# Patient Record
Sex: MALE | Race: WHITE | Employment: OTHER | URBAN - METROPOLITAN AREA
[De-identification: names, ages, dates, MRNs, and addresses within clinical notes are randomized per-mention and may not be internally consistent; named-entity substitution may affect disease eponyms.]

---

## 2017-01-30 LAB
AVERAGE GLUCOSE: NORMAL
HBA1C MFR BLD: 5.6 %

## 2017-03-07 ENCOUNTER — TELEPHONE (OUTPATIENT)
Dept: PULMONOLOGY | Age: 67
End: 2017-03-07

## 2017-04-04 ENCOUNTER — HOSPITAL ENCOUNTER (OUTPATIENT)
Dept: OTHER | Age: 67
Discharge: HOME | End: 2017-04-04
Attending: FAMILY MEDICINE | Admitting: FAMILY MEDICINE

## 2017-04-04 LAB
A/G RATIO: 1.7 (ref 1.1–2.2)
ALBUMIN SERPL-MCNC: 4.5 G/DL (ref 3.4–5)
ALP BLD-CCNC: 75 U/L (ref 40–129)
ALT SERPL-CCNC: 18 U/L (ref 10–40)
ANION GAP SERPL CALCULATED.3IONS-SCNC: 10 MMOL/L (ref 3–16)
AST SERPL-CCNC: 18 U/L (ref 15–37)
BILIRUB SERPL-MCNC: 0.6 MG/DL (ref 0–1)
BUN BLDV-MCNC: 19 MG/DL (ref 7–20)
CALCIUM SERPL-MCNC: 9.7 MG/DL (ref 8.3–10.6)
CHLORIDE BLD-SCNC: 98 MMOL/L (ref 99–110)
CO2: 32 MMOL/L (ref 21–32)
CREAT SERPL-MCNC: 1 MG/DL (ref 0.8–1.3)
GFR AFRICAN AMERICAN: >60
GFR NON-AFRICAN AMERICAN: >60
GLOBULIN: 2.6 G/DL
GLUCOSE BLD-MCNC: 86 MG/DL (ref 70–99)
HCT VFR BLD CALC: 38.6 % (ref 40.5–52.5)
HEMOGLOBIN: 12.7 G/DL (ref 13.5–17.5)
MCH RBC QN AUTO: 29.3 PG (ref 26–34)
MCHC RBC AUTO-ENTMCNC: 32.9 G/DL (ref 31–36)
MCV RBC AUTO: 89.1 FL (ref 80–100)
PDW BLD-RTO: 14.4 % (ref 12.4–15.4)
PLATELET # BLD: 219 K/UL (ref 135–450)
PMV BLD AUTO: 9.4 FL (ref 5–10.5)
POTASSIUM SERPL-SCNC: 4.4 MMOL/L (ref 3.5–5.1)
RBC # BLD: 4.34 M/UL (ref 4.2–5.9)
SEDIMENTATION RATE, ERYTHROCYTE: 43 MM/HR (ref 0–20)
SODIUM BLD-SCNC: 140 MMOL/L (ref 136–145)
TOTAL PROTEIN: 7.1 G/DL (ref 6.4–8.2)
URIC ACID, SERUM: 8.5 MG/DL (ref 3.5–7.2)
WBC # BLD: 8.4 K/UL (ref 4–11)

## 2017-05-20 ENCOUNTER — EMPLOYEE WELLNESS (OUTPATIENT)
Dept: OTHER | Age: 67
End: 2017-05-20

## 2017-05-20 LAB
CHOLESTEROL, TOTAL: 139 MG/DL (ref 0–199)
GLUCOSE BLD-MCNC: 80 MG/DL (ref 70–99)
HDLC SERPL-MCNC: 52 MG/DL (ref 40–60)
LDL CHOLESTEROL CALCULATED: 66 MG/DL
TRIGL SERPL-MCNC: 106 MG/DL (ref 0–150)

## 2017-07-05 ENCOUNTER — TELEPHONE (OUTPATIENT)
Dept: PULMONOLOGY | Age: 67
End: 2017-07-05

## 2017-07-05 ENCOUNTER — OFFICE VISIT (OUTPATIENT)
Dept: PULMONOLOGY | Age: 67
End: 2017-07-05

## 2017-07-05 VITALS
SYSTOLIC BLOOD PRESSURE: 100 MMHG | RESPIRATION RATE: 18 BRPM | HEART RATE: 65 BPM | WEIGHT: 204 LBS | OXYGEN SATURATION: 98 % | BODY MASS INDEX: 28.56 KG/M2 | TEMPERATURE: 97.5 F | HEIGHT: 71 IN | DIASTOLIC BLOOD PRESSURE: 65 MMHG

## 2017-07-05 DIAGNOSIS — G47.33 OSA TREATED WITH BIPAP: Primary | ICD-10-CM

## 2017-07-05 PROCEDURE — 99213 OFFICE O/P EST LOW 20 MIN: CPT | Performed by: NURSE PRACTITIONER

## 2017-07-05 RX ORDER — TAMSULOSIN HYDROCHLORIDE 0.4 MG/1
0.4 CAPSULE ORAL
Status: ON HOLD | COMMUNITY
Start: 2017-01-30 | End: 2021-01-01 | Stop reason: HOSPADM

## 2017-07-05 ASSESSMENT — SLEEP AND FATIGUE QUESTIONNAIRES
HOW LIKELY ARE YOU TO NOD OFF OR FALL ASLEEP WHILE SITTING QUIETLY AFTER LUNCH WITHOUT ALCOHOL: 0
ESS TOTAL SCORE: 0
HOW LIKELY ARE YOU TO NOD OFF OR FALL ASLEEP WHILE SITTING INACTIVE IN A PUBLIC PLACE: 0
NECK CIRCUMFERENCE (INCHES): 15
HOW LIKELY ARE YOU TO NOD OFF OR FALL ASLEEP WHILE SITTING AND READING: 0
HOW LIKELY ARE YOU TO NOD OFF OR FALL ASLEEP WHEN YOU ARE A PASSENGER IN A CAR FOR AN HOUR WITHOUT A BREAK: 0
HOW LIKELY ARE YOU TO NOD OFF OR FALL ASLEEP WHILE SITTING AND TALKING TO SOMEONE: 0
HOW LIKELY ARE YOU TO NOD OFF OR FALL ASLEEP WHILE LYING DOWN TO REST IN THE AFTERNOON WHEN CIRCUMSTANCES PERMIT: 0
HOW LIKELY ARE YOU TO NOD OFF OR FALL ASLEEP IN A CAR, WHILE STOPPED FOR A FEW MINUTES IN TRAFFIC: 0
HOW LIKELY ARE YOU TO NOD OFF OR FALL ASLEEP WHILE WATCHING TV: 0

## 2017-08-01 ENCOUNTER — HOSPITAL ENCOUNTER (OUTPATIENT)
Dept: OTHER | Age: 67
Discharge: HOME | End: 2017-08-31
Attending: FAMILY MEDICINE | Admitting: FAMILY MEDICINE

## 2017-08-28 LAB
INR BLD: 2.48 (ref 0.85–1.15)
PROTHROMBIN TIME: 28 SEC (ref 9.6–13)

## 2017-10-20 ENCOUNTER — HOSPITAL ENCOUNTER (OUTPATIENT)
Dept: OTHER | Age: 67
Discharge: HOME | End: 2017-10-31
Attending: FAMILY MEDICINE | Admitting: FAMILY MEDICINE

## 2017-10-20 LAB
INR BLD: 2.04 (ref 0.85–1.15)
PROTHROMBIN TIME: 23.1 SEC (ref 9.6–13)

## 2017-11-01 ENCOUNTER — HOSPITAL ENCOUNTER (OUTPATIENT)
Dept: OTHER | Age: 67
Discharge: HOME | End: 2017-11-30
Attending: FAMILY MEDICINE | Admitting: FAMILY MEDICINE

## 2017-12-20 ENCOUNTER — HOSPITAL ENCOUNTER (OUTPATIENT)
Dept: OTHER | Age: 67
Discharge: HOME | End: 2017-12-31
Attending: FAMILY MEDICINE | Admitting: FAMILY MEDICINE

## 2017-12-20 LAB
INR BLD: 1.97 (ref 0.85–1.15)
PROTHROMBIN TIME: 22.3 SEC (ref 9.6–13)

## 2018-01-01 ENCOUNTER — HOSPITAL ENCOUNTER (OUTPATIENT)
Dept: OTHER | Age: 68
Discharge: HOME | End: 2018-01-31
Attending: FAMILY MEDICINE | Admitting: FAMILY MEDICINE

## 2018-02-01 ENCOUNTER — HOSPITAL ENCOUNTER (OUTPATIENT)
Dept: OTHER | Age: 68
Discharge: HOME | End: 2018-02-28
Attending: FAMILY MEDICINE | Admitting: FAMILY MEDICINE

## 2018-02-12 LAB
INR BLD: 1.91 (ref 0.85–1.15)
PROTHROMBIN TIME: 21.6 SEC (ref 9.6–13)

## 2018-03-01 ENCOUNTER — HOSPITAL ENCOUNTER (OUTPATIENT)
Dept: OTHER | Age: 68
Discharge: HOME | End: 2018-03-01
Attending: FAMILY MEDICINE | Admitting: FAMILY MEDICINE

## 2018-03-01 ENCOUNTER — HOSPITAL ENCOUNTER (OUTPATIENT)
Dept: OTHER | Age: 68
Discharge: HOME | End: 2018-03-31
Attending: FAMILY MEDICINE | Admitting: FAMILY MEDICINE

## 2018-03-01 LAB
A/G RATIO: 2 (ref 1.1–2.2)
ALBUMIN SERPL-MCNC: 5.1 G/DL (ref 3.4–5)
ALP BLD-CCNC: 70 U/L (ref 40–129)
ALT SERPL-CCNC: 31 U/L (ref 10–40)
ANION GAP SERPL CALCULATED.3IONS-SCNC: 15 MMOL/L (ref 3–16)
AST SERPL-CCNC: 26 U/L (ref 15–37)
BILIRUB SERPL-MCNC: 0.6 MG/DL (ref 0–1)
BUN BLDV-MCNC: 20 MG/DL (ref 7–20)
CALCIUM SERPL-MCNC: 10 MG/DL (ref 8.3–10.6)
CHLORIDE BLD-SCNC: 98 MMOL/L (ref 99–110)
CHOLESTEROL, TOTAL: 162 MG/DL (ref 0–199)
CO2: 29 MMOL/L (ref 21–32)
CREAT SERPL-MCNC: 1.1 MG/DL (ref 0.8–1.3)
GFR AFRICAN AMERICAN: >60
GFR NON-AFRICAN AMERICAN: >60
GLOBULIN: 2.6 G/DL
GLUCOSE BLD-MCNC: 95 MG/DL (ref 70–99)
HCT VFR BLD CALC: 40.7 % (ref 40.5–52.5)
HDLC SERPL-MCNC: 77 MG/DL (ref 40–60)
HEMOGLOBIN: 13.7 G/DL (ref 13.5–17.5)
INR BLD: 1.75 (ref 0.85–1.15)
LDL CHOLESTEROL CALCULATED: 65 MG/DL
MCH RBC QN AUTO: 30.6 PG (ref 26–34)
MCHC RBC AUTO-ENTMCNC: 33.7 G/DL (ref 31–36)
MCV RBC AUTO: 90.9 FL (ref 80–100)
PDW BLD-RTO: 14.2 % (ref 12.4–15.4)
PLATELET # BLD: 234 K/UL (ref 135–450)
PMV BLD AUTO: 10.5 FL (ref 5–10.5)
POTASSIUM SERPL-SCNC: 4.7 MMOL/L (ref 3.5–5.1)
PROTHROMBIN TIME: 19.8 SEC (ref 9.6–13)
RBC # BLD: 4.48 M/UL (ref 4.2–5.9)
SODIUM BLD-SCNC: 142 MMOL/L (ref 136–145)
TOTAL PROTEIN: 7.7 G/DL (ref 6.4–8.2)
TRIGL SERPL-MCNC: 102 MG/DL (ref 0–150)
VLDLC SERPL CALC-MCNC: 20 MG/DL
WBC # BLD: 6.3 K/UL (ref 4–11)

## 2018-03-01 NOTE — PRE-PROCEDURE INSTRUCTIONS
1.  Do not eat or drink anything after 12 midnight prior to surgery. This includes no water, chewing gum or mints. 2.  Take the following pills with a small sip of water on the morning of surgery . 3. Aspirin, Ibuprofen, Advil, Naproxen, Vitamin E and other Anti-inflammatory products should be stopped for 5 days before surgery or as directed by your physician. 4.  Check with your doctor regarding stopping Plavix, Coumadin, Lovenox, Fragmin or other blood thinners. 5.  Do not smoke and do not drink alcoholic beverages 24 hours prior to surgery. This includes NA Beer. 6.  You may brush your teeth and gargle the morning of surgery. DO NOT SWALLOW WATER.  7.  You MUST make arrangements for a responsible adult to take you home after your surgery. You will not be allowed to leave alone or drive yourself home. It is strongly suggested someone stay with you the first 24 hours. Your surgery will be cancelled if you do not have a ride home. 8.  A parent/legal guardian must accompany a child scheduled for surgery and plan to stay at the hospital until the child is discharged. Please do not bring other children with you. 9.  Please wear simple, loose fitting clothing to the hospital.  [place] not bring valuables ( money, credit cards, checkbooks, etc.)  Do not wear any makeup (including no eye makeup) or nail polish on your fingers or toes. 10.  Do not wear any jewelry or piercing on the day of surgery. All body piercing jewelry must be removed. 11.  If you have dentures, they will be removed before going to the OR; we will provide you a container. If you wear contact lenses or glasses, they will be removed; please bring a case for them. 12.  Please see your family doctor/pediatrician for a history & physical and/or concerning medications. Bring any test results/reports from your physician's office the day of surgery. 15.  Remember to bring Blood Bank Bracelet to the hospital on the day of surgery.   14.  If you have a Living Will and Durable Power of  for Healthcare, please bring in a copy. 13.  Notify your Surgeon if you develop any illness between now and surgery time; cough, cold, fever, sore throat, nausea, vomiting, etc.  Please notify your surgeon if you experience dizziness, shortness of breath or blurred vision between now and the time of your surgery. 16.  DO NOT shave your operative site 96 hours (4 days) prior to surgery. For face and neck surgery, men may use an electric razor 48 hours (2 days) prior to surgery. 17. Shower the night before surgery and the morning of surgery with  an antibacterial soap   or  Chlorhexidine gluconate (for total joint replacement). To provide excellent care, visitors will be limited to two in a room at any given time. Please no children under the age of 15 in the surgical department.

## 2018-03-03 NOTE — H&P
The H&P was reviewed, the patient was examined, and no change has occurred in the patient's condition since the H&P was completed.     Edita Farias MD, PhD, FACS

## 2018-03-06 ENCOUNTER — HOSPITAL ENCOUNTER (OUTPATIENT)
Dept: SURGERY | Age: 68
Discharge: HOME | End: 2018-03-06
Attending: OPHTHALMOLOGY | Admitting: OPHTHALMOLOGY

## 2018-03-06 VITALS
SYSTOLIC BLOOD PRESSURE: 93 MMHG | DIASTOLIC BLOOD PRESSURE: 56 MMHG | TEMPERATURE: 98.8 F | WEIGHT: 177 LBS | HEIGHT: 69 IN | HEART RATE: 75 BPM | RESPIRATION RATE: 16 BRPM | BODY MASS INDEX: 26.22 KG/M2 | OXYGEN SATURATION: 100 %

## 2018-03-06 LAB
GLUCOSE BLD-MCNC: 69 MG/DL (ref 70–99)
GLUCOSE BLD-MCNC: 84 MG/DL (ref 70–99)
PERFORMED ON: ABNORMAL
PERFORMED ON: NORMAL

## 2018-03-06 RX ORDER — SODIUM CHLORIDE, SODIUM LACTATE, POTASSIUM CHLORIDE, CALCIUM CHLORIDE 600; 310; 30; 20 MG/100ML; MG/100ML; MG/100ML; MG/100ML
INJECTION, SOLUTION INTRAVENOUS
Status: DISPENSED
Start: 2018-03-06 | End: 2018-03-06

## 2018-03-06 RX ORDER — PREDNISOLONE ACETATE 10 MG/ML
1 SUSPENSION/ DROPS OPHTHALMIC SEE ADMIN INSTRUCTIONS
Status: DISCONTINUED | OUTPATIENT
Start: 2018-03-06 | End: 2018-03-07 | Stop reason: HOSPADM

## 2018-03-06 RX ORDER — SODIUM CHLORIDE, SODIUM LACTATE, POTASSIUM CHLORIDE, CALCIUM CHLORIDE 600; 310; 30; 20 MG/100ML; MG/100ML; MG/100ML; MG/100ML
INJECTION, SOLUTION INTRAVENOUS CONTINUOUS
Status: DISCONTINUED | OUTPATIENT
Start: 2018-03-06 | End: 2018-03-07 | Stop reason: HOSPADM

## 2018-03-06 RX ORDER — MOXIFLOXACIN 5 MG/ML
1 SOLUTION/ DROPS OPHTHALMIC SEE ADMIN INSTRUCTIONS
Status: DISCONTINUED | OUTPATIENT
Start: 2018-03-06 | End: 2018-03-07 | Stop reason: HOSPADM

## 2018-03-06 RX ADMIN — SODIUM CHLORIDE, SODIUM LACTATE, POTASSIUM CHLORIDE, CALCIUM CHLORIDE: 600; 310; 30; 20 INJECTION, SOLUTION INTRAVENOUS at 09:00

## 2018-03-06 ASSESSMENT — PAIN SCALES - GENERAL
PAINLEVEL_OUTOF10: 0
PAINLEVEL_OUTOF10: 0

## 2018-03-06 ASSESSMENT — ENCOUNTER SYMPTOMS: SHORTNESS OF BREATH: 1

## 2018-03-06 ASSESSMENT — PAIN - FUNCTIONAL ASSESSMENT: PAIN_FUNCTIONAL_ASSESSMENT: 0-10

## 2018-03-06 NOTE — OP NOTE
Enderana maria Ramesh    OPERATIVE NOTE    Preoperative Diagnosis: Cataract left eye    Postoperative Diagnosis: Cataract left eye    Procedure: Complex phacoemulsification with intraocular lens inplantation, left eye    Surgeon: Mera Graff M.D., Ph.D. Anesthesia: MAC with topical    Complications: none    Specimens: none    Indications for procedure: The patient is a 78y.o. year old with decreased vision, glare and halos around lights, and trouble with activities of daily living. Examination revealed a visually significant cataract in the left eye. The patient also showed evidence of poor dilation. Risks, benefits, and alternatives to surgery were discussed with the patient and the patient elected to proceed with phacoemulsification with lens implantation. Details of the procedure: Following informed consent, the patient was taken to the operating room and placed in the supine position. The eye was prepped and draped in the usual sterile fashion using aseptic technique for cataract surgery. Following topical tetracaine drops a side port incision was made in the inferotemporal cornea. The eye was filled with Trypan blue followed by balanced salt solution. The eye was filled with 1% non-preserved lidocaine. The eye was filled with viscoelastic and a 2.2 mm keratome blade was used to make a 3-plane clear corneal incision in the superotemporal cornea. A 7.0 mm Malyugin ring was inserted in the anterior chamber to aid with pupil dilation. The cystitome was used to make a tear in the anterior capsule and a Utrata forceps was used to make a complete curvilinear capsulorrhexis. The lens was hydrodissected and freely rotated. Phacoemulsification was performed. Irrigation/aspiration was used to remove all cortical material from the capsular bag. The eye was filled with viscoelastic and a foldable posterior chamber intraocular lens was injected into the capsular bag. The Malyugin ring was removed.

## 2018-03-06 NOTE — ANESTHESIA PRE-OP
Department of Anesthesiology  Preprocedure Note       Name:  Mary Segal   Age:  78 y.o.  :  1950                                          MRN:  [id number]         Date:  3/6/2018      Surgeon:    Procedure:    Medications prior to admission:   Prior to Admission medications    Medication Sig Start Date End Date Taking? Authorizing Provider   tamsulosin (FLOMAX) 0.4 MG capsule Take 0.4 mg by mouth 17  Yes Historical Provider, MD   furosemide (LASIX) 40 MG tablet Take 1 tablet by mouth 2 times daily 12/13/15  Yes Louis Spear MD   atorvastatin (LIPITOR) 20 MG tablet Take 20 mg by mouth nightly   Yes Historical Provider, MD   verapamil (CALAN-SR) 180 MG CR tablet Take 180 mg by mouth 2 times daily. Yes Historical Provider, MD   benazepril (LOTENSIN) 40 MG tablet Take 40 mg by mouth daily. Yes Historical Provider, MD   warfarin (COUMADIN) 10 MG tablet Take 10 mg by mouth daily. Yes Historical Provider, MD   docusate sodium (COLACE) 100 MG capsule Take 100 mg by mouth every other day    Yes Historical Provider, MD       Current medications:    Current Outpatient Prescriptions   Medication Sig Dispense Refill    tamsulosin (FLOMAX) 0.4 MG capsule Take 0.4 mg by mouth      furosemide (LASIX) 40 MG tablet Take 1 tablet by mouth 2 times daily 60 tablet 3    atorvastatin (LIPITOR) 20 MG tablet Take 20 mg by mouth nightly      verapamil (CALAN-SR) 180 MG CR tablet Take 180 mg by mouth 2 times daily.  benazepril (LOTENSIN) 40 MG tablet Take 40 mg by mouth daily.  warfarin (COUMADIN) 10 MG tablet Take 10 mg by mouth daily.       docusate sodium (COLACE) 100 MG capsule Take 100 mg by mouth every other day        Current Facility-Administered Medications   Medication Dose Route Frequency Provider Last Rate Last Dose    moxifloxacin (VIGAMOX) 0.5 % ophthalmic solution 1 drop  1 drop Left Eye See Admin Instructions Stefano Mera MD        prednisoLONE acetate (PRED FORTE) 1 % ophthalmic suspension 1 drop  1 drop Left Eye See Admin Instructions Mera Bowles MD        lactated ringers infusion   Intravenous Continuous Kaylen Paulson  mL/hr at 03/06/18 0900      lactated ringers infusion             epinephrine and lidocaine 2% PF in BSS injection (1 mL)   Intraocular Once Mera Bowles MD           Allergies:  No Known Allergies    Problem List:    Patient Active Problem List   Diagnosis Code    Atrial fibrillation ([facility] I48.91    Diabetes mellitus ([facility] E11.9    Hypertension I10    Hyperlipidemia E78.5    Urinary retention R33.9    Sleep apnea G47.30    Morbid obesity with BMI of 40.0-44.9, adult ([facility] E66.01, Z68.41    Prolonged pt (prothrombin time) R79.1    Snoring R06.83    Dyspnea R06.00    Chronic atrial fibrillation (HCC) I48.2    Essential hypertension I10    BELKYS treated with BiPAP G47.33    Nocturnal hypoxemia G47.34       Past Medical History:        Diagnosis Date    Atrial fibrillation ([facility]     CHF (congestive heart failure) ([facility]     Diabetes mellitus ([facility]     Hyperlipidemia     Hypertension     BELKYS treated with BiPAP        Past Surgical History:        Procedure Laterality Date    EYE SURGERY  12/1/15    right eye cataract    HERNIA REPAIR         Social History:    Social History   Substance Use Topics    Smoking status: Former Smoker     Quit date: 3/1/2005    Smokeless tobacco: Never Used    Alcohol use No                                Counseling given: Not Answered      Vital Signs (Current):   Vitals:    03/01/18 1053 03/06/18 0827   BP:  (!) 101/56   Pulse:  72   Resp:  20   Temp:  99 °F (37.2 °C)   TempSrc:  Temporal   SpO2:  100%   Weight: 177 lb (80.3 kg)    Height: 5' 9\" (1.753 m)                                               BP Readings from Last 3 Encounters:   03/06/18 (!) 101/56   07/05/17 100/65   03/09/16 99/66       NPO Status: Time of last liquid consumption: 0000                        Time of last solid

## 2018-03-20 VITALS — WEIGHT: 207 LBS

## 2018-04-01 ENCOUNTER — HOSPITAL ENCOUNTER (OUTPATIENT)
Dept: OTHER | Age: 68
Discharge: HOME | End: 2018-04-30
Attending: FAMILY MEDICINE | Admitting: FAMILY MEDICINE

## 2018-04-16 LAB
INR BLD: 1.86 (ref 0.85–1.15)
PROTHROMBIN TIME: 21 SEC (ref 9.6–13)

## 2018-05-01 ENCOUNTER — HOSPITAL ENCOUNTER (OUTPATIENT)
Dept: OTHER | Age: 68
Discharge: HOME | End: 2018-05-31
Attending: FAMILY MEDICINE | Admitting: FAMILY MEDICINE

## 2018-05-16 LAB
INR BLD: 1.87 (ref 0.85–1.15)
PROTHROMBIN TIME: 21.1 SEC (ref 9.6–13)

## 2018-05-19 ENCOUNTER — EMPLOYEE WELLNESS (OUTPATIENT)
Dept: OTHER | Age: 68
End: 2018-05-19

## 2018-05-29 VITALS — WEIGHT: 174 LBS

## 2018-06-01 ENCOUNTER — HOSPITAL ENCOUNTER (OUTPATIENT)
Dept: OTHER | Age: 68
Discharge: HOME | End: 2018-06-30
Attending: FAMILY MEDICINE | Admitting: FAMILY MEDICINE

## 2018-06-12 LAB
INR BLD: 4.03 (ref 0.86–1.14)
PROTHROMBIN TIME: 45.9 SEC (ref 9.8–13)

## 2018-07-01 ENCOUNTER — HOSPITAL ENCOUNTER (OUTPATIENT)
Dept: OTHER | Age: 68
Discharge: HOME | End: 2018-07-01
Attending: FAMILY MEDICINE | Admitting: FAMILY MEDICINE

## 2018-07-02 LAB
INR BLD: 3.18 (ref 0.86–1.14)
PROTHROMBIN TIME: 36.3 SEC (ref 9.8–13)

## 2018-07-11 ENCOUNTER — TELEPHONE (OUTPATIENT)
Dept: PULMONOLOGY | Age: 68
End: 2018-07-11

## 2018-07-11 ENCOUNTER — OFFICE VISIT (OUTPATIENT)
Dept: PULMONOLOGY | Age: 68
End: 2018-07-11

## 2018-07-11 VITALS
TEMPERATURE: 97.1 F | SYSTOLIC BLOOD PRESSURE: 101 MMHG | RESPIRATION RATE: 16 BRPM | OXYGEN SATURATION: 98 % | BODY MASS INDEX: 25.33 KG/M2 | HEIGHT: 69 IN | DIASTOLIC BLOOD PRESSURE: 54 MMHG | WEIGHT: 171 LBS | HEART RATE: 50 BPM

## 2018-07-11 DIAGNOSIS — Z71.89 ENCOUNTER FOR BIPAP USE COUNSELING: ICD-10-CM

## 2018-07-11 DIAGNOSIS — G47.33 OSA TREATED WITH BIPAP: Primary | ICD-10-CM

## 2018-07-11 PROCEDURE — 99213 OFFICE O/P EST LOW 20 MIN: CPT | Performed by: NURSE PRACTITIONER

## 2018-07-11 ASSESSMENT — SLEEP AND FATIGUE QUESTIONNAIRES
HOW LIKELY ARE YOU TO NOD OFF OR FALL ASLEEP WHILE LYING DOWN TO REST IN THE AFTERNOON WHEN CIRCUMSTANCES PERMIT: 0
HOW LIKELY ARE YOU TO NOD OFF OR FALL ASLEEP WHILE SITTING AND READING: 0
HOW LIKELY ARE YOU TO NOD OFF OR FALL ASLEEP IN A CAR, WHILE STOPPED FOR A FEW MINUTES IN TRAFFIC: 0
NECK CIRCUMFERENCE (INCHES): 15
HOW LIKELY ARE YOU TO NOD OFF OR FALL ASLEEP WHILE WATCHING TV: 0
HOW LIKELY ARE YOU TO NOD OFF OR FALL ASLEEP WHILE SITTING AND TALKING TO SOMEONE: 0
HOW LIKELY ARE YOU TO NOD OFF OR FALL ASLEEP WHEN YOU ARE A PASSENGER IN A CAR FOR AN HOUR WITHOUT A BREAK: 0
ESS TOTAL SCORE: 0
HOW LIKELY ARE YOU TO NOD OFF OR FALL ASLEEP WHILE SITTING QUIETLY AFTER LUNCH WITHOUT ALCOHOL: 0
HOW LIKELY ARE YOU TO NOD OFF OR FALL ASLEEP WHILE SITTING INACTIVE IN A PUBLIC PLACE: 0

## 2018-07-11 NOTE — PATIENT INSTRUCTIONS
school, daily life, etc., behind when you go to bed. Some people find it useful to assign a \"worry period\" during the evening or afternoon for these issues. CPAP Equipment Cleaning and Disinfecting Schedule  Equipment Cleaning Frequency Instructions  Disinfecting Frequency   Non-Disposable Filters  Weekly Mild soapy water, Rinse, Air Dry Not Required   Disposable Filters Change as needed  2-4 weeks Do Not Wash Not Required   Hose/tubing Daily Mild soapy water, Rinse, Air Dry Once a week   Mask / Nasal Pillows Daily Mild soapy water, Rinse, Air Dry Once a week   Headgear Weekly Hand wash, Mild soapy water, Rinse, Dry  Not Required   Humidifier Daily Empty water daily  Mild soapy water, Rinse well, Air Dry  Once a week   CPAP Unit As Needed Dust with damp cloth,  No detergents or sprays Not Required         Disinfect (per schedule) with 1 part white vinegar and 3 parts water- soak mask and water chamber for 30 minutes every 1-2 weeks, more often if sick. Allow water/vinegar mixture to run through tubing. Allow all equipment to air dry. Drying Hints:   Always hang tubing away from direct sunlight, as this will cause the tubing to become yellow, brittle and crack over a period of time. DO NOT attach the wet tubing to your CPAP unit to blow-dry it. The moisture from the tubing can drain back into your machine. Moisture in your unit can cause sudden pressure increases or short circuits  DO's and DON'Ts:  - Don't use alcohol-based products to clean your mask, because it can cause the materials to become hard and brittle. - Don't put headgear in the washer or dryer  - Don't use any caustic or household cleaning solutions such as bleach on your CPAP   equipment.  - Do follow the recommended cleaning schedule. - Do change your disposable filter frequently. Adapted From: [email or web address]/cleaning. shtm.   These are general suggestions for all models please follow specific s recommendations and specific instructions

## 2018-07-11 NOTE — PROGRESS NOTES
.Up to date with Influenza vaccine. Orders verbalized by Vickie Mendoza CNP;  Split night titration July 31 2018  F/U to discuss  Request compliance report from [facility].
(CALAN-SR) 180 MG CR tablet, Take 180 mg by mouth 2 times daily. , Disp: , Rfl:     benazepril (LOTENSIN) 40 MG tablet, Take 40 mg by mouth daily. , Disp: , Rfl:     warfarin (COUMADIN) 10 MG tablet, Take 10 mg by mouth daily. , Disp: , Rfl:     docusate sodium (COLACE) 100 MG capsule, Take 100 mg by mouth every other day , Disp: , Rfl:     Objective:   PHYSICAL EXAM:        VITALS:  BP (!) 101/54 (Site: Left Arm, Position: Sitting)   Pulse 50   Temp 97.1 °F (36.2 °C) (Oral)   Resp 16   Ht 5' 9\" (1.753 m)   Wt 171 lb (77.6 kg)   BMI 25.25 kg/m²     Gen: No acute distress. BMI of 28.45  Eyes: PERRL. No sclera icterus. No conjunctival injection. ENT: No discharge. Pharynx clear. Mallampati class IV. Neck: Trachea midline. No obvious mass. Neck circumference 15\"  Resp: No accessory muscle use. No crackles. No wheezes. No rhonchi. CV: Regular rate. IRRegular rhythm. No murmur or rub. Skin: Warm and dry. M/S: No cyanosis. No obvious joint deformity. Neuro: Awake. Alert. Moves all four extremities. Psych: Oriented x 3. No anxiety. Data:  Split night PSG completed on 12/15/16 reviewed by me and showed AHI 95.5 and desaturation to 56%, with 227 minutes below 88%. He has improved BELKYS on bipap therapy and required 3L bleed in. BIPAP compliance data:  Compliance download report from 6/5/17 to 7/4/17  showed patient is using machine 8:26 hrs/night with 100 % compliance and AHI 2.1 within this time frame. 30/30days with greater than 4 hours of machine use. 90% pressure 17.4/13.4 cm H20    ASSESSMENT:  · Severe BELKYS. AHI 95, BIPAP 17/13 cm H2O with 3 L O2 bleed in. States compliant  · Chronic diastolic CHF - sees Dr. Rojelio Prieto  · Atrial fibrillation on Coumadin    PLAN:  · Continue BIPAP 17/13 cm H2O. Will order new diagnostic sleep study to re-evaluate for BELKYS as patient has lost 200+ lbs, no snoring if not using BIPAP.   (order split night in case patient does still have BELKYS can re-titrate)  · Request

## 2018-07-11 NOTE — TELEPHONE ENCOUNTER
Request compliance report from [address] [place] [address]. (spoke with [place] and [place] state system is still down, requested a CPAP compliance report; when system is available; to be faxed to the office).   OV on 07/11/18

## 2018-07-30 ENCOUNTER — TELEPHONE (OUTPATIENT)
Dept: PULMONOLOGY | Age: 68
End: 2018-07-30

## 2018-07-30 DIAGNOSIS — G47.33 OSA (OBSTRUCTIVE SLEEP APNEA): Primary | ICD-10-CM

## 2018-07-30 NOTE — TELEPHONE ENCOUNTER
Would prefer patient be in controlled environment that way if O2 is dropping too low it can be added/BIPAP restarted. If study was denied I can try to do peer to peer to see if I can get it approved?

## 2018-07-30 NOTE — TELEPHONE ENCOUNTER
Pt called office stating that in lab split night is not covered by insurance and/or is to expensive and would like to have an HST. Pt has O2 at night if pt can not use o2 during HST it can be done if not then HST is not an option per sleep center    7/11/18    ASSESSMENT:  · Severe BELKYS. AHI 95, BIPAP 17/13 cm H2O with 3 L O2 bleed in. States compliant  · Chronic diastolic CHF - sees Dr. David Soares  · Atrial fibrillation on Coumadin     PLAN:  · Continue BIPAP 17/13 cm H2O. Will order new diagnostic sleep study to re-evaluate for BELKYS as patient has lost 200+ lbs, no snoring if not using BIPAP. (order split night in case patient does still have BELKYS can re-titrate)  · Request compliance report from DME. · Please bring BIPAP and all equipment to each office visit  · Advised to use BiPAP 6-8 hrs at night and during naps. · Replacement of mask, tubing, head straps every 3-6 months or sooner if damaged. · Patient instructed to contact [employer] company for any mask, tubing or machine trouble shooting if problems arise. · today  · Sleep hygiene  · No driving motorized vehicles or operating heavy machinery while fatigue, drowsy or sleepy. · Weight loss is also recommended as a long-term intervention. Dieting and exercise- congratulated on significant weight loss. · Complications of BELKYS if not treated were discussed with patient to include systemic hypertension, pulmonary hypertension, cardiovascular morbidities, car accidents and all cause mortality. · Follow up after testing.

## 2018-08-01 ENCOUNTER — HOSPITAL ENCOUNTER (OUTPATIENT)
Age: 68
Setting detail: SPECIMEN
Discharge: HOME | End: 2018-08-01
Payer: COMMERCIAL

## 2018-08-01 LAB
INR BLD: 2.58 (ref 0.86–1.14)
PROTHROMBIN TIME: 29.4 SEC (ref 9.8–13)

## 2018-08-01 PROCEDURE — 85610 PROTHROMBIN TIME: CPT

## 2018-08-01 PROCEDURE — 36415 COLL VENOUS BLD VENIPUNCTURE: CPT

## 2018-08-01 NOTE — TELEPHONE ENCOUNTER
Patient calling back requesting to speak with [facility]. Patient states she can call him back today before 1 he will be available, or anytime after 2:30.   He's requesting to be called back on [phone]

## 2018-08-06 NOTE — TELEPHONE ENCOUNTER
Discussed with Dr. Lizz Zamora. Okay for HST (instead of inlab) to evaluate if patient continues with BELKYS despite 200 lb weight loss and no snoring off BIPAP. I called patient and made aware. Please call to schedule HST and reschedule f/u to discuss results after testing.

## 2018-08-22 ENCOUNTER — HOSPITAL ENCOUNTER (OUTPATIENT)
Dept: SLEEP CENTER | Age: 68
Discharge: HOME | End: 2018-08-24
Payer: COMMERCIAL

## 2018-08-22 DIAGNOSIS — G47.33 OSA (OBSTRUCTIVE SLEEP APNEA): ICD-10-CM

## 2018-08-22 PROCEDURE — 95806 SLEEP STUDY UNATT&RESP EFFT: CPT

## 2018-09-19 ENCOUNTER — HOSPITAL ENCOUNTER (OUTPATIENT)
Age: 68
Discharge: HOME | End: 2018-09-19
Payer: COMMERCIAL

## 2018-09-19 LAB
INR BLD: 3.67 (ref 0.86–1.14)
PROTHROMBIN TIME: 41.8 SEC (ref 9.8–13)

## 2018-09-19 PROCEDURE — 85610 PROTHROMBIN TIME: CPT

## 2018-09-19 PROCEDURE — 36415 COLL VENOUS BLD VENIPUNCTURE: CPT

## 2018-10-05 ENCOUNTER — HOSPITAL ENCOUNTER (OUTPATIENT)
Age: 68
Setting detail: SPECIMEN
Discharge: HOME | End: 2018-10-05
Payer: COMMERCIAL

## 2018-10-05 LAB
INR BLD: 2.37 (ref 0.86–1.14)
PROTHROMBIN TIME: 27 SEC (ref 9.8–13)

## 2018-10-05 PROCEDURE — 36415 COLL VENOUS BLD VENIPUNCTURE: CPT

## 2018-10-05 PROCEDURE — 85610 PROTHROMBIN TIME: CPT

## 2018-10-25 ENCOUNTER — HOSPITAL ENCOUNTER (OUTPATIENT)
Age: 68
Discharge: HOME | End: 2018-10-25
Payer: COMMERCIAL

## 2018-10-25 PROCEDURE — 80053 COMPREHEN METABOLIC PANEL: CPT

## 2018-10-25 PROCEDURE — 85025 COMPLETE CBC W/AUTO DIFF WBC: CPT

## 2018-10-25 PROCEDURE — 84443 ASSAY THYROID STIM HORMONE: CPT

## 2018-10-25 PROCEDURE — 82085 ASSAY OF ALDOLASE: CPT

## 2018-10-25 PROCEDURE — 85652 RBC SED RATE AUTOMATED: CPT

## 2018-10-25 PROCEDURE — 82550 ASSAY OF CK (CPK): CPT

## 2018-10-25 PROCEDURE — 36415 COLL VENOUS BLD VENIPUNCTURE: CPT

## 2018-10-25 PROCEDURE — 86140 C-REACTIVE PROTEIN: CPT

## 2018-10-26 LAB
A/G RATIO: 2 (ref 1.1–2.2)
ALBUMIN SERPL-MCNC: 4.9 G/DL (ref 3.4–5)
ALP BLD-CCNC: 81 U/L (ref 40–129)
ALT SERPL-CCNC: 48 U/L (ref 10–40)
ANION GAP SERPL CALCULATED.3IONS-SCNC: 11 MMOL/L (ref 3–16)
AST SERPL-CCNC: 42 U/L (ref 15–37)
BASOPHILS ABSOLUTE: 0.1 K/UL (ref 0–0.2)
BASOPHILS RELATIVE PERCENT: 1 %
BILIRUB SERPL-MCNC: 0.5 MG/DL (ref 0–1)
BUN BLDV-MCNC: 19 MG/DL (ref 7–20)
C-REACTIVE PROTEIN: 3.9 MG/L (ref 0–5.1)
CALCIUM SERPL-MCNC: 9.9 MG/DL (ref 8.3–10.6)
CHLORIDE BLD-SCNC: 98 MMOL/L (ref 99–110)
CO2: 29 MMOL/L (ref 21–32)
CREAT SERPL-MCNC: 0.9 MG/DL (ref 0.8–1.3)
EOSINOPHILS ABSOLUTE: 0.3 K/UL (ref 0–0.6)
EOSINOPHILS RELATIVE PERCENT: 5 %
GFR AFRICAN AMERICAN: >60
GFR NON-AFRICAN AMERICAN: >60
GLOBULIN: 2.5 G/DL
GLUCOSE BLD-MCNC: 91 MG/DL (ref 70–99)
HCT VFR BLD CALC: 38.8 % (ref 40.5–52.5)
HEMOGLOBIN: 13 G/DL (ref 13.5–17.5)
LYMPHOCYTES ABSOLUTE: 1.7 K/UL (ref 1–5.1)
LYMPHOCYTES RELATIVE PERCENT: 30.1 %
MCH RBC QN AUTO: 30.2 PG (ref 26–34)
MCHC RBC AUTO-ENTMCNC: 33.4 G/DL (ref 31–36)
MCV RBC AUTO: 90.3 FL (ref 80–100)
MONOCYTES ABSOLUTE: 0.5 K/UL (ref 0–1.3)
MONOCYTES RELATIVE PERCENT: 8.3 %
NEUTROPHILS ABSOLUTE: 3.1 K/UL (ref 1.7–7.7)
NEUTROPHILS RELATIVE PERCENT: 55.6 %
PDW BLD-RTO: 13.7 % (ref 12.4–15.4)
PLATELET # BLD: 189 K/UL (ref 135–450)
PMV BLD AUTO: 10 FL (ref 5–10.5)
POTASSIUM SERPL-SCNC: 4.3 MMOL/L (ref 3.5–5.1)
RBC # BLD: 4.3 M/UL (ref 4.2–5.9)
SEDIMENTATION RATE, ERYTHROCYTE: 15 MM/HR (ref 0–20)
SODIUM BLD-SCNC: 138 MMOL/L (ref 136–145)
TOTAL CK: 90 U/L (ref 39–308)
TOTAL PROTEIN: 7.4 G/DL (ref 6.4–8.2)
TSH SERPL DL<=0.05 MIU/L-ACNC: 1.64 UIU/ML (ref 0.27–4.2)
WBC # BLD: 5.6 K/UL (ref 4–11)

## 2018-10-27 LAB — ALDOLASE: 5.8 U/L (ref 1.5–8.1)

## 2018-11-08 ENCOUNTER — HOSPITAL ENCOUNTER (OUTPATIENT)
Dept: CT IMAGING | Age: 68
End: 2018-11-08
Payer: COMMERCIAL

## 2018-11-08 ENCOUNTER — HOSPITAL ENCOUNTER (OUTPATIENT)
Dept: CT IMAGING | Age: 68
Discharge: HOME | End: 2018-11-08
Payer: COMMERCIAL

## 2018-11-08 DIAGNOSIS — R53.1 WEAKNESS: ICD-10-CM

## 2018-11-08 DIAGNOSIS — R63.4 WEIGHT LOSS: ICD-10-CM

## 2018-11-08 PROCEDURE — 6360000004 HC RX CONTRAST MEDICATION: Performed by: FAMILY MEDICINE

## 2018-11-08 PROCEDURE — 71260 CT THORAX DX C+: CPT

## 2018-11-08 RX ADMIN — IOPAMIDOL 75 ML: 755 INJECTION, SOLUTION INTRAVENOUS at 15:15

## 2018-11-19 ENCOUNTER — HOSPITAL ENCOUNTER (OUTPATIENT)
Dept: CT IMAGING | Age: 68
Discharge: HOME | End: 2018-11-19
Payer: COMMERCIAL

## 2018-11-19 DIAGNOSIS — R63.4 LOSS OF WEIGHT: ICD-10-CM

## 2018-11-19 DIAGNOSIS — R53.1 WEAKNESS: ICD-10-CM

## 2018-11-19 DIAGNOSIS — R63.4 WEIGHT LOSS: ICD-10-CM

## 2018-11-19 PROCEDURE — 6360000004 HC RX CONTRAST MEDICATION: Performed by: FAMILY MEDICINE

## 2018-11-19 PROCEDURE — 74177 CT ABD & PELVIS W/CONTRAST: CPT

## 2018-11-19 RX ADMIN — IOPAMIDOL 75 ML: 755 INJECTION, SOLUTION INTRAVENOUS at 13:41

## 2018-12-17 ENCOUNTER — HOSPITAL ENCOUNTER (OUTPATIENT)
Age: 68
Discharge: HOME | End: 2018-12-17
Payer: COMMERCIAL

## 2018-12-17 LAB
A/G RATIO: 1.8 (ref 1.1–2.2)
ALBUMIN SERPL-MCNC: 4.5 G/DL (ref 3.4–5)
ALP BLD-CCNC: 80 U/L (ref 40–129)
ALT SERPL-CCNC: 53 U/L (ref 10–40)
ANION GAP SERPL CALCULATED.3IONS-SCNC: 12 MMOL/L (ref 3–16)
AST SERPL-CCNC: 41 U/L (ref 15–37)
BILIRUB SERPL-MCNC: 0.5 MG/DL (ref 0–1)
BUN BLDV-MCNC: 12 MG/DL (ref 7–20)
CALCIUM SERPL-MCNC: 9.6 MG/DL (ref 8.3–10.6)
CHLORIDE BLD-SCNC: 101 MMOL/L (ref 99–110)
CHOLESTEROL, FASTING: 149 MG/DL (ref 0–199)
CO2: 31 MMOL/L (ref 21–32)
CREAT SERPL-MCNC: 0.9 MG/DL (ref 0.8–1.3)
GFR AFRICAN AMERICAN: >60
GFR NON-AFRICAN AMERICAN: >60
GLOBULIN: 2.5 G/DL
GLUCOSE FASTING: 87 MG/DL (ref 70–99)
HCT VFR BLD CALC: 40.6 % (ref 40.5–52.5)
HDLC SERPL-MCNC: 68 MG/DL (ref 40–60)
HEMOGLOBIN: 13.3 G/DL (ref 13.5–17.5)
INR BLD: 2.65 (ref 0.86–1.14)
LDL CHOLESTEROL CALCULATED: 63 MG/DL
MCH RBC QN AUTO: 29.9 PG (ref 26–34)
MCHC RBC AUTO-ENTMCNC: 32.9 G/DL (ref 31–36)
MCV RBC AUTO: 91.1 FL (ref 80–100)
PDW BLD-RTO: 14 % (ref 12.4–15.4)
PLATELET # BLD: 184 K/UL (ref 135–450)
PMV BLD AUTO: 10.4 FL (ref 5–10.5)
POTASSIUM SERPL-SCNC: 4.1 MMOL/L (ref 3.5–5.1)
PROTHROMBIN TIME: 29.4 SEC (ref 9.8–13)
RBC # BLD: 4.46 M/UL (ref 4.2–5.9)
SODIUM BLD-SCNC: 144 MMOL/L (ref 136–145)
TOTAL PROTEIN: 7 G/DL (ref 6.4–8.2)
TRIGLYCERIDE, FASTING: 88 MG/DL (ref 0–150)
VLDLC SERPL CALC-MCNC: 18 MG/DL
WBC # BLD: 4.7 K/UL (ref 4–11)

## 2018-12-17 PROCEDURE — 80061 LIPID PANEL: CPT

## 2018-12-17 PROCEDURE — 85610 PROTHROMBIN TIME: CPT

## 2018-12-17 PROCEDURE — 36415 COLL VENOUS BLD VENIPUNCTURE: CPT

## 2018-12-17 PROCEDURE — 80053 COMPREHEN METABOLIC PANEL: CPT

## 2018-12-17 PROCEDURE — 85027 COMPLETE CBC AUTOMATED: CPT

## 2019-02-11 ENCOUNTER — HOSPITAL ENCOUNTER (OUTPATIENT)
Age: 69
Discharge: HOME | End: 2019-02-11
Payer: COMMERCIAL

## 2019-02-11 LAB
INR BLD: 2.72 (ref 0.86–1.14)
PROTHROMBIN TIME: 31 SEC (ref 9.8–13)

## 2019-02-11 PROCEDURE — 85610 PROTHROMBIN TIME: CPT

## 2019-02-11 PROCEDURE — 36415 COLL VENOUS BLD VENIPUNCTURE: CPT

## 2019-03-30 ENCOUNTER — HOSPITAL ENCOUNTER (OUTPATIENT)
Age: 69
Discharge: HOME | End: 2019-03-30
Payer: COMMERCIAL

## 2019-03-30 LAB
INR BLD: 3.25 (ref 0.86–1.14)
PROTHROMBIN TIME: 37 SEC (ref 9.8–13)

## 2019-03-30 PROCEDURE — 85610 PROTHROMBIN TIME: CPT

## 2019-03-30 PROCEDURE — 36415 COLL VENOUS BLD VENIPUNCTURE: CPT

## 2019-05-06 ENCOUNTER — HOSPITAL ENCOUNTER (OUTPATIENT)
Age: 69
Setting detail: SPECIMEN
Discharge: HOME | End: 2019-05-06
Payer: COMMERCIAL

## 2019-05-06 LAB
INR BLD: 1.92 (ref 0.86–1.14)
PROTHROMBIN TIME: 21.9 SEC (ref 9.8–13)

## 2019-05-06 PROCEDURE — 36415 COLL VENOUS BLD VENIPUNCTURE: CPT

## 2019-05-06 PROCEDURE — 85610 PROTHROMBIN TIME: CPT

## 2019-05-31 ENCOUNTER — HOSPITAL ENCOUNTER (OUTPATIENT)
Age: 69
Discharge: HOME | End: 2019-05-31
Payer: COMMERCIAL

## 2019-05-31 LAB
A/G RATIO: 1.6 (ref 1.1–2.2)
ALBUMIN SERPL-MCNC: 4.6 G/DL (ref 3.4–5)
ALP BLD-CCNC: 83 U/L (ref 40–129)
ALT SERPL-CCNC: 45 U/L (ref 10–40)
ANION GAP SERPL CALCULATED.3IONS-SCNC: 17 MMOL/L (ref 3–16)
AST SERPL-CCNC: 35 U/L (ref 15–37)
BILIRUB SERPL-MCNC: 0.5 MG/DL (ref 0–1)
BUN BLDV-MCNC: 13 MG/DL (ref 7–20)
CALCIUM SERPL-MCNC: 9.9 MG/DL (ref 8.3–10.6)
CHLORIDE BLD-SCNC: 101 MMOL/L (ref 99–110)
CHOLESTEROL, TOTAL: 186 MG/DL (ref 0–199)
CO2: 28 MMOL/L (ref 21–32)
CREAT SERPL-MCNC: 0.9 MG/DL (ref 0.8–1.3)
GFR AFRICAN AMERICAN: >60
GFR NON-AFRICAN AMERICAN: >60
GLOBULIN: 2.8 G/DL
GLUCOSE BLD-MCNC: 84 MG/DL (ref 70–99)
HCT VFR BLD CALC: 40.6 % (ref 40.5–52.5)
HDLC SERPL-MCNC: 68 MG/DL (ref 40–60)
HEMOGLOBIN: 13.7 G/DL (ref 13.5–17.5)
HEPATITIS B SURFACE ANTIGEN INTERPRETATION: NORMAL
HEPATITIS C ANTIBODY INTERPRETATION: NORMAL
INR BLD: 1.65 (ref 0.86–1.14)
IRON SATURATION: 23 % (ref 20–50)
IRON: 64 UG/DL (ref 59–158)
LDL CHOLESTEROL CALCULATED: 96 MG/DL
MCH RBC QN AUTO: 31 PG (ref 26–34)
MCHC RBC AUTO-ENTMCNC: 33.7 G/DL (ref 31–36)
MCV RBC AUTO: 91.8 FL (ref 80–100)
PDW BLD-RTO: 14.3 % (ref 12.4–15.4)
PLATELET # BLD: 185 K/UL (ref 135–450)
PMV BLD AUTO: 10.3 FL (ref 5–10.5)
POTASSIUM SERPL-SCNC: 4 MMOL/L (ref 3.5–5.1)
PROTHROMBIN TIME: 18.8 SEC (ref 9.8–13)
RBC # BLD: 4.43 M/UL (ref 4.2–5.9)
SODIUM BLD-SCNC: 146 MMOL/L (ref 136–145)
TOTAL IRON BINDING CAPACITY: 280 UG/DL (ref 260–445)
TOTAL PROTEIN: 7.4 G/DL (ref 6.4–8.2)
TRIGL SERPL-MCNC: 112 MG/DL (ref 0–150)
VLDLC SERPL CALC-MCNC: 22 MG/DL
WBC # BLD: 4 K/UL (ref 4–11)

## 2019-05-31 PROCEDURE — 85610 PROTHROMBIN TIME: CPT

## 2019-05-31 PROCEDURE — 86038 ANTINUCLEAR ANTIBODIES: CPT

## 2019-05-31 PROCEDURE — 82390 ASSAY OF CERULOPLASMIN: CPT

## 2019-05-31 PROCEDURE — 83516 IMMUNOASSAY NONANTIBODY: CPT

## 2019-05-31 PROCEDURE — 86803 HEPATITIS C AB TEST: CPT

## 2019-05-31 PROCEDURE — 87340 HEPATITIS B SURFACE AG IA: CPT

## 2019-05-31 PROCEDURE — 85027 COMPLETE CBC AUTOMATED: CPT

## 2019-05-31 PROCEDURE — 80061 LIPID PANEL: CPT

## 2019-05-31 PROCEDURE — 82103 ALPHA-1-ANTITRYPSIN TOTAL: CPT

## 2019-05-31 PROCEDURE — 80053 COMPREHEN METABOLIC PANEL: CPT

## 2019-05-31 PROCEDURE — 83540 ASSAY OF IRON: CPT

## 2019-05-31 PROCEDURE — 36415 COLL VENOUS BLD VENIPUNCTURE: CPT

## 2019-05-31 PROCEDURE — 83550 IRON BINDING TEST: CPT

## 2019-06-01 LAB — ANTI-NUCLEAR ANTIBODY (ANA): NEGATIVE

## 2019-06-02 LAB
F-ACTIN AB IGG: 9 UNITS (ref 0–19)
MITOCHONDRIAL M2 AB, IGG: 3.6 UNITS (ref 0–20)

## 2019-06-04 LAB
ALPHA-1 ANTITRYPSIN: 124 MG/DL (ref 90–200)
CERULOPLASMIN: 22 MG/DL (ref 15–30)

## 2019-07-19 ENCOUNTER — HOSPITAL ENCOUNTER (OUTPATIENT)
Age: 69
Discharge: HOME | End: 2019-07-19
Payer: COMMERCIAL

## 2019-07-19 LAB
INR BLD: 2.21 (ref 0.86–1.14)
PROTHROMBIN TIME: 25.2 SEC (ref 9.8–13)

## 2019-07-19 PROCEDURE — 36415 COLL VENOUS BLD VENIPUNCTURE: CPT

## 2019-07-19 PROCEDURE — 85610 PROTHROMBIN TIME: CPT

## 2019-09-27 ENCOUNTER — HOSPITAL ENCOUNTER (OUTPATIENT)
Age: 69
Setting detail: SPECIMEN
Discharge: HOME | End: 2019-09-27
Payer: COMMERCIAL

## 2019-10-02 ENCOUNTER — HOSPITAL ENCOUNTER (OUTPATIENT)
Age: 69
Discharge: HOME | End: 2019-10-02
Payer: COMMERCIAL

## 2019-10-02 LAB
INR BLD: 2.25 (ref 0.86–1.14)
PROTHROMBIN TIME: 25.7 SEC (ref 9.8–13)

## 2019-10-02 PROCEDURE — 85610 PROTHROMBIN TIME: CPT

## 2019-10-02 PROCEDURE — 36415 COLL VENOUS BLD VENIPUNCTURE: CPT

## 2019-12-19 ENCOUNTER — HOSPITAL ENCOUNTER (OUTPATIENT)
Age: 69
Setting detail: SPECIMEN
Discharge: HOME | End: 2019-12-19
Payer: COMMERCIAL

## 2019-12-19 LAB
INR BLD: 1.77 (ref 0.86–1.14)
PROTHROMBIN TIME: 20.7 SEC (ref 10–13.2)

## 2019-12-19 PROCEDURE — 36415 COLL VENOUS BLD VENIPUNCTURE: CPT

## 2019-12-19 PROCEDURE — 85610 PROTHROMBIN TIME: CPT

## 2019-12-30 ENCOUNTER — HOSPITAL ENCOUNTER (OUTPATIENT)
Age: 69
Discharge: HOME | End: 2019-12-30
Payer: COMMERCIAL

## 2019-12-30 LAB
INR BLD: 2.67 (ref 0.86–1.14)
PROTHROMBIN TIME: 31.3 SEC (ref 10–13.2)

## 2019-12-30 PROCEDURE — 36415 COLL VENOUS BLD VENIPUNCTURE: CPT

## 2019-12-30 PROCEDURE — 85610 PROTHROMBIN TIME: CPT

## 2020-01-01 ENCOUNTER — HOSPITAL ENCOUNTER (OUTPATIENT)
Age: 70
Discharge: HOME | End: 2020-10-22
Payer: COMMERCIAL

## 2020-01-01 LAB
INR BLD: 2.82 (ref 0.86–1.14)
PROTHROMBIN TIME: 33 SEC (ref 10–13.2)

## 2020-01-01 PROCEDURE — 85610 PROTHROMBIN TIME: CPT

## 2020-01-01 PROCEDURE — 36415 COLL VENOUS BLD VENIPUNCTURE: CPT

## 2020-02-21 ENCOUNTER — HOSPITAL ENCOUNTER (OUTPATIENT)
Age: 70
Setting detail: SPECIMEN
Discharge: HOME | End: 2020-02-21
Payer: COMMERCIAL

## 2020-02-21 LAB
INR BLD: 1.71 (ref 0.86–1.14)
PROTHROMBIN TIME: 20 SEC (ref 10–13.2)

## 2020-02-21 PROCEDURE — 36415 COLL VENOUS BLD VENIPUNCTURE: CPT

## 2020-02-21 PROCEDURE — 85610 PROTHROMBIN TIME: CPT

## 2020-03-09 ENCOUNTER — HOSPITAL ENCOUNTER (OUTPATIENT)
Age: 70
Discharge: HOME | End: 2020-03-09
Payer: COMMERCIAL

## 2020-03-09 LAB
INR BLD: 2.86 (ref 0.86–1.14)
PROTHROMBIN TIME: 33.5 SEC (ref 10–13.2)

## 2020-03-09 PROCEDURE — 36415 COLL VENOUS BLD VENIPUNCTURE: CPT

## 2020-03-09 PROCEDURE — 85610 PROTHROMBIN TIME: CPT

## 2020-04-15 ENCOUNTER — HOSPITAL ENCOUNTER (OUTPATIENT)
Age: 70
Discharge: HOME | End: 2020-04-15
Payer: COMMERCIAL

## 2020-04-15 LAB
INR BLD: 2.98 (ref 0.86–1.14)
PROTHROMBIN TIME: 35 SEC (ref 10–13.2)

## 2020-04-15 PROCEDURE — 85610 PROTHROMBIN TIME: CPT

## 2020-04-15 PROCEDURE — 36415 COLL VENOUS BLD VENIPUNCTURE: CPT

## 2020-07-10 ENCOUNTER — HOSPITAL ENCOUNTER (OUTPATIENT)
Age: 70
Discharge: HOME | End: 2020-07-10
Payer: COMMERCIAL

## 2020-07-10 LAB
INR BLD: 2.36 (ref 0.86–1.14)
PROTHROMBIN TIME: 27.6 SEC (ref 10–13.2)

## 2020-07-10 PROCEDURE — 36415 COLL VENOUS BLD VENIPUNCTURE: CPT

## 2020-07-10 PROCEDURE — 85610 PROTHROMBIN TIME: CPT

## 2020-09-10 ENCOUNTER — HOSPITAL ENCOUNTER (OUTPATIENT)
Age: 70
Discharge: HOME | End: 2020-09-10
Payer: COMMERCIAL

## 2020-09-10 LAB
A/G RATIO: 2 (ref 1.1–2.2)
ALBUMIN SERPL-MCNC: 4.6 G/DL (ref 3.4–5)
ALP BLD-CCNC: 57 U/L (ref 40–129)
ALT SERPL-CCNC: 21 U/L (ref 10–40)
ANION GAP SERPL CALCULATED.3IONS-SCNC: 12 MMOL/L (ref 3–16)
AST SERPL-CCNC: 23 U/L (ref 15–37)
BILIRUB SERPL-MCNC: 0.7 MG/DL (ref 0–1)
BUN BLDV-MCNC: 16 MG/DL (ref 7–20)
CALCIUM SERPL-MCNC: 10.1 MG/DL (ref 8.3–10.6)
CHLORIDE BLD-SCNC: 103 MMOL/L (ref 99–110)
CHOLESTEROL, TOTAL: 203 MG/DL (ref 0–199)
CO2: 29 MMOL/L (ref 21–32)
CREAT SERPL-MCNC: 0.8 MG/DL (ref 0.8–1.3)
GFR AFRICAN AMERICAN: >60
GFR NON-AFRICAN AMERICAN: >60
GLOBULIN: 2.3 G/DL
GLUCOSE BLD-MCNC: 85 MG/DL (ref 70–99)
HCT VFR BLD CALC: 42.8 % (ref 40.5–52.5)
HDLC SERPL-MCNC: 78 MG/DL (ref 40–60)
HEMOGLOBIN: 14.3 G/DL (ref 13.5–17.5)
INR BLD: 3.46 (ref 0.86–1.14)
LDL CHOLESTEROL CALCULATED: 104 MG/DL
MCH RBC QN AUTO: 31.1 PG (ref 26–34)
MCHC RBC AUTO-ENTMCNC: 33.4 G/DL (ref 31–36)
MCV RBC AUTO: 93 FL (ref 80–100)
PDW BLD-RTO: 14.2 % (ref 12.4–15.4)
PLATELET # BLD: 155 K/UL (ref 135–450)
PMV BLD AUTO: 10.7 FL (ref 5–10.5)
POTASSIUM SERPL-SCNC: 4.7 MMOL/L (ref 3.5–5.1)
PROTHROMBIN TIME: 40.7 SEC (ref 10–13.2)
RBC # BLD: 4.6 M/UL (ref 4.2–5.9)
SODIUM BLD-SCNC: 144 MMOL/L (ref 136–145)
TOTAL PROTEIN: 6.9 G/DL (ref 6.4–8.2)
TRIGL SERPL-MCNC: 107 MG/DL (ref 0–150)
VLDLC SERPL CALC-MCNC: 21 MG/DL
WBC # BLD: 4.2 K/UL (ref 4–11)

## 2020-09-10 PROCEDURE — 36415 COLL VENOUS BLD VENIPUNCTURE: CPT

## 2020-09-10 PROCEDURE — 80061 LIPID PANEL: CPT

## 2020-09-10 PROCEDURE — 80053 COMPREHEN METABOLIC PANEL: CPT

## 2020-09-10 PROCEDURE — 85610 PROTHROMBIN TIME: CPT

## 2020-09-10 PROCEDURE — 85027 COMPLETE CBC AUTOMATED: CPT

## 2020-11-03 PROBLEM — I10 HYPERTENSION: Status: RESOLVED | Noted: 2020-01-01 | Resolved: 2020-01-01

## 2021-01-01 ENCOUNTER — APPOINTMENT (OUTPATIENT)
Dept: CT IMAGING | Age: 71
DRG: 190 | End: 2021-01-01
Payer: COMMERCIAL

## 2021-01-01 ENCOUNTER — HOSPITAL ENCOUNTER (OUTPATIENT)
Age: 71
Discharge: HOME | End: 2021-06-29
Payer: COMMERCIAL

## 2021-01-01 ENCOUNTER — APPOINTMENT (OUTPATIENT)
Dept: GENERAL RADIOLOGY | Age: 71
DRG: 190 | End: 2021-01-01
Payer: COMMERCIAL

## 2021-01-01 ENCOUNTER — HOSPITAL ENCOUNTER (OUTPATIENT)
Age: 71
Discharge: HOME | End: 2021-05-18
Payer: COMMERCIAL

## 2021-01-01 ENCOUNTER — HOSPITAL ENCOUNTER (OUTPATIENT)
Age: 71
Setting detail: SPECIMEN
Discharge: HOME | End: 2021-08-25
Payer: COMMERCIAL

## 2021-01-01 ENCOUNTER — APPOINTMENT (OUTPATIENT)
Dept: INTERVENTIONAL RADIOLOGY/VASCULAR | Age: 71
DRG: 190 | End: 2021-01-01
Payer: COMMERCIAL

## 2021-01-01 ENCOUNTER — APPOINTMENT (OUTPATIENT)
Dept: VASCULAR LAB | Age: 71
DRG: 190 | End: 2021-01-01
Payer: COMMERCIAL

## 2021-01-01 ENCOUNTER — HOSPITAL ENCOUNTER (INPATIENT)
Age: 71
LOS: 13 days | Discharge: HOSPICE | DRG: 190 | End: 2021-09-24
Attending: STUDENT IN AN ORGANIZED HEALTH CARE EDUCATION/TRAINING PROGRAM | Admitting: INTERNAL MEDICINE
Payer: COMMERCIAL

## 2021-01-01 ENCOUNTER — NURSE TRIAGE (OUTPATIENT)
Dept: OTHER | Facility: CLINIC | Age: 71
End: 2021-01-01

## 2021-01-01 ENCOUNTER — HOSPITAL ENCOUNTER (OUTPATIENT)
Dept: MRI IMAGING | Age: 71
Discharge: HOME | End: 2021-09-03
Payer: COMMERCIAL

## 2021-01-01 ENCOUNTER — HOSPITAL ENCOUNTER (INPATIENT)
Age: 71
LOS: 1 days | DRG: 951 | End: 2021-09-24
Attending: INTERNAL MEDICINE | Admitting: INTERNAL MEDICINE
Payer: COMMERCIAL

## 2021-01-01 ENCOUNTER — HOSPITAL ENCOUNTER (OUTPATIENT)
Age: 71
Discharge: HOME | End: 2021-03-25
Payer: COMMERCIAL

## 2021-01-01 ENCOUNTER — ANESTHESIA EVENT (OUTPATIENT)
Dept: ENDOSCOPY | Age: 71
DRG: 190 | End: 2021-01-01
Payer: COMMERCIAL

## 2021-01-01 ENCOUNTER — HOSPITAL ENCOUNTER (OUTPATIENT)
Age: 71
Discharge: HOME | End: 2021-06-17
Payer: COMMERCIAL

## 2021-01-01 ENCOUNTER — HOSPITAL ENCOUNTER (OUTPATIENT)
Age: 71
Discharge: HOME | End: 2021-01-13
Payer: COMMERCIAL

## 2021-01-01 ENCOUNTER — HOSPITAL ENCOUNTER (OUTPATIENT)
Dept: CT IMAGING | Age: 71
Discharge: HOME | End: 2021-09-03
Payer: COMMERCIAL

## 2021-01-01 ENCOUNTER — ANESTHESIA (OUTPATIENT)
Dept: ENDOSCOPY | Age: 71
DRG: 190 | End: 2021-01-01
Payer: COMMERCIAL

## 2021-01-01 ENCOUNTER — HOSPITAL ENCOUNTER (OUTPATIENT)
Age: 71
Discharge: HOME | End: 2021-05-07
Payer: COMMERCIAL

## 2021-01-01 ENCOUNTER — HOSPITAL ENCOUNTER (OUTPATIENT)
Age: 71
Discharge: HOME | End: 2021-08-23
Payer: COMMERCIAL

## 2021-01-01 VITALS
TEMPERATURE: 96.1 F | WEIGHT: 152.9 LBS | HEART RATE: 72 BPM | DIASTOLIC BLOOD PRESSURE: 59 MMHG | HEIGHT: 69 IN | BODY MASS INDEX: 22.64 KG/M2 | RESPIRATION RATE: 20 BRPM | SYSTOLIC BLOOD PRESSURE: 112 MMHG | OXYGEN SATURATION: 91 %

## 2021-01-01 VITALS
DIASTOLIC BLOOD PRESSURE: 60 MMHG | RESPIRATION RATE: 13 BRPM | OXYGEN SATURATION: 93 % | SYSTOLIC BLOOD PRESSURE: 95 MMHG

## 2021-01-01 DIAGNOSIS — R79.89 ELEVATED BRAIN NATRIURETIC PEPTIDE (BNP) LEVEL: ICD-10-CM

## 2021-01-01 DIAGNOSIS — R29.898 WEAKNESS OF BOTH LOWER EXTREMITIES: ICD-10-CM

## 2021-01-01 DIAGNOSIS — R79.1 SUPRATHERAPEUTIC INTERNATIONAL NORMALIZED RATIO (INR): ICD-10-CM

## 2021-01-01 DIAGNOSIS — R63.4 WEIGHT LOSS: ICD-10-CM

## 2021-01-01 DIAGNOSIS — R19.7 DIARRHEA, UNSPECIFIED TYPE: ICD-10-CM

## 2021-01-01 DIAGNOSIS — R53.1 GENERAL WEAKNESS: Primary | ICD-10-CM

## 2021-01-01 DIAGNOSIS — J18.9 PNEUMONIA DUE TO INFECTIOUS ORGANISM, UNSPECIFIED LATERALITY, UNSPECIFIED PART OF LUNG: ICD-10-CM

## 2021-01-01 LAB
A/G RATIO: 0.8 (ref 1.1–2.2)
A/G RATIO: 0.9 (ref 1.1–2.2)
A/G RATIO: 0.9 (ref 1.1–2.2)
A/G RATIO: 1 (ref 1.1–2.2)
A/G RATIO: 1.1 (ref 1.1–2.2)
A/G RATIO: 1.4 (ref 1.1–2.2)
A/G RATIO: 1.7 (ref 1.1–2.2)
A/G RATIO: 1.8 (ref 1.1–2.2)
AFP: 1 UG/L
ALBUMIN SERPL-MCNC: 2.2 G/DL (ref 3.4–5)
ALBUMIN SERPL-MCNC: 2.4 G/DL (ref 3.4–5)
ALBUMIN SERPL-MCNC: 2.5 G/DL (ref 3.4–5)
ALBUMIN SERPL-MCNC: 2.6 G/DL (ref 3.4–5)
ALBUMIN SERPL-MCNC: 2.7 G/DL (ref 3.1–4.9)
ALBUMIN SERPL-MCNC: 2.7 G/DL (ref 3.4–5)
ALBUMIN SERPL-MCNC: 3.3 G/DL (ref 3.4–5)
ALBUMIN SERPL-MCNC: 3.6 G/DL (ref 3.4–5)
ALBUMIN SERPL-MCNC: 3.8 G/DL (ref 3.4–5)
ALDOLASE: 5.3 U/L (ref 1.5–8.1)
ALP BLD-CCNC: 117 U/L (ref 40–129)
ALP BLD-CCNC: 172 U/L (ref 40–129)
ALP BLD-CCNC: 198 U/L (ref 40–129)
ALP BLD-CCNC: 218 U/L (ref 40–129)
ALP BLD-CCNC: 313 U/L (ref 40–129)
ALP BLD-CCNC: 316 U/L (ref 40–129)
ALP BLD-CCNC: 369 U/L (ref 40–129)
ALP BLD-CCNC: 394 U/L (ref 40–129)
ALP BLD-CCNC: 400 U/L (ref 40–129)
ALP BLD-CCNC: 433 U/L (ref 40–129)
ALP BLD-CCNC: 433 U/L (ref 40–129)
ALP BLD-CCNC: 477 U/L (ref 40–129)
ALP BLD-CCNC: 82 U/L (ref 40–129)
ALPHA-1 ANTITRYPSIN PHENOTYPE: NORMAL
ALPHA-1 ANTITRYPSIN: 172 MG/DL (ref 90–200)
ALPHA-1-GLOBULIN: 0.3 G/DL (ref 0.2–0.4)
ALPHA-2-GLOBULIN: 0.6 G/DL (ref 0.4–1.1)
ALT SERPL-CCNC: 112 U/L (ref 10–40)
ALT SERPL-CCNC: 135 U/L (ref 10–40)
ALT SERPL-CCNC: 141 U/L (ref 10–40)
ALT SERPL-CCNC: 155 U/L (ref 10–40)
ALT SERPL-CCNC: 158 U/L (ref 10–40)
ALT SERPL-CCNC: 160 U/L (ref 10–40)
ALT SERPL-CCNC: 166 U/L (ref 10–40)
ALT SERPL-CCNC: 195 U/L (ref 10–40)
ALT SERPL-CCNC: 202 U/L (ref 10–40)
ALT SERPL-CCNC: 246 U/L (ref 10–40)
ALT SERPL-CCNC: 75 U/L (ref 10–40)
AMMONIA: 12 UMOL/L (ref 16–60)
ANION GAP SERPL CALCULATED.3IONS-SCNC: 10 MMOL/L (ref 3–16)
ANION GAP SERPL CALCULATED.3IONS-SCNC: 10 MMOL/L (ref 3–16)
ANION GAP SERPL CALCULATED.3IONS-SCNC: 11 MMOL/L (ref 3–16)
ANION GAP SERPL CALCULATED.3IONS-SCNC: 11 MMOL/L (ref 3–16)
ANION GAP SERPL CALCULATED.3IONS-SCNC: 12 MMOL/L (ref 3–16)
ANION GAP SERPL CALCULATED.3IONS-SCNC: 13 MMOL/L (ref 3–16)
ANION GAP SERPL CALCULATED.3IONS-SCNC: 5 MMOL/L (ref 3–16)
ANION GAP SERPL CALCULATED.3IONS-SCNC: 5 MMOL/L (ref 3–16)
ANION GAP SERPL CALCULATED.3IONS-SCNC: 7 MMOL/L (ref 3–16)
ANION GAP SERPL CALCULATED.3IONS-SCNC: 7 MMOL/L (ref 3–16)
ANION GAP SERPL CALCULATED.3IONS-SCNC: 8 MMOL/L (ref 3–16)
ANION GAP SERPL CALCULATED.3IONS-SCNC: 8 MMOL/L (ref 3–16)
ANION GAP SERPL CALCULATED.3IONS-SCNC: 9 MMOL/L (ref 3–16)
ANION GAP SERPL CALCULATED.3IONS-SCNC: 9 MMOL/L (ref 3–16)
ANTI-NUCLEAR ANTIBODY (ANA): NEGATIVE
APTT: 87.8 SEC (ref 26.2–38.6)
AST SERPL-CCNC: 103 U/L (ref 15–37)
AST SERPL-CCNC: 104 U/L (ref 15–37)
AST SERPL-CCNC: 105 U/L (ref 15–37)
AST SERPL-CCNC: 132 U/L (ref 15–37)
AST SERPL-CCNC: 139 U/L (ref 15–37)
AST SERPL-CCNC: 149 U/L (ref 15–37)
AST SERPL-CCNC: 149 U/L (ref 15–37)
AST SERPL-CCNC: 172 U/L (ref 15–37)
AST SERPL-CCNC: 188 U/L (ref 15–37)
AST SERPL-CCNC: 301 U/L (ref 15–37)
AST SERPL-CCNC: 61 U/L (ref 15–37)
AST SERPL-CCNC: 85 U/L (ref 15–37)
AST SERPL-CCNC: 98 U/L (ref 15–37)
BANDED NEUTROPHILS RELATIVE PERCENT: 12 % (ref 0–7)
BASOPHILS ABSOLUTE: 0 K/UL (ref 0–0.2)
BASOPHILS RELATIVE PERCENT: 0 %
BASOPHILS RELATIVE PERCENT: 0 %
BASOPHILS RELATIVE PERCENT: 0.1 %
BETA GLOBULIN: 0.6 G/DL (ref 0.9–1.6)
BILIRUB SERPL-MCNC: 0.4 MG/DL (ref 0–1)
BILIRUB SERPL-MCNC: 0.5 MG/DL (ref 0–1)
BILIRUB SERPL-MCNC: 0.6 MG/DL (ref 0–1)
BILIRUB SERPL-MCNC: 0.7 MG/DL (ref 0–1)
BILIRUB SERPL-MCNC: 0.7 MG/DL (ref 0–1)
BILIRUB SERPL-MCNC: 0.8 MG/DL (ref 0–1)
BILIRUB SERPL-MCNC: 0.9 MG/DL (ref 0–1)
BILIRUB SERPL-MCNC: 1 MG/DL (ref 0–1)
BILIRUBIN URINE: NEGATIVE
BILIRUBIN URINE: NEGATIVE
BLOOD CULTURE, ROUTINE: NORMAL
BLOOD, URINE: ABNORMAL
BLOOD, URINE: NEGATIVE
BUN BLDV-MCNC: 21 MG/DL (ref 7–20)
BUN BLDV-MCNC: 23 MG/DL (ref 7–20)
BUN BLDV-MCNC: 28 MG/DL (ref 7–20)
BUN BLDV-MCNC: 33 MG/DL (ref 7–20)
BUN BLDV-MCNC: 35 MG/DL (ref 7–20)
BUN BLDV-MCNC: 37 MG/DL (ref 7–20)
BUN BLDV-MCNC: 38 MG/DL (ref 7–20)
BUN BLDV-MCNC: 39 MG/DL (ref 7–20)
BUN BLDV-MCNC: 40 MG/DL (ref 7–20)
BUN BLDV-MCNC: 41 MG/DL (ref 7–20)
BUN BLDV-MCNC: 49 MG/DL (ref 7–20)
BUN BLDV-MCNC: 51 MG/DL (ref 7–20)
BUN BLDV-MCNC: 53 MG/DL (ref 7–20)
BUN BLDV-MCNC: 72 MG/DL (ref 7–20)
C-REACTIVE PROTEIN: 178.4 MG/L (ref 0–5.1)
C282Y HEMOCHROMATOSIS MUT: NEGATIVE
CALCIUM SERPL-MCNC: 8.1 MG/DL (ref 8.3–10.6)
CALCIUM SERPL-MCNC: 8.3 MG/DL (ref 8.3–10.6)
CALCIUM SERPL-MCNC: 8.4 MG/DL (ref 8.3–10.6)
CALCIUM SERPL-MCNC: 8.4 MG/DL (ref 8.3–10.6)
CALCIUM SERPL-MCNC: 8.5 MG/DL (ref 8.3–10.6)
CALCIUM SERPL-MCNC: 8.6 MG/DL (ref 8.3–10.6)
CALCIUM SERPL-MCNC: 8.7 MG/DL (ref 8.3–10.6)
CALCIUM SERPL-MCNC: 8.9 MG/DL (ref 8.3–10.6)
CALCIUM SERPL-MCNC: 9.1 MG/DL (ref 8.3–10.6)
CHLORIDE BLD-SCNC: 100 MMOL/L (ref 99–110)
CHLORIDE BLD-SCNC: 102 MMOL/L (ref 99–110)
CHLORIDE BLD-SCNC: 103 MMOL/L (ref 99–110)
CHLORIDE BLD-SCNC: 105 MMOL/L (ref 99–110)
CHLORIDE BLD-SCNC: 94 MMOL/L (ref 99–110)
CHLORIDE BLD-SCNC: 95 MMOL/L (ref 99–110)
CHLORIDE BLD-SCNC: 98 MMOL/L (ref 99–110)
CHLORIDE BLD-SCNC: 98 MMOL/L (ref 99–110)
CHLORIDE BLD-SCNC: 99 MMOL/L (ref 99–110)
CHOLESTEROL, TOTAL: 93 MG/DL (ref 0–199)
CHP ED QC CHECK: NORMAL
CLARITY: CLEAR
CLARITY: CLEAR
CO2: 23 MMOL/L (ref 21–32)
CO2: 25 MMOL/L (ref 21–32)
CO2: 26 MMOL/L (ref 21–32)
CO2: 26 MMOL/L (ref 21–32)
CO2: 28 MMOL/L (ref 21–32)
CO2: 29 MMOL/L (ref 21–32)
CO2: 31 MMOL/L (ref 21–32)
CO2: 31 MMOL/L (ref 21–32)
COAG INTERPRETATION: NORMAL
COLOR: YELLOW
COLOR: YELLOW
CORTISOL TOTAL: 36.4 UG/DL
CREAT SERPL-MCNC: 0.6 MG/DL (ref 0.8–1.3)
CREAT SERPL-MCNC: 0.7 MG/DL (ref 0.8–1.3)
CREAT SERPL-MCNC: 0.7 MG/DL (ref 0.8–1.3)
CREAT SERPL-MCNC: 0.8 MG/DL (ref 0.8–1.3)
CREAT SERPL-MCNC: 0.9 MG/DL (ref 0.8–1.3)
CREAT SERPL-MCNC: <0.5 MG/DL (ref 0.8–1.3)
CULTURE, BLOOD 2: NORMAL
CYTOMEGALOVIRUS IGM ANTIBODY: <8 AU/ML
DRVVT CONFIRMATION TEST: ABNORMAL RATIO
DRVVT SCREEN: 38 SEC (ref 33–44)
DRVVT,DIL: ABNORMAL SEC (ref 33–44)
EKG ATRIAL RATE: 133 BPM
EKG DIAGNOSIS: NORMAL
EKG Q-T INTERVAL: 478 MS
EKG QRS DURATION: 166 MS
EKG QTC CALCULATION (BAZETT): 461 MS
EKG R AXIS: 90 DEGREES
EKG T AXIS: -54 DEGREES
EKG VENTRICULAR RATE: 56 BPM
EOSINOPHILS ABSOLUTE: 0 K/UL (ref 0–0.6)
EOSINOPHILS RELATIVE PERCENT: 0 %
EOSINOPHILS RELATIVE PERCENT: 0.1 %
EPITHELIAL CELLS, UA: ABNORMAL /HPF (ref 0–5)
EPSTEIN BARR VIRUS NUCLEAR AB IGG: 131 U/ML (ref 0–21.9)
EPSTEIN-BARR EARLY ANTIGEN ANTIBODY: <5 U/ML (ref 0–10.9)
EPSTEIN-BARR VCA IGG: 289 U/ML (ref 0–21.9)
EPSTEIN-BARR VCA IGM: <10 U/ML (ref 0–43.9)
ESTIMATED AVERAGE GLUCOSE: 102.5 MG/DL
F-ACTIN AB IGG: 4 UNITS (ref 0–19)
FACTOR VII ACTIVITY: 60 % (ref 80–181)
FECAL NEUTRAL FAT: NORMAL
FECAL SPLIT FATS: NORMAL
FERRITIN: 2731 NG/ML (ref 30–400)
FIBRINOGEN: 681 MG/DL (ref 200–397)
FOLATE: 12.13 NG/ML (ref 4.78–24.2)
GAMMA GLOBULIN: 0.6 G/DL (ref 0.6–1.8)
GFR AFRICAN AMERICAN: >60
GFR NON-AFRICAN AMERICAN: >60
GLOBULIN: 1.8 G/DL
GLOBULIN: 2.1 G/DL
GLOBULIN: 2.1 G/DL
GLOBULIN: 2.3 G/DL
GLOBULIN: 2.4 G/DL
GLOBULIN: 2.5 G/DL
GLOBULIN: 2.6 G/DL
GLOBULIN: 2.7 G/DL
GLOBULIN: 2.7 G/DL
GLOBULIN: 2.9 G/DL
GLOBULIN: 2.9 G/DL
GLUCOSE BLD-MCNC: 100 MG/DL (ref 70–99)
GLUCOSE BLD-MCNC: 101 MG/DL (ref 70–99)
GLUCOSE BLD-MCNC: 103 MG/DL (ref 70–99)
GLUCOSE BLD-MCNC: 106 MG/DL (ref 70–99)
GLUCOSE BLD-MCNC: 106 MG/DL (ref 70–99)
GLUCOSE BLD-MCNC: 114 MG/DL (ref 70–99)
GLUCOSE BLD-MCNC: 115 MG/DL (ref 70–99)
GLUCOSE BLD-MCNC: 117 MG/DL (ref 70–99)
GLUCOSE BLD-MCNC: 118 MG/DL (ref 70–99)
GLUCOSE BLD-MCNC: 118 MG/DL (ref 70–99)
GLUCOSE BLD-MCNC: 119 MG/DL (ref 70–99)
GLUCOSE BLD-MCNC: 119 MG/DL (ref 70–99)
GLUCOSE BLD-MCNC: 120 MG/DL (ref 70–99)
GLUCOSE BLD-MCNC: 121 MG/DL (ref 70–99)
GLUCOSE BLD-MCNC: 124 MG/DL (ref 70–99)
GLUCOSE BLD-MCNC: 125 MG/DL (ref 70–99)
GLUCOSE BLD-MCNC: 129 MG/DL (ref 70–99)
GLUCOSE BLD-MCNC: 129 MG/DL (ref 70–99)
GLUCOSE BLD-MCNC: 133 MG/DL (ref 70–99)
GLUCOSE BLD-MCNC: 153 MG/DL (ref 70–99)
GLUCOSE BLD-MCNC: 178 MG/DL (ref 70–99)
GLUCOSE BLD-MCNC: 35 MG/DL (ref 70–99)
GLUCOSE BLD-MCNC: 36 MG/DL (ref 70–99)
GLUCOSE BLD-MCNC: 41 MG/DL (ref 70–99)
GLUCOSE BLD-MCNC: 42 MG/DL (ref 70–99)
GLUCOSE BLD-MCNC: 42 MG/DL (ref 70–99)
GLUCOSE BLD-MCNC: 43 MG/DL (ref 70–99)
GLUCOSE BLD-MCNC: 45 MG/DL (ref 70–99)
GLUCOSE BLD-MCNC: 45 MG/DL (ref 70–99)
GLUCOSE BLD-MCNC: 47 MG/DL (ref 70–99)
GLUCOSE BLD-MCNC: 47 MG/DL (ref 70–99)
GLUCOSE BLD-MCNC: 49 MG/DL (ref 70–99)
GLUCOSE BLD-MCNC: 50 MG/DL (ref 70–99)
GLUCOSE BLD-MCNC: 51 MG/DL (ref 70–99)
GLUCOSE BLD-MCNC: 52 MG/DL (ref 70–99)
GLUCOSE BLD-MCNC: 53 MG/DL (ref 70–99)
GLUCOSE BLD-MCNC: 54 MG/DL (ref 70–99)
GLUCOSE BLD-MCNC: 55 MG/DL (ref 70–99)
GLUCOSE BLD-MCNC: 56 MG/DL (ref 70–99)
GLUCOSE BLD-MCNC: 57 MG/DL (ref 70–99)
GLUCOSE BLD-MCNC: 59 MG/DL (ref 70–99)
GLUCOSE BLD-MCNC: 59 MG/DL (ref 70–99)
GLUCOSE BLD-MCNC: 60 MG/DL (ref 70–99)
GLUCOSE BLD-MCNC: 60 MG/DL (ref 70–99)
GLUCOSE BLD-MCNC: 61 MG/DL
GLUCOSE BLD-MCNC: 61 MG/DL (ref 70–99)
GLUCOSE BLD-MCNC: 61 MG/DL (ref 70–99)
GLUCOSE BLD-MCNC: 62 MG/DL (ref 70–99)
GLUCOSE BLD-MCNC: 64 MG/DL (ref 70–99)
GLUCOSE BLD-MCNC: 64 MG/DL (ref 70–99)
GLUCOSE BLD-MCNC: 65 MG/DL (ref 70–99)
GLUCOSE BLD-MCNC: 66 MG/DL (ref 70–99)
GLUCOSE BLD-MCNC: 66 MG/DL (ref 70–99)
GLUCOSE BLD-MCNC: 67 MG/DL (ref 70–99)
GLUCOSE BLD-MCNC: 67 MG/DL (ref 70–99)
GLUCOSE BLD-MCNC: 68 MG/DL (ref 70–99)
GLUCOSE BLD-MCNC: 69 MG/DL (ref 70–99)
GLUCOSE BLD-MCNC: 70 MG/DL (ref 70–99)
GLUCOSE BLD-MCNC: 70 MG/DL (ref 70–99)
GLUCOSE BLD-MCNC: 71 MG/DL (ref 70–99)
GLUCOSE BLD-MCNC: 72 MG/DL (ref 70–99)
GLUCOSE BLD-MCNC: 73 MG/DL (ref 70–99)
GLUCOSE BLD-MCNC: 73 MG/DL (ref 70–99)
GLUCOSE BLD-MCNC: 74 MG/DL (ref 70–99)
GLUCOSE BLD-MCNC: 75 MG/DL (ref 70–99)
GLUCOSE BLD-MCNC: 76 MG/DL (ref 70–99)
GLUCOSE BLD-MCNC: 77 MG/DL (ref 70–99)
GLUCOSE BLD-MCNC: 77 MG/DL (ref 70–99)
GLUCOSE BLD-MCNC: 79 MG/DL (ref 70–99)
GLUCOSE BLD-MCNC: 80 MG/DL (ref 70–99)
GLUCOSE BLD-MCNC: 81 MG/DL (ref 70–99)
GLUCOSE BLD-MCNC: 82 MG/DL (ref 70–99)
GLUCOSE BLD-MCNC: 83 MG/DL (ref 70–99)
GLUCOSE BLD-MCNC: 84 MG/DL (ref 70–99)
GLUCOSE BLD-MCNC: 84 MG/DL (ref 70–99)
GLUCOSE BLD-MCNC: 86 MG/DL (ref 70–99)
GLUCOSE BLD-MCNC: 86 MG/DL (ref 70–99)
GLUCOSE BLD-MCNC: 88 MG/DL (ref 70–99)
GLUCOSE BLD-MCNC: 88 MG/DL (ref 70–99)
GLUCOSE BLD-MCNC: 89 MG/DL (ref 70–99)
GLUCOSE BLD-MCNC: 89 MG/DL (ref 70–99)
GLUCOSE BLD-MCNC: 91 MG/DL (ref 70–99)
GLUCOSE BLD-MCNC: 91 MG/DL (ref 70–99)
GLUCOSE BLD-MCNC: 92 MG/DL (ref 70–99)
GLUCOSE BLD-MCNC: 93 MG/DL (ref 70–99)
GLUCOSE BLD-MCNC: 94 MG/DL (ref 70–99)
GLUCOSE BLD-MCNC: 95 MG/DL (ref 70–99)
GLUCOSE BLD-MCNC: 96 MG/DL (ref 70–99)
GLUCOSE BLD-MCNC: 97 MG/DL (ref 70–99)
GLUCOSE BLD-MCNC: 98 MG/DL (ref 70–99)
GLUCOSE BLD-MCNC: 99 MG/DL (ref 70–99)
GLUCOSE URINE: NEGATIVE MG/DL
GLUCOSE URINE: NEGATIVE MG/DL
H63D HEMOCHROMATOSIS MUT: NEGATIVE
HAPTOGLOBIN: 107 MG/DL (ref 30–200)
HAV IGM SER IA-ACNC: NORMAL
HBA1C MFR BLD: 5.2 %
HCT VFR BLD CALC: 31.2 % (ref 40.5–52.5)
HCT VFR BLD CALC: 31.4 % (ref 40.5–52.5)
HCT VFR BLD CALC: 35.3 % (ref 40.5–52.5)
HCT VFR BLD CALC: 36.9 % (ref 40.5–52.5)
HCT VFR BLD CALC: 36.9 % (ref 40.5–52.5)
HCT VFR BLD CALC: 37.1 % (ref 40.5–52.5)
HCT VFR BLD CALC: 37.9 % (ref 40.5–52.5)
HCT VFR BLD CALC: 38.9 % (ref 40.5–52.5)
HCT VFR BLD CALC: 39.1 % (ref 40.5–52.5)
HCT VFR BLD CALC: 39.4 % (ref 40.5–52.5)
HCT VFR BLD CALC: 40.2 % (ref 40.5–52.5)
HDLC SERPL-MCNC: 48 MG/DL (ref 40–60)
HEMOCHROMATOSIS GENE ANALYSIS: NORMAL
HEMOGLOBIN: 10.5 G/DL (ref 13.5–17.5)
HEMOGLOBIN: 10.7 G/DL (ref 13.5–17.5)
HEMOGLOBIN: 11.9 G/DL (ref 13.5–17.5)
HEMOGLOBIN: 12.1 G/DL (ref 13.5–17.5)
HEMOGLOBIN: 12.4 G/DL (ref 13.5–17.5)
HEMOGLOBIN: 12.7 G/DL (ref 13.5–17.5)
HEMOGLOBIN: 12.9 G/DL (ref 13.5–17.5)
HEMOGLOBIN: 13 G/DL (ref 13.5–17.5)
HEPATITIS B CORE IGM ANTIBODY: NORMAL
HEPATITIS B SURFACE ANTIGEN INTERPRETATION: NORMAL
HEPATITIS C ANTIBODY INTERPRETATION: NORMAL
HEPATITIS C ANTIBODY INTERPRETATION: NORMAL
HERPES SIMPLEX VIRUS BY PCR: DETECTED
HEXAGONAL PHOSPHOLIPID NEUTRALIZAT TEST: POSITIVE
HFE PCR SPECIMEN: NORMAL
HIV AG/AB: NORMAL
HIV ANTIGEN: NORMAL
HIV-1 ANTIBODY: NORMAL
HIV-2 AB: NORMAL
HSV SOURCE: ABNORMAL
IGA: 102 MG/DL (ref 70–400)
IGG: 625 MG/DL (ref 700–1600)
IGM: 54 MG/DL (ref 40–230)
IMMATURE RETIC FRACT: 0.42 (ref 0.21–0.37)
INR BLD: 1.38 (ref 0.88–1.12)
INR BLD: 1.4 (ref 0.88–1.12)
INR BLD: 1.49 (ref 0.88–1.12)
INR BLD: 1.5 (ref 0.88–1.12)
INR BLD: 1.52 (ref 0.88–1.12)
INR BLD: 1.85 (ref 0.88–1.12)
INR BLD: 1.94 (ref 0.88–1.12)
INR BLD: 1.95 (ref 0.88–1.12)
INR BLD: 12.34 (ref 0.88–1.12)
INR BLD: 13.22 (ref 0.88–1.12)
INR BLD: 2.01 (ref 0.86–1.14)
INR BLD: 2.39 (ref 0.88–1.12)
INR BLD: 2.71 (ref 0.86–1.14)
INR BLD: 2.91 (ref 0.86–1.14)
INR BLD: 3.31 (ref 0.86–1.14)
INR BLD: 4.15 (ref 0.86–1.14)
INR BLD: >14.27 (ref 0.88–1.12)
IRON SATURATION: 39 % (ref 20–50)
IRON: 40 UG/DL (ref 59–158)
KETONES, URINE: NEGATIVE MG/DL
KETONES, URINE: NEGATIVE MG/DL
LACTATE DEHYDROGENASE: 320 U/L (ref 100–190)
LACTIC ACID, SEPSIS: 2.3 MMOL/L (ref 0.4–1.9)
LDL CHOLESTEROL CALCULATED: 29 MG/DL
LEUKOCYTE ESTERASE, URINE: NEGATIVE
LEUKOCYTE ESTERASE, URINE: NEGATIVE
LUPUS ANTICOAG INTERP: ABNORMAL
LV EF: 48 %
LVEF MODALITY: NORMAL
LYMPHOCYTES ABSOLUTE: 0.2 K/UL (ref 1–5.1)
LYMPHOCYTES ABSOLUTE: 0.3 K/UL (ref 1–5.1)
LYMPHOCYTES ABSOLUTE: 0.4 K/UL (ref 1–5.1)
LYMPHOCYTES ABSOLUTE: 0.4 K/UL (ref 1–5.1)
LYMPHOCYTES ABSOLUTE: 0.7 K/UL (ref 1–5.1)
LYMPHOCYTES RELATIVE PERCENT: 4 %
LYMPHOCYTES RELATIVE PERCENT: 4.3 %
LYMPHOCYTES RELATIVE PERCENT: 7.2 %
LYMPHOCYTES RELATIVE PERCENT: 8.8 %
LYMPHOCYTES RELATIVE PERCENT: 9.9 %
MAGNESIUM: 1.7 MG/DL (ref 1.8–2.4)
MAGNESIUM: 1.8 MG/DL (ref 1.8–2.4)
MAGNESIUM: 1.9 MG/DL (ref 1.8–2.4)
MAGNESIUM: 2 MG/DL (ref 1.8–2.4)
MAGNESIUM: 2.2 MG/DL (ref 1.8–2.4)
MCH RBC QN AUTO: 31.1 PG (ref 26–34)
MCH RBC QN AUTO: 31.4 PG (ref 26–34)
MCH RBC QN AUTO: 31.4 PG (ref 26–34)
MCH RBC QN AUTO: 31.6 PG (ref 26–34)
MCH RBC QN AUTO: 31.6 PG (ref 26–34)
MCH RBC QN AUTO: 31.9 PG (ref 26–34)
MCH RBC QN AUTO: 31.9 PG (ref 26–34)
MCH RBC QN AUTO: 32 PG (ref 26–34)
MCH RBC QN AUTO: 32 PG (ref 26–34)
MCH RBC QN AUTO: 32.6 PG (ref 26–34)
MCHC RBC AUTO-ENTMCNC: 32.1 G/DL (ref 31–36)
MCHC RBC AUTO-ENTMCNC: 32.1 G/DL (ref 31–36)
MCHC RBC AUTO-ENTMCNC: 32.6 G/DL (ref 31–36)
MCHC RBC AUTO-ENTMCNC: 32.8 G/DL (ref 31–36)
MCHC RBC AUTO-ENTMCNC: 32.9 G/DL (ref 31–36)
MCHC RBC AUTO-ENTMCNC: 33.2 G/DL (ref 31–36)
MCHC RBC AUTO-ENTMCNC: 33.3 G/DL (ref 31–36)
MCHC RBC AUTO-ENTMCNC: 33.6 G/DL (ref 31–36)
MCHC RBC AUTO-ENTMCNC: 34.2 G/DL (ref 31–36)
MCHC RBC AUTO-ENTMCNC: 34.4 G/DL (ref 31–36)
MCV RBC AUTO: 93.2 FL (ref 80–100)
MCV RBC AUTO: 95.1 FL (ref 80–100)
MCV RBC AUTO: 95.2 FL (ref 80–100)
MCV RBC AUTO: 95.5 FL (ref 80–100)
MCV RBC AUTO: 95.6 FL (ref 80–100)
MCV RBC AUTO: 96 FL (ref 80–100)
MCV RBC AUTO: 96.3 FL (ref 80–100)
MCV RBC AUTO: 96.3 FL (ref 80–100)
MCV RBC AUTO: 96.8 FL (ref 80–100)
MCV RBC AUTO: 99.3 FL (ref 80–100)
MICROSCOPIC EXAMINATION: NORMAL
MICROSCOPIC EXAMINATION: YES
MONOCYTES ABSOLUTE: 0 K/UL (ref 0–1.3)
MONOCYTES ABSOLUTE: 0.1 K/UL (ref 0–1.3)
MONOCYTES ABSOLUTE: 0.1 K/UL (ref 0–1.3)
MONOCYTES ABSOLUTE: 0.2 K/UL (ref 0–1.3)
MONOCYTES ABSOLUTE: 0.2 K/UL (ref 0–1.3)
MONOCYTES RELATIVE PERCENT: 0.4 %
MONOCYTES RELATIVE PERCENT: 1 %
MONOCYTES RELATIVE PERCENT: 2.1 %
MONOCYTES RELATIVE PERCENT: 3.4 %
MONOCYTES RELATIVE PERCENT: 4.9 %
MRSA SCREEN RT-PCR: NORMAL
NEUTROPHILS ABSOLUTE: 1.9 K/UL (ref 1.7–7.7)
NEUTROPHILS ABSOLUTE: 3.6 K/UL (ref 1.7–7.7)
NEUTROPHILS ABSOLUTE: 7.4 K/UL (ref 1.7–7.7)
NEUTROPHILS ABSOLUTE: 8.4 K/UL (ref 1.7–7.7)
NEUTROPHILS ABSOLUTE: 9.7 K/UL (ref 1.7–7.7)
NEUTROPHILS RELATIVE PERCENT: 83 %
NEUTROPHILS RELATIVE PERCENT: 85.1 %
NEUTROPHILS RELATIVE PERCENT: 87.6 %
NEUTROPHILS RELATIVE PERCENT: 90.5 %
NEUTROPHILS RELATIVE PERCENT: 95.1 %
NITRITE, URINE: NEGATIVE
NITRITE, URINE: NEGATIVE
OCCULT BLOOD DIAGNOSTIC: NORMAL
PDW BLD-RTO: 13.7 % (ref 12.4–15.4)
PDW BLD-RTO: 14.1 % (ref 12.4–15.4)
PDW BLD-RTO: 14.5 % (ref 12.4–15.4)
PDW BLD-RTO: 14.5 % (ref 12.4–15.4)
PDW BLD-RTO: 14.6 % (ref 12.4–15.4)
PDW BLD-RTO: 15.1 % (ref 12.4–15.4)
PDW BLD-RTO: 15.2 % (ref 12.4–15.4)
PDW BLD-RTO: 15.7 % (ref 12.4–15.4)
PERFORMED ON: ABNORMAL
PERFORMED ON: NORMAL
PH UA: 5.5 (ref 5–8)
PH UA: 6 (ref 5–8)
PHOSPHORUS: 3.5 MG/DL (ref 2.5–4.9)
PLATELET # BLD: 103 K/UL (ref 135–450)
PLATELET # BLD: 115 K/UL (ref 135–450)
PLATELET # BLD: 116 K/UL (ref 135–450)
PLATELET # BLD: 118 K/UL (ref 135–450)
PLATELET # BLD: 161 K/UL (ref 135–450)
PLATELET # BLD: 164 K/UL (ref 135–450)
PLATELET # BLD: 167 K/UL (ref 135–450)
PLATELET # BLD: 215 K/UL (ref 135–450)
PLATELET # BLD: 81 K/UL (ref 135–450)
PLATELET # BLD: 85 K/UL (ref 135–450)
PLATELET SLIDE REVIEW: ABNORMAL
PLT NEUTA: NEGATIVE
PMV BLD AUTO: 10.1 FL (ref 5–10.5)
PMV BLD AUTO: 8.1 FL (ref 5–10.5)
PMV BLD AUTO: 8.3 FL (ref 5–10.5)
PMV BLD AUTO: 8.8 FL (ref 5–10.5)
PMV BLD AUTO: 9.3 FL (ref 5–10.5)
PMV BLD AUTO: 9.7 FL (ref 5–10.5)
PMV BLD AUTO: 9.9 FL (ref 5–10.5)
POTASSIUM REFLEX MAGNESIUM: 3.1 MMOL/L (ref 3.5–5.1)
POTASSIUM REFLEX MAGNESIUM: 3.2 MMOL/L (ref 3.5–5.1)
POTASSIUM REFLEX MAGNESIUM: 3.3 MMOL/L (ref 3.5–5.1)
POTASSIUM REFLEX MAGNESIUM: 3.5 MMOL/L (ref 3.5–5.1)
POTASSIUM REFLEX MAGNESIUM: 3.7 MMOL/L (ref 3.5–5.1)
POTASSIUM REFLEX MAGNESIUM: 3.8 MMOL/L (ref 3.5–5.1)
POTASSIUM REFLEX MAGNESIUM: 3.8 MMOL/L (ref 3.5–5.1)
POTASSIUM REFLEX MAGNESIUM: 4 MMOL/L (ref 3.5–5.1)
POTASSIUM REFLEX MAGNESIUM: 4 MMOL/L (ref 3.5–5.1)
POTASSIUM REFLEX MAGNESIUM: 4.2 MMOL/L (ref 3.5–5.1)
POTASSIUM REFLEX MAGNESIUM: 4.4 MMOL/L (ref 3.5–5.1)
POTASSIUM SERPL-SCNC: 4 MMOL/L (ref 3.5–5.1)
POTASSIUM SERPL-SCNC: 4.3 MMOL/L (ref 3.5–5.1)
POTASSIUM SERPL-SCNC: 4.3 MMOL/L (ref 3.5–5.1)
PRO-BNP: 4597 PG/ML (ref 0–124)
PROSTATE SPECIFIC ANTIGEN: 0.06 NG/ML (ref 0–4)
PROTEIN UA: NEGATIVE MG/DL
PROTEIN UA: NEGATIVE MG/DL
PROTHROMBIN TIME: 15.8 SEC (ref 9.9–12.7)
PROTHROMBIN TIME: 154.8 SEC (ref 9.9–12.7)
PROTHROMBIN TIME: 16 SEC (ref 9.9–12.7)
PROTHROMBIN TIME: 166.4 SEC (ref 9.9–12.7)
PROTHROMBIN TIME: 17.1 SEC (ref 9.9–12.7)
PROTHROMBIN TIME: 17.2 SEC (ref 9.9–12.7)
PROTHROMBIN TIME: 17.5 SEC (ref 9.9–12.7)
PROTHROMBIN TIME: 21.4 SEC (ref 9.9–12.7)
PROTHROMBIN TIME: 22.5 SEC (ref 9.9–12.7)
PROTHROMBIN TIME: 22.7 SEC (ref 9.9–12.7)
PROTHROMBIN TIME: 23.5 SEC (ref 10–13.2)
PROTHROMBIN TIME: 28 SEC (ref 9.9–12.7)
PROTHROMBIN TIME: 31.8 SEC (ref 10–13.2)
PROTHROMBIN TIME: 34.1 SEC (ref 10–13.2)
PROTHROMBIN TIME: 38.9 SEC (ref 10–13.2)
PROTHROMBIN TIME: 48.8 SEC (ref 10–13.2)
PROTHROMBIN TIME: >170 SEC (ref 9.9–12.7)
PT 1:1 MIX IMMEDIATE: 14.1 SEC (ref 9.6–13)
PT D: 17.2 SEC (ref 12–15.5)
PT MIXING STUDY INDICATED?: YES
PTT D: 67 SEC (ref 32–48)
PTT-D CORR REFLEX: 55 SEC (ref 32–48)
PTT-HEPARIN NEUTRALIZED: ABNORMAL SEC (ref 32–48)
RBC # BLD: 3.28 M/UL (ref 4.2–5.9)
RBC # BLD: 3.29 M/UL (ref 4.2–5.9)
RBC # BLD: 3.72 M/UL (ref 4.2–5.9)
RBC # BLD: 3.85 M/UL (ref 4.2–5.9)
RBC # BLD: 3.96 M/UL (ref 4.2–5.9)
RBC # BLD: 3.96 M/UL (ref 4.2–5.9)
RBC # BLD: 4.07 M/UL (ref 4.2–5.9)
RBC # BLD: 4.09 M/UL (ref 4.2–5.9)
RBC # BLD: 4.09 M/UL (ref 4.2–5.9)
RBC # BLD: 4.16 M/UL (ref 4.2–5.9)
RBC UA: ABNORMAL /HPF (ref 0–4)
REPTILASE TIME: ABNORMAL SEC
RETICULOCYTE ABSOLUTE COUNT: 0.01 M/UL
RETICULOCYTE COUNT PCT: 0.4 % (ref 0.5–2.18)
RHEUMATOID FACTOR: 15 IU/ML
S65C HEMOCHROMATOSIS MUT: NEGATIVE
SARS-COV-2, NAAT: NOT DETECTED
SARS-COV-2: NOT DETECTED
SEDIMENTATION RATE, ERYTHROCYTE: 11 MM/HR (ref 0–20)
SEDIMENTATION RATE, ERYTHROCYTE: 48 MM/HR (ref 0–20)
SLIDE REVIEW: ABNORMAL
SODIUM BLD-SCNC: 127 MMOL/L (ref 136–145)
SODIUM BLD-SCNC: 129 MMOL/L (ref 136–145)
SODIUM BLD-SCNC: 130 MMOL/L (ref 136–145)
SODIUM BLD-SCNC: 130 MMOL/L (ref 136–145)
SODIUM BLD-SCNC: 132 MMOL/L (ref 136–145)
SODIUM BLD-SCNC: 133 MMOL/L (ref 136–145)
SODIUM BLD-SCNC: 135 MMOL/L (ref 136–145)
SODIUM BLD-SCNC: 136 MMOL/L (ref 136–145)
SODIUM BLD-SCNC: 138 MMOL/L (ref 136–145)
SODIUM BLD-SCNC: 138 MMOL/L (ref 136–145)
SODIUM BLD-SCNC: 140 MMOL/L (ref 136–145)
SODIUM BLD-SCNC: 141 MMOL/L (ref 136–145)
SODIUM BLD-SCNC: 141 MMOL/L (ref 136–145)
SODIUM BLD-SCNC: 143 MMOL/L (ref 136–145)
SPE/IFE INTERPRETATION: NORMAL
SPECIFIC GRAVITY UA: 1.01 (ref 1–1.03)
SPECIFIC GRAVITY UA: 1.02 (ref 1–1.03)
THROMBIN TIME: 13.7 SECS (ref 12.1–17.5)
THROMBIN TIME: 18.3 SEC (ref 14.7–19.5)
TOTAL CK: 100 U/L (ref 39–308)
TOTAL IRON BINDING CAPACITY: 102 UG/DL (ref 260–445)
TOTAL PROTEIN: 4.4 G/DL (ref 6.4–8.2)
TOTAL PROTEIN: 4.7 G/DL (ref 6.4–8.2)
TOTAL PROTEIN: 4.7 G/DL (ref 6.4–8.2)
TOTAL PROTEIN: 4.8 G/DL (ref 6.4–8.2)
TOTAL PROTEIN: 4.8 G/DL (ref 6.4–8.2)
TOTAL PROTEIN: 5 G/DL (ref 6.4–8.2)
TOTAL PROTEIN: 5.1 G/DL (ref 6.4–8.2)
TOTAL PROTEIN: 5.4 G/DL (ref 6.4–8.2)
TOTAL PROTEIN: 5.5 G/DL (ref 6.4–8.2)
TOTAL PROTEIN: 5.7 G/DL (ref 6.4–8.2)
TOTAL PROTEIN: 5.9 G/DL (ref 6.4–8.2)
TOTAL PROTEIN: 6.2 G/DL (ref 6.4–8.2)
TRIGL SERPL-MCNC: 81 MG/DL (ref 0–150)
TROPONIN: <0.01 NG/ML
TSH REFLEX FT4: 1.46 UIU/ML (ref 0.27–4.2)
TSH SERPL DL<=0.05 MIU/L-ACNC: 1.69 UIU/ML (ref 0.27–4.2)
URINE REFLEX TO CULTURE: NORMAL
URINE TYPE: ABNORMAL
URINE TYPE: NORMAL
UROBILINOGEN, URINE: 0.2 E.U./DL
UROBILINOGEN, URINE: 0.2 E.U./DL
VITAMIN B-12: >2000 PG/ML (ref 211–911)
VLDLC SERPL CALC-MCNC: 16 MG/DL
WBC # BLD: 10.2 K/UL (ref 4–11)
WBC # BLD: 2.1 K/UL (ref 4–11)
WBC # BLD: 4 K/UL (ref 4–11)
WBC # BLD: 4.2 K/UL (ref 4–11)
WBC # BLD: 4.5 K/UL (ref 4–11)
WBC # BLD: 6.3 K/UL (ref 4–11)
WBC # BLD: 7.6 K/UL (ref 4–11)
WBC # BLD: 7.8 K/UL (ref 4–11)
WBC # BLD: 8.8 K/UL (ref 4–11)
WBC # BLD: 9.2 K/UL (ref 4–11)
WBC UA: ABNORMAL /HPF (ref 0–5)

## 2021-01-01 PROCEDURE — 97530 THERAPEUTIC ACTIVITIES: CPT

## 2021-01-01 PROCEDURE — 85610 PROTHROMBIN TIME: CPT

## 2021-01-01 PROCEDURE — 1200000000 HC SEMI PRIVATE

## 2021-01-01 PROCEDURE — 81256 HFE GENE: CPT

## 2021-01-01 PROCEDURE — 6360000002 HC RX W HCPCS: Performed by: PHYSICIAN ASSISTANT

## 2021-01-01 PROCEDURE — 3609012400 HC EGD TRANSORAL BIOPSY SINGLE/MULTIPLE: Performed by: INTERNAL MEDICINE

## 2021-01-01 PROCEDURE — 87641 MR-STAPH DNA AMP PROBE: CPT

## 2021-01-01 PROCEDURE — 36415 COLL VENOUS BLD VENIPUNCTURE: CPT

## 2021-01-01 PROCEDURE — 80048 BASIC METABOLIC PNL TOTAL CA: CPT

## 2021-01-01 PROCEDURE — 82705 FATS/LIPIDS FECES QUAL: CPT

## 2021-01-01 PROCEDURE — 88185 FLOWCYTOMETRY/TC ADD-ON: CPT

## 2021-01-01 PROCEDURE — 80053 COMPREHEN METABOLIC PANEL: CPT

## 2021-01-01 PROCEDURE — 6370000000 HC RX 637 (ALT 250 FOR IP): Performed by: NURSE PRACTITIONER

## 2021-01-01 PROCEDURE — 85730 THROMBOPLASTIN TIME PARTIAL: CPT

## 2021-01-01 PROCEDURE — 6370000000 HC RX 637 (ALT 250 FOR IP): Performed by: INTERNAL MEDICINE

## 2021-01-01 PROCEDURE — 87529 HSV DNA AMP PROBE: CPT

## 2021-01-01 PROCEDURE — 2580000003 HC RX 258: Performed by: INTERNAL MEDICINE

## 2021-01-01 PROCEDURE — 05H933Z INSERTION OF INFUSION DEVICE INTO RIGHT BRACHIAL VEIN, PERCUTANEOUS APPROACH: ICD-10-PCS | Performed by: STUDENT IN AN ORGANIZED HEALTH CARE EDUCATION/TRAINING PROGRAM

## 2021-01-01 PROCEDURE — 85670 THROMBIN TIME PLASMA: CPT

## 2021-01-01 PROCEDURE — 6370000000 HC RX 637 (ALT 250 FOR IP): Performed by: HOSPITALIST

## 2021-01-01 PROCEDURE — 96365 THER/PROPH/DIAG IV INF INIT: CPT

## 2021-01-01 PROCEDURE — 86663 EPSTEIN-BARR ANTIBODY: CPT

## 2021-01-01 PROCEDURE — 83010 ASSAY OF HAPTOGLOBIN QUANT: CPT

## 2021-01-01 PROCEDURE — 71260 CT THORAX DX C+: CPT

## 2021-01-01 PROCEDURE — 6370000000 HC RX 637 (ALT 250 FOR IP): Performed by: PHYSICIAN ASSISTANT

## 2021-01-01 PROCEDURE — 2709999900 HC NON-CHARGEABLE SUPPLY: Performed by: INTERNAL MEDICINE

## 2021-01-01 PROCEDURE — 6360000002 HC RX W HCPCS: Performed by: STUDENT IN AN ORGANIZED HEALTH CARE EDUCATION/TRAINING PROGRAM

## 2021-01-01 PROCEDURE — 86431 RHEUMATOID FACTOR QUANT: CPT

## 2021-01-01 PROCEDURE — 2500000003 HC RX 250 WO HCPCS: Performed by: INTERNAL MEDICINE

## 2021-01-01 PROCEDURE — 99233 SBSQ HOSP IP/OBS HIGH 50: CPT | Performed by: INTERNAL MEDICINE

## 2021-01-01 PROCEDURE — 86701 HIV-1ANTIBODY: CPT

## 2021-01-01 PROCEDURE — 85027 COMPLETE CBC AUTOMATED: CPT

## 2021-01-01 PROCEDURE — 84484 ASSAY OF TROPONIN QUANT: CPT

## 2021-01-01 PROCEDURE — 6370000000 HC RX 637 (ALT 250 FOR IP): Performed by: UROLOGY

## 2021-01-01 PROCEDURE — 85025 COMPLETE CBC W/AUTO DIFF WBC: CPT

## 2021-01-01 PROCEDURE — 85652 RBC SED RATE AUTOMATED: CPT

## 2021-01-01 PROCEDURE — 77012 CT SCAN FOR NEEDLE BIOPSY: CPT

## 2021-01-01 PROCEDURE — 97110 THERAPEUTIC EXERCISES: CPT

## 2021-01-01 PROCEDURE — 85732 THROMBOPLASTIN TIME PARTIAL: CPT

## 2021-01-01 PROCEDURE — 86140 C-REACTIVE PROTEIN: CPT

## 2021-01-01 PROCEDURE — 3700000000 HC ANESTHESIA ATTENDED CARE: Performed by: INTERNAL MEDICINE

## 2021-01-01 PROCEDURE — 2580000003 HC RX 258: Performed by: ANESTHESIOLOGY

## 2021-01-01 PROCEDURE — 84165 PROTEIN E-PHORESIS SERUM: CPT

## 2021-01-01 PROCEDURE — 74177 CT ABD & PELVIS W/CONTRAST: CPT

## 2021-01-01 PROCEDURE — 83516 IMMUNOASSAY NONANTIBODY: CPT

## 2021-01-01 PROCEDURE — 99253 IP/OBS CNSLTJ NEW/EST LOW 45: CPT | Performed by: INTERNAL MEDICINE

## 2021-01-01 PROCEDURE — 6360000002 HC RX W HCPCS: Performed by: INTERNAL MEDICINE

## 2021-01-01 PROCEDURE — 93306 TTE W/DOPPLER COMPLETE: CPT

## 2021-01-01 PROCEDURE — 82784 ASSAY IGA/IGD/IGG/IGM EACH: CPT

## 2021-01-01 PROCEDURE — 88307 TISSUE EXAM BY PATHOLOGIST: CPT

## 2021-01-01 PROCEDURE — 0DB78ZX EXCISION OF STOMACH, PYLORUS, VIA NATURAL OR ARTIFICIAL OPENING ENDOSCOPIC, DIAGNOSTIC: ICD-10-PCS | Performed by: INTERNAL MEDICINE

## 2021-01-01 PROCEDURE — 7100000010 HC PHASE II RECOVERY - FIRST 15 MIN: Performed by: INTERNAL MEDICINE

## 2021-01-01 PROCEDURE — 84100 ASSAY OF PHOSPHORUS: CPT

## 2021-01-01 PROCEDURE — 85230 CLOT FACTOR VII PROCONVERTIN: CPT

## 2021-01-01 PROCEDURE — 2500000003 HC RX 250 WO HCPCS: Performed by: NURSE ANESTHETIST, CERTIFIED REGISTERED

## 2021-01-01 PROCEDURE — 83735 ASSAY OF MAGNESIUM: CPT

## 2021-01-01 PROCEDURE — 86665 EPSTEIN-BARR CAPSID VCA: CPT

## 2021-01-01 PROCEDURE — 82140 ASSAY OF AMMONIA: CPT

## 2021-01-01 PROCEDURE — 51798 US URINE CAPACITY MEASURE: CPT

## 2021-01-01 PROCEDURE — 99232 SBSQ HOSP IP/OBS MODERATE 35: CPT | Performed by: INTERNAL MEDICINE

## 2021-01-01 PROCEDURE — 82728 ASSAY OF FERRITIN: CPT

## 2021-01-01 PROCEDURE — 7100000011 HC PHASE II RECOVERY - ADDTL 15 MIN: Performed by: INTERNAL MEDICINE

## 2021-01-01 PROCEDURE — 2700000000 HC OXYGEN THERAPY PER DAY

## 2021-01-01 PROCEDURE — 6370000000 HC RX 637 (ALT 250 FOR IP): Performed by: STUDENT IN AN ORGANIZED HEALTH CARE EDUCATION/TRAINING PROGRAM

## 2021-01-01 PROCEDURE — 81001 URINALYSIS AUTO W/SCOPE: CPT

## 2021-01-01 PROCEDURE — 99254 IP/OBS CNSLTJ NEW/EST MOD 60: CPT | Performed by: INTERNAL MEDICINE

## 2021-01-01 PROCEDURE — 93970 EXTREMITY STUDY: CPT

## 2021-01-01 PROCEDURE — 0FB03ZX EXCISION OF LIVER, PERCUTANEOUS APPROACH, DIAGNOSTIC: ICD-10-PCS | Performed by: RADIOLOGY

## 2021-01-01 PROCEDURE — 84443 ASSAY THYROID STIM HORMONE: CPT

## 2021-01-01 PROCEDURE — U0003 INFECTIOUS AGENT DETECTION BY NUCLEIC ACID (DNA OR RNA); SEVERE ACUTE RESPIRATORY SYNDROME CORONAVIRUS 2 (SARS-COV-2) (CORONAVIRUS DISEASE [COVID-19]), AMPLIFIED PROBE TECHNIQUE, MAKING USE OF HIGH THROUGHPUT TECHNOLOGIES AS DESCRIBED BY CMS-2020-01-R: HCPCS

## 2021-01-01 PROCEDURE — 80074 ACUTE HEPATITIS PANEL: CPT

## 2021-01-01 PROCEDURE — 82533 TOTAL CORTISOL: CPT

## 2021-01-01 PROCEDURE — 88305 TISSUE EXAM BY PATHOLOGIST: CPT

## 2021-01-01 PROCEDURE — 87635 SARS-COV-2 COVID-19 AMP PRB: CPT

## 2021-01-01 PROCEDURE — C9113 INJ PANTOPRAZOLE SODIUM, VIA: HCPCS | Performed by: PHYSICIAN ASSISTANT

## 2021-01-01 PROCEDURE — 93005 ELECTROCARDIOGRAM TRACING: CPT | Performed by: STUDENT IN AN ORGANIZED HEALTH CARE EDUCATION/TRAINING PROGRAM

## 2021-01-01 PROCEDURE — 88184 FLOWCYTOMETRY/ TC 1 MARKER: CPT

## 2021-01-01 PROCEDURE — 83615 LACTATE (LD) (LDH) ENZYME: CPT

## 2021-01-01 PROCEDURE — 94761 N-INVAS EAR/PLS OXIMETRY MLT: CPT

## 2021-01-01 PROCEDURE — 6360000004 HC RX CONTRAST MEDICATION: Performed by: FAMILY MEDICINE

## 2021-01-01 PROCEDURE — 70450 CT HEAD/BRAIN W/O DYE: CPT

## 2021-01-01 PROCEDURE — 88342 IMHCHEM/IMCYTCHM 1ST ANTB: CPT

## 2021-01-01 PROCEDURE — 80061 LIPID PANEL: CPT

## 2021-01-01 PROCEDURE — 97166 OT EVAL MOD COMPLEX 45 MIN: CPT

## 2021-01-01 PROCEDURE — 86702 HIV-2 ANTIBODY: CPT

## 2021-01-01 PROCEDURE — 87040 BLOOD CULTURE FOR BACTERIA: CPT

## 2021-01-01 PROCEDURE — 86664 EPSTEIN-BARR NUCLEAR ANTIGEN: CPT

## 2021-01-01 PROCEDURE — 96368 THER/DIAG CONCURRENT INF: CPT

## 2021-01-01 PROCEDURE — 82270 OCCULT BLOOD FECES: CPT

## 2021-01-01 PROCEDURE — 99223 1ST HOSP IP/OBS HIGH 75: CPT | Performed by: INTERNAL MEDICINE

## 2021-01-01 PROCEDURE — 1250000000 HC SEMI PRIVATE HOSPICE R&B

## 2021-01-01 PROCEDURE — 82746 ASSAY OF FOLIC ACID SERUM: CPT

## 2021-01-01 PROCEDURE — 84153 ASSAY OF PSA TOTAL: CPT

## 2021-01-01 PROCEDURE — 72148 MRI LUMBAR SPINE W/O DYE: CPT

## 2021-01-01 PROCEDURE — 0DB58ZX EXCISION OF ESOPHAGUS, VIA NATURAL OR ARTIFICIAL OPENING ENDOSCOPIC, DIAGNOSTIC: ICD-10-PCS | Performed by: INTERNAL MEDICINE

## 2021-01-01 PROCEDURE — 72141 MRI NECK SPINE W/O DYE: CPT

## 2021-01-01 PROCEDURE — 83605 ASSAY OF LACTIC ACID: CPT

## 2021-01-01 PROCEDURE — 0DBL8ZX EXCISION OF TRANSVERSE COLON, VIA NATURAL OR ARTIFICIAL OPENING ENDOSCOPIC, DIAGNOSTIC: ICD-10-PCS | Performed by: INTERNAL MEDICINE

## 2021-01-01 PROCEDURE — 83540 ASSAY OF IRON: CPT

## 2021-01-01 PROCEDURE — 87390 HIV-1 AG IA: CPT

## 2021-01-01 PROCEDURE — 82085 ASSAY OF ALDOLASE: CPT

## 2021-01-01 PROCEDURE — 83550 IRON BINDING TEST: CPT

## 2021-01-01 PROCEDURE — 88313 SPECIAL STAINS GROUP 2: CPT

## 2021-01-01 PROCEDURE — 81003 URINALYSIS AUTO W/O SCOPE: CPT

## 2021-01-01 PROCEDURE — 83880 ASSAY OF NATRIURETIC PEPTIDE: CPT

## 2021-01-01 PROCEDURE — 99284 EMERGENCY DEPT VISIT MOD MDM: CPT

## 2021-01-01 PROCEDURE — 83036 HEMOGLOBIN GLYCOSYLATED A1C: CPT

## 2021-01-01 PROCEDURE — 2709999900 CT NEEDLE BIOPSY LIVER PERCUTANEOUS

## 2021-01-01 PROCEDURE — 6360000004 HC RX CONTRAST MEDICATION: Performed by: STUDENT IN AN ORGANIZED HEALTH CARE EDUCATION/TRAINING PROGRAM

## 2021-01-01 PROCEDURE — U0005 INFEC AGEN DETEC AMPLI PROBE: HCPCS

## 2021-01-01 PROCEDURE — 82607 VITAMIN B-12: CPT

## 2021-01-01 PROCEDURE — 82550 ASSAY OF CK (CPK): CPT

## 2021-01-01 PROCEDURE — 88341 IMHCHEM/IMCYTCHM EA ADD ANTB: CPT

## 2021-01-01 PROCEDURE — 97535 SELF CARE MNGMENT TRAINING: CPT

## 2021-01-01 PROCEDURE — 85611 PROTHROMBIN TEST: CPT

## 2021-01-01 PROCEDURE — 97162 PT EVAL MOD COMPLEX 30 MIN: CPT

## 2021-01-01 PROCEDURE — 96375 TX/PRO/DX INJ NEW DRUG ADDON: CPT

## 2021-01-01 PROCEDURE — 85598 HEXAGNAL PHOSPH PLTLT NEUTRL: CPT

## 2021-01-01 PROCEDURE — 2580000003 HC RX 258: Performed by: NURSE ANESTHETIST, CERTIFIED REGISTERED

## 2021-01-01 PROCEDURE — 82103 ALPHA-1-ANTITRYPSIN TOTAL: CPT

## 2021-01-01 PROCEDURE — 84155 ASSAY OF PROTEIN SERUM: CPT

## 2021-01-01 PROCEDURE — 82105 ALPHA-FETOPROTEIN SERUM: CPT

## 2021-01-01 PROCEDURE — 86038 ANTINUCLEAR ANTIBODIES: CPT

## 2021-01-01 PROCEDURE — 93010 ELECTROCARDIOGRAM REPORT: CPT | Performed by: INTERNAL MEDICINE

## 2021-01-01 PROCEDURE — 71045 X-RAY EXAM CHEST 1 VIEW: CPT

## 2021-01-01 PROCEDURE — 3609010600 HC COLONOSCOPY POLYPECTOMY SNARE/COLD BIOPSY: Performed by: INTERNAL MEDICINE

## 2021-01-01 PROCEDURE — 82104 ALPHA-1-ANTITRYPSIN PHENO: CPT

## 2021-01-01 PROCEDURE — 2580000003 HC RX 258: Performed by: STUDENT IN AN ORGANIZED HEALTH CARE EDUCATION/TRAINING PROGRAM

## 2021-01-01 PROCEDURE — 3700000001 HC ADD 15 MINUTES (ANESTHESIA): Performed by: INTERNAL MEDICINE

## 2021-01-01 PROCEDURE — 85613 RUSSELL VIPER VENOM DILUTED: CPT

## 2021-01-01 PROCEDURE — 76937 US GUIDE VASCULAR ACCESS: CPT

## 2021-01-01 PROCEDURE — 86645 CMV ANTIBODY IGM: CPT

## 2021-01-01 PROCEDURE — 6360000002 HC RX W HCPCS: Performed by: NURSE ANESTHETIST, CERTIFIED REGISTERED

## 2021-01-01 PROCEDURE — 36410 VNPNXR 3YR/> PHY/QHP DX/THER: CPT

## 2021-01-01 PROCEDURE — C1751 CATH, INF, PER/CENT/MIDLINE: HCPCS

## 2021-01-01 PROCEDURE — 85045 AUTOMATED RETICULOCYTE COUNT: CPT

## 2021-01-01 PROCEDURE — 85597 PHOSPHOLIPID PLTLT NEUTRALIZ: CPT

## 2021-01-01 PROCEDURE — 6360000002 HC RX W HCPCS: Performed by: RADIOLOGY

## 2021-01-01 PROCEDURE — 85384 FIBRINOGEN ACTIVITY: CPT

## 2021-01-01 RX ORDER — IPRATROPIUM BROMIDE AND ALBUTEROL SULFATE 2.5; .5 MG/3ML; MG/3ML
1 SOLUTION RESPIRATORY (INHALATION)
Status: DISCONTINUED | OUTPATIENT
Start: 2021-01-01 | End: 2021-01-01

## 2021-01-01 RX ORDER — DEXTROSE MONOHYDRATE 50 MG/ML
INJECTION, SOLUTION INTRAVENOUS CONTINUOUS
Status: DISCONTINUED | OUTPATIENT
Start: 2021-01-01 | End: 2021-01-01 | Stop reason: HOSPADM

## 2021-01-01 RX ORDER — POLYETHYLENE GLYCOL 3350 17 G/17G
120 POWDER, FOR SOLUTION ORAL ONCE
Status: COMPLETED | OUTPATIENT
Start: 2021-01-01 | End: 2021-01-01

## 2021-01-01 RX ORDER — DEXTROSE MONOHYDRATE 25 G/50ML
12.5 INJECTION, SOLUTION INTRAVENOUS PRN
Status: DISCONTINUED | OUTPATIENT
Start: 2021-01-01 | End: 2021-01-01 | Stop reason: SDUPTHER

## 2021-01-01 RX ORDER — POTASSIUM CHLORIDE 20 MEQ/1
40 TABLET, EXTENDED RELEASE ORAL ONCE
Status: COMPLETED | OUTPATIENT
Start: 2021-01-01 | End: 2021-01-01

## 2021-01-01 RX ORDER — PHYTONADIONE 5 MG/1
10 TABLET ORAL ONCE
Status: COMPLETED | OUTPATIENT
Start: 2021-01-01 | End: 2021-01-01

## 2021-01-01 RX ORDER — POLYETHYLENE GLYCOL 3350 17 G/17G
17 POWDER, FOR SOLUTION ORAL 2 TIMES DAILY
Status: DISCONTINUED | OUTPATIENT
Start: 2021-01-01 | End: 2021-01-01 | Stop reason: HOSPADM

## 2021-01-01 RX ORDER — OXYCODONE HYDROCHLORIDE AND ACETAMINOPHEN 5; 325 MG/1; MG/1
1 TABLET ORAL PRN
Status: ACTIVE | OUTPATIENT
Start: 2021-01-01 | End: 2021-01-01

## 2021-01-01 RX ORDER — MORPHINE SULFATE 2 MG/ML
2 INJECTION, SOLUTION INTRAMUSCULAR; INTRAVENOUS EVERY 5 MIN PRN
Status: DISCONTINUED | OUTPATIENT
Start: 2021-01-01 | End: 2021-01-01

## 2021-01-01 RX ORDER — DEXTROSE, SODIUM CHLORIDE, SODIUM LACTATE, POTASSIUM CHLORIDE, AND CALCIUM CHLORIDE 5; .6; .31; .03; .02 G/100ML; G/100ML; G/100ML; G/100ML; G/100ML
INJECTION, SOLUTION INTRAVENOUS CONTINUOUS
Status: DISCONTINUED | OUTPATIENT
Start: 2021-01-01 | End: 2021-01-01

## 2021-01-01 RX ORDER — ATORVASTATIN CALCIUM 10 MG/1
20 TABLET, FILM COATED ORAL NIGHTLY
Status: DISCONTINUED | OUTPATIENT
Start: 2021-01-01 | End: 2021-01-01

## 2021-01-01 RX ORDER — SODIUM CHLORIDE 0.9 % (FLUSH) 0.9 %
10 SYRINGE (ML) INJECTION PRN
Status: DISCONTINUED | OUTPATIENT
Start: 2021-01-01 | End: 2021-01-01 | Stop reason: HOSPADM

## 2021-01-01 RX ORDER — TAMSULOSIN HYDROCHLORIDE 0.4 MG/1
0.8 CAPSULE ORAL DAILY
Status: DISCONTINUED | OUTPATIENT
Start: 2021-01-01 | End: 2021-01-01 | Stop reason: HOSPADM

## 2021-01-01 RX ORDER — SODIUM CHLORIDE, SODIUM LACTATE, POTASSIUM CHLORIDE, CALCIUM CHLORIDE 600; 310; 30; 20 MG/100ML; MG/100ML; MG/100ML; MG/100ML
INJECTION, SOLUTION INTRAVENOUS CONTINUOUS
Status: DISCONTINUED | OUTPATIENT
Start: 2021-01-01 | End: 2021-01-01

## 2021-01-01 RX ORDER — SODIUM CHLORIDE, SODIUM LACTATE, POTASSIUM CHLORIDE, CALCIUM CHLORIDE 600; 310; 30; 20 MG/100ML; MG/100ML; MG/100ML; MG/100ML
INJECTION, SOLUTION INTRAVENOUS CONTINUOUS PRN
Status: DISCONTINUED | OUTPATIENT
Start: 2021-01-01 | End: 2021-01-01 | Stop reason: SDUPTHER

## 2021-01-01 RX ORDER — MORPHINE SULFATE 20 MG/ML
5 SOLUTION ORAL
Status: CANCELLED | OUTPATIENT
Start: 2021-01-01

## 2021-01-01 RX ORDER — LIDOCAINE HYDROCHLORIDE 20 MG/ML
INJECTION, SOLUTION INFILTRATION; PERINEURAL PRN
Status: DISCONTINUED | OUTPATIENT
Start: 2021-01-01 | End: 2021-01-01 | Stop reason: SDUPTHER

## 2021-01-01 RX ORDER — PHYTONADIONE 5 MG/1
5 TABLET ORAL ONCE
Status: COMPLETED | OUTPATIENT
Start: 2021-01-01 | End: 2021-01-01

## 2021-01-01 RX ORDER — MAGNESIUM SULFATE IN WATER 40 MG/ML
2000 INJECTION, SOLUTION INTRAVENOUS PRN
Status: DISCONTINUED | OUTPATIENT
Start: 2021-01-01 | End: 2021-01-01 | Stop reason: HOSPADM

## 2021-01-01 RX ORDER — POTASSIUM CHLORIDE 7.45 MG/ML
10 INJECTION INTRAVENOUS PRN
Status: DISCONTINUED | OUTPATIENT
Start: 2021-01-01 | End: 2021-01-01 | Stop reason: HOSPADM

## 2021-01-01 RX ORDER — NICOTINE POLACRILEX 4 MG
15 LOZENGE BUCCAL PRN
Status: DISCONTINUED | OUTPATIENT
Start: 2021-01-01 | End: 2021-01-01 | Stop reason: SDUPTHER

## 2021-01-01 RX ORDER — MORPHINE SULFATE 2 MG/ML
2 INJECTION, SOLUTION INTRAMUSCULAR; INTRAVENOUS
Status: CANCELLED | OUTPATIENT
Start: 2021-01-01

## 2021-01-01 RX ORDER — SODIUM CHLORIDE 0.9 % (FLUSH) 0.9 %
10 SYRINGE (ML) INJECTION EVERY 12 HOURS SCHEDULED
Status: DISCONTINUED | OUTPATIENT
Start: 2021-01-01 | End: 2021-01-01 | Stop reason: HOSPADM

## 2021-01-01 RX ORDER — LORAZEPAM 2 MG/ML
1 CONCENTRATE ORAL EVERY 4 HOURS PRN
Status: DISCONTINUED | OUTPATIENT
Start: 2021-01-01 | End: 2021-01-01 | Stop reason: HOSPADM

## 2021-01-01 RX ORDER — POLYETHYLENE GLYCOL 3350 17 G/17G
17 POWDER, FOR SOLUTION ORAL 2 TIMES DAILY
Qty: 527 G | Refills: 1 | DISCHARGE
Start: 2021-01-01 | End: 2021-10-18

## 2021-01-01 RX ORDER — SODIUM CHLORIDE 9 MG/ML
25 INJECTION, SOLUTION INTRAVENOUS PRN
Status: DISCONTINUED | OUTPATIENT
Start: 2021-01-01 | End: 2021-01-01 | Stop reason: HOSPADM

## 2021-01-01 RX ORDER — CASTOR OIL AND BALSAM, PERU 788; 87 MG/G; MG/G
OINTMENT TOPICAL 2 TIMES DAILY
Status: DISCONTINUED | OUTPATIENT
Start: 2021-01-01 | End: 2021-01-01 | Stop reason: HOSPADM

## 2021-01-01 RX ORDER — EPHEDRINE SULFATE 50 MG/ML
INJECTION INTRAVENOUS PRN
Status: DISCONTINUED | OUTPATIENT
Start: 2021-01-01 | End: 2021-01-01 | Stop reason: SDUPTHER

## 2021-01-01 RX ORDER — TAMSULOSIN HYDROCHLORIDE 0.4 MG/1
0.8 CAPSULE ORAL DAILY
DISCHARGE
Start: 2021-01-01

## 2021-01-01 RX ORDER — ACYCLOVIR 200 MG/1
400 CAPSULE ORAL 3 TIMES DAILY
Status: DISCONTINUED | OUTPATIENT
Start: 2021-01-01 | End: 2021-01-01 | Stop reason: HOSPADM

## 2021-01-01 RX ORDER — PROMETHAZINE HYDROCHLORIDE 25 MG/1
12.5 TABLET ORAL EVERY 6 HOURS PRN
Status: DISCONTINUED | OUTPATIENT
Start: 2021-01-01 | End: 2021-01-01 | Stop reason: HOSPADM

## 2021-01-01 RX ORDER — MORPHINE SULFATE 20 MG/ML
5 SOLUTION ORAL
Status: DISCONTINUED | OUTPATIENT
Start: 2021-01-01 | End: 2021-01-01 | Stop reason: HOSPADM

## 2021-01-01 RX ORDER — FUROSEMIDE 10 MG/ML
20 INJECTION INTRAMUSCULAR; INTRAVENOUS ONCE
Status: COMPLETED | OUTPATIENT
Start: 2021-01-01 | End: 2021-01-01

## 2021-01-01 RX ORDER — SENNA AND DOCUSATE SODIUM 50; 8.6 MG/1; MG/1
2 TABLET, FILM COATED ORAL DAILY PRN
DISCHARGE
Start: 2021-01-01

## 2021-01-01 RX ORDER — WARFARIN SODIUM 1 MG/1
1 TABLET ORAL DAILY
Status: DISCONTINUED | OUTPATIENT
Start: 2021-01-01 | End: 2021-01-01

## 2021-01-01 RX ORDER — MEPERIDINE HYDROCHLORIDE 25 MG/ML
12.5 INJECTION INTRAMUSCULAR; INTRAVENOUS; SUBCUTANEOUS EVERY 5 MIN PRN
Status: DISCONTINUED | OUTPATIENT
Start: 2021-01-01 | End: 2021-01-01

## 2021-01-01 RX ORDER — WARFARIN SODIUM 5 MG/1
10 TABLET ORAL
Status: COMPLETED | OUTPATIENT
Start: 2021-01-01 | End: 2021-01-01

## 2021-01-01 RX ORDER — LORAZEPAM 2 MG/ML
1 CONCENTRATE ORAL EVERY 4 HOURS PRN
Status: CANCELLED | OUTPATIENT
Start: 2021-01-01

## 2021-01-01 RX ORDER — DEXTROSE MONOHYDRATE 25 G/50ML
12.5 INJECTION, SOLUTION INTRAVENOUS PRN
Status: DISCONTINUED | OUTPATIENT
Start: 2021-01-01 | End: 2021-01-01 | Stop reason: HOSPADM

## 2021-01-01 RX ORDER — DOCUSATE SODIUM 100 MG/1
100 CAPSULE, LIQUID FILLED ORAL 2 TIMES DAILY
Status: DISCONTINUED | OUTPATIENT
Start: 2021-01-01 | End: 2021-01-01

## 2021-01-01 RX ORDER — MORPHINE SULFATE 2 MG/ML
2 INJECTION, SOLUTION INTRAMUSCULAR; INTRAVENOUS
Status: DISCONTINUED | OUTPATIENT
Start: 2021-01-01 | End: 2021-01-01 | Stop reason: HOSPADM

## 2021-01-01 RX ORDER — SODIUM CHLORIDE 0.9 % (FLUSH) 0.9 %
10 SYRINGE (ML) INJECTION PRN
Status: CANCELLED | OUTPATIENT
Start: 2021-01-01

## 2021-01-01 RX ORDER — MIDAZOLAM HYDROCHLORIDE 5 MG/ML
INJECTION INTRAMUSCULAR; INTRAVENOUS
Status: COMPLETED | OUTPATIENT
Start: 2021-01-01 | End: 2021-01-01

## 2021-01-01 RX ORDER — IPRATROPIUM BROMIDE AND ALBUTEROL SULFATE 2.5; .5 MG/3ML; MG/3ML
1 SOLUTION RESPIRATORY (INHALATION) EVERY 4 HOURS PRN
Status: DISCONTINUED | OUTPATIENT
Start: 2021-01-01 | End: 2021-01-01 | Stop reason: HOSPADM

## 2021-01-01 RX ORDER — FENTANYL CITRATE 50 UG/ML
INJECTION, SOLUTION INTRAMUSCULAR; INTRAVENOUS
Status: COMPLETED | OUTPATIENT
Start: 2021-01-01 | End: 2021-01-01

## 2021-01-01 RX ORDER — GUAIFENESIN 100 MG/5ML
400 SOLUTION ORAL 2 TIMES DAILY
Status: DISCONTINUED | OUTPATIENT
Start: 2021-01-01 | End: 2021-01-01 | Stop reason: HOSPADM

## 2021-01-01 RX ORDER — MORPHINE SULFATE 2 MG/ML
1 INJECTION, SOLUTION INTRAMUSCULAR; INTRAVENOUS EVERY 5 MIN PRN
Status: DISCONTINUED | OUTPATIENT
Start: 2021-01-01 | End: 2021-01-01

## 2021-01-01 RX ORDER — TAMSULOSIN HYDROCHLORIDE 0.4 MG/1
0.4 CAPSULE ORAL DAILY
Status: DISCONTINUED | OUTPATIENT
Start: 2021-01-01 | End: 2021-01-01

## 2021-01-01 RX ORDER — DEXTROSE MONOHYDRATE 50 MG/ML
100 INJECTION, SOLUTION INTRAVENOUS PRN
Status: DISCONTINUED | OUTPATIENT
Start: 2021-01-01 | End: 2021-01-01 | Stop reason: HOSPADM

## 2021-01-01 RX ORDER — DOCUSATE SODIUM 100 MG/1
200 CAPSULE, LIQUID FILLED ORAL 2 TIMES DAILY
Status: DISCONTINUED | OUTPATIENT
Start: 2021-01-01 | End: 2021-01-01 | Stop reason: HOSPADM

## 2021-01-01 RX ORDER — ACETAMINOPHEN 650 MG/1
650 SUPPOSITORY RECTAL EVERY 6 HOURS PRN
Status: DISCONTINUED | OUTPATIENT
Start: 2021-01-01 | End: 2021-01-01

## 2021-01-01 RX ORDER — POLYETHYLENE GLYCOL 3350 17 G/17G
17 POWDER, FOR SOLUTION ORAL DAILY
Status: DISCONTINUED | OUTPATIENT
Start: 2021-01-01 | End: 2021-01-01

## 2021-01-01 RX ORDER — PANTOPRAZOLE SODIUM 40 MG/10ML
40 INJECTION, POWDER, LYOPHILIZED, FOR SOLUTION INTRAVENOUS 2 TIMES DAILY
Status: DISCONTINUED | OUTPATIENT
Start: 2021-01-01 | End: 2021-01-01

## 2021-01-01 RX ORDER — SENNA AND DOCUSATE SODIUM 50; 8.6 MG/1; MG/1
2 TABLET, FILM COATED ORAL DAILY PRN
Status: DISCONTINUED | OUTPATIENT
Start: 2021-01-01 | End: 2021-01-01 | Stop reason: HOSPADM

## 2021-01-01 RX ORDER — PANTOPRAZOLE SODIUM 40 MG/1
40 TABLET, DELAYED RELEASE ORAL
DISCHARGE
Start: 2021-01-01

## 2021-01-01 RX ORDER — ONDANSETRON 2 MG/ML
4 INJECTION INTRAMUSCULAR; INTRAVENOUS
Status: ACTIVE | OUTPATIENT
Start: 2021-01-01 | End: 2021-01-01

## 2021-01-01 RX ORDER — PROPOFOL 10 MG/ML
INJECTION, EMULSION INTRAVENOUS PRN
Status: DISCONTINUED | OUTPATIENT
Start: 2021-01-01 | End: 2021-01-01 | Stop reason: SDUPTHER

## 2021-01-01 RX ORDER — DOCUSATE SODIUM 100 MG/1
200 CAPSULE, LIQUID FILLED ORAL 2 TIMES DAILY
DISCHARGE
Start: 2021-01-01

## 2021-01-01 RX ORDER — ACETAMINOPHEN 325 MG/1
650 TABLET ORAL EVERY 6 HOURS PRN
Status: DISCONTINUED | OUTPATIENT
Start: 2021-01-01 | End: 2021-01-01

## 2021-01-01 RX ORDER — OXYCODONE HYDROCHLORIDE AND ACETAMINOPHEN 5; 325 MG/1; MG/1
2 TABLET ORAL PRN
Status: ACTIVE | OUTPATIENT
Start: 2021-01-01 | End: 2021-01-01

## 2021-01-01 RX ORDER — LORAZEPAM 2 MG/ML
1 INJECTION INTRAMUSCULAR EVERY 4 HOURS PRN
Status: DISCONTINUED | OUTPATIENT
Start: 2021-01-01 | End: 2021-01-01

## 2021-01-01 RX ORDER — SODIUM CHLORIDE 0.9 % (FLUSH) 0.9 %
10 SYRINGE (ML) INJECTION EVERY 12 HOURS SCHEDULED
Status: DISCONTINUED | OUTPATIENT
Start: 2021-01-01 | End: 2021-01-01 | Stop reason: SDUPTHER

## 2021-01-01 RX ORDER — ONDANSETRON 2 MG/ML
4 INJECTION INTRAMUSCULAR; INTRAVENOUS EVERY 6 HOURS PRN
Status: DISCONTINUED | OUTPATIENT
Start: 2021-01-01 | End: 2021-01-01 | Stop reason: HOSPADM

## 2021-01-01 RX ORDER — NICOTINE POLACRILEX 4 MG
15 LOZENGE BUCCAL PRN
Status: DISCONTINUED | OUTPATIENT
Start: 2021-01-01 | End: 2021-01-01 | Stop reason: HOSPADM

## 2021-01-01 RX ORDER — POLYETHYLENE GLYCOL 3350 17 G/17G
120 POWDER, FOR SOLUTION ORAL ONCE
Status: DISCONTINUED | OUTPATIENT
Start: 2021-01-01 | End: 2021-01-01

## 2021-01-01 RX ORDER — PANTOPRAZOLE SODIUM 40 MG/1
40 TABLET, DELAYED RELEASE ORAL
Status: DISCONTINUED | OUTPATIENT
Start: 2021-01-01 | End: 2021-01-01 | Stop reason: HOSPADM

## 2021-01-01 RX ORDER — BISACODYL 10 MG
10 SUPPOSITORY, RECTAL RECTAL ONCE
Status: DISCONTINUED | OUTPATIENT
Start: 2021-01-01 | End: 2021-01-01

## 2021-01-01 RX ADMIN — CEFTRIAXONE SODIUM 1000 MG: 1 INJECTION, POWDER, FOR SOLUTION INTRAMUSCULAR; INTRAVENOUS at 16:41

## 2021-01-01 RX ADMIN — GUAIFENESIN 400 MG: 100 SOLUTION ORAL at 08:51

## 2021-01-01 RX ADMIN — MAGNESIUM CITRATE 296 ML: 1.75 LIQUID ORAL at 14:44

## 2021-01-01 RX ADMIN — DOXYCYCLINE 100 MG: 100 INJECTION, POWDER, LYOPHILIZED, FOR SOLUTION INTRAVENOUS at 12:52

## 2021-01-01 RX ADMIN — SODIUM CHLORIDE, PRESERVATIVE FREE 10 ML: 5 INJECTION INTRAVENOUS at 12:32

## 2021-01-01 RX ADMIN — POLYETHYLENE GLYCOL (3350) 17 G: 17 POWDER, FOR SOLUTION ORAL at 10:43

## 2021-01-01 RX ADMIN — CEFTRIAXONE SODIUM 1000 MG: 1 INJECTION, POWDER, FOR SOLUTION INTRAMUSCULAR; INTRAVENOUS at 17:15

## 2021-01-01 RX ADMIN — GUAIFENESIN 400 MG: 100 SOLUTION ORAL at 10:43

## 2021-01-01 RX ADMIN — DOXYCYCLINE 100 MG: 100 INJECTION, POWDER, LYOPHILIZED, FOR SOLUTION INTRAVENOUS at 12:20

## 2021-01-01 RX ADMIN — Medication 5 MG: at 14:59

## 2021-01-01 RX ADMIN — DOCUSATE SODIUM 200 MG: 100 CAPSULE, LIQUID FILLED ORAL at 11:46

## 2021-01-01 RX ADMIN — DOCUSATE SODIUM 200 MG: 100 CAPSULE, LIQUID FILLED ORAL at 21:37

## 2021-01-01 RX ADMIN — Medication 1 MG: at 00:24

## 2021-01-01 RX ADMIN — POLYETHYLENE GLYCOL (3350) 17 G: 17 POWDER, FOR SOLUTION ORAL at 10:48

## 2021-01-01 RX ADMIN — GUAIFENESIN 400 MG: 100 SOLUTION ORAL at 22:29

## 2021-01-01 RX ADMIN — DEXTROSE MONOHYDRATE 12.5 G: 500 INJECTION PARENTERAL at 11:26

## 2021-01-01 RX ADMIN — DEXTROSE MONOHYDRATE 12.5 G: 500 INJECTION PARENTERAL at 05:48

## 2021-01-01 RX ADMIN — DOCUSATE SODIUM 200 MG: 100 CAPSULE, LIQUID FILLED ORAL at 22:29

## 2021-01-01 RX ADMIN — SODIUM CHLORIDE, PRESERVATIVE FREE 10 ML: 5 INJECTION INTRAVENOUS at 20:19

## 2021-01-01 RX ADMIN — CEFTRIAXONE SODIUM 1000 MG: 1 INJECTION, POWDER, FOR SOLUTION INTRAMUSCULAR; INTRAVENOUS at 17:21

## 2021-01-01 RX ADMIN — EPHEDRINE SULFATE 10 MG: 50 INJECTION INTRAVENOUS at 15:39

## 2021-01-01 RX ADMIN — Medication 15 G: at 08:55

## 2021-01-01 RX ADMIN — SODIUM CHLORIDE 25 ML: 9 INJECTION, SOLUTION INTRAVENOUS at 12:20

## 2021-01-01 RX ADMIN — SODIUM CHLORIDE, PRESERVATIVE FREE 10 ML: 5 INJECTION INTRAVENOUS at 08:51

## 2021-01-01 RX ADMIN — DOCUSATE SODIUM 200 MG: 100 CAPSULE, LIQUID FILLED ORAL at 21:29

## 2021-01-01 RX ADMIN — POLYETHYLENE GLYCOL (3350) 120 G: 17 POWDER, FOR SOLUTION ORAL at 03:40

## 2021-01-01 RX ADMIN — DEXTROSE MONOHYDRATE: 50 INJECTION, SOLUTION INTRAVENOUS at 00:29

## 2021-01-01 RX ADMIN — PHYTONADIONE 10 MG: 5 TABLET ORAL at 09:38

## 2021-01-01 RX ADMIN — DOXYCYCLINE 100 MG: 100 INJECTION, POWDER, LYOPHILIZED, FOR SOLUTION INTRAVENOUS at 12:08

## 2021-01-01 RX ADMIN — PANTOPRAZOLE SODIUM 40 MG: 40 INJECTION, POWDER, FOR SOLUTION INTRAVENOUS at 12:30

## 2021-01-01 RX ADMIN — DOCUSATE SODIUM 200 MG: 100 CAPSULE, LIQUID FILLED ORAL at 08:45

## 2021-01-01 RX ADMIN — DOCUSATE SODIUM 200 MG: 100 CAPSULE, LIQUID FILLED ORAL at 10:48

## 2021-01-01 RX ADMIN — SODIUM CHLORIDE, PRESERVATIVE FREE 10 ML: 5 INJECTION INTRAVENOUS at 10:49

## 2021-01-01 RX ADMIN — PANTOPRAZOLE SODIUM 40 MG: 40 INJECTION, POWDER, FOR SOLUTION INTRAVENOUS at 20:17

## 2021-01-01 RX ADMIN — DOXYCYCLINE 100 MG: 100 INJECTION, POWDER, LYOPHILIZED, FOR SOLUTION INTRAVENOUS at 11:41

## 2021-01-01 RX ADMIN — GUAIFENESIN 400 MG: 100 SOLUTION ORAL at 22:09

## 2021-01-01 RX ADMIN — DOXYCYCLINE 100 MG: 100 INJECTION, POWDER, LYOPHILIZED, FOR SOLUTION INTRAVENOUS at 03:27

## 2021-01-01 RX ADMIN — MIDAZOLAM HYDROCHLORIDE 1 MG: 5 INJECTION INTRAMUSCULAR; INTRAVENOUS at 14:10

## 2021-01-01 RX ADMIN — SODIUM CHLORIDE, PRESERVATIVE FREE 10 ML: 5 INJECTION INTRAVENOUS at 21:30

## 2021-01-01 RX ADMIN — SODIUM CHLORIDE, PRESERVATIVE FREE 10 ML: 5 INJECTION INTRAVENOUS at 12:33

## 2021-01-01 RX ADMIN — Medication 15 G: at 02:26

## 2021-01-01 RX ADMIN — TAMSULOSIN HYDROCHLORIDE 0.4 MG: 0.4 CAPSULE ORAL at 12:47

## 2021-01-01 RX ADMIN — LIDOCAINE HYDROCHLORIDE 60 MG: 20 INJECTION, SOLUTION INFILTRATION; PERINEURAL at 14:59

## 2021-01-01 RX ADMIN — GUAIFENESIN 400 MG: 100 SOLUTION ORAL at 22:20

## 2021-01-01 RX ADMIN — SODIUM CHLORIDE, PRESERVATIVE FREE 10 ML: 5 INJECTION INTRAVENOUS at 22:30

## 2021-01-01 RX ADMIN — SODIUM CHLORIDE, PRESERVATIVE FREE 10 ML: 5 INJECTION INTRAVENOUS at 20:28

## 2021-01-01 RX ADMIN — GLUCAGON HYDROCHLORIDE 1 MG: 1 INJECTION, POWDER, FOR SOLUTION INTRAMUSCULAR; INTRAVENOUS; SUBCUTANEOUS at 05:01

## 2021-01-01 RX ADMIN — PANTOPRAZOLE SODIUM 40 MG: 40 TABLET, DELAYED RELEASE ORAL at 17:03

## 2021-01-01 RX ADMIN — CASTOR OIL AND BALSAM, PERU: 788; 87 OINTMENT TOPICAL at 22:21

## 2021-01-01 RX ADMIN — TAMSULOSIN HYDROCHLORIDE 0.4 MG: 0.4 CAPSULE ORAL at 08:44

## 2021-01-01 RX ADMIN — DOXYCYCLINE 100 MG: 100 INJECTION, POWDER, LYOPHILIZED, FOR SOLUTION INTRAVENOUS at 12:41

## 2021-01-01 RX ADMIN — WARFARIN SODIUM 10 MG: 5 TABLET ORAL at 17:03

## 2021-01-01 RX ADMIN — SODIUM CHLORIDE, PRESERVATIVE FREE 10 ML: 5 INJECTION INTRAVENOUS at 21:36

## 2021-01-01 RX ADMIN — POLYETHYLENE GLYCOL (3350) 17 G: 17 POWDER, FOR SOLUTION ORAL at 21:30

## 2021-01-01 RX ADMIN — SODIUM CHLORIDE, PRESERVATIVE FREE 10 ML: 5 INJECTION INTRAVENOUS at 20:18

## 2021-01-01 RX ADMIN — PROPOFOL 100 MG: 10 INJECTION, EMULSION INTRAVENOUS at 14:59

## 2021-01-01 RX ADMIN — DEXTROSE MONOHYDRATE 500 MG: 50 INJECTION, SOLUTION INTRAVENOUS at 17:31

## 2021-01-01 RX ADMIN — DOXYCYCLINE 100 MG: 100 INJECTION, POWDER, LYOPHILIZED, FOR SOLUTION INTRAVENOUS at 23:33

## 2021-01-01 RX ADMIN — DOXYCYCLINE 100 MG: 100 INJECTION, POWDER, LYOPHILIZED, FOR SOLUTION INTRAVENOUS at 01:08

## 2021-01-01 RX ADMIN — PANTOPRAZOLE SODIUM 40 MG: 40 TABLET, DELAYED RELEASE ORAL at 15:46

## 2021-01-01 RX ADMIN — SODIUM CHLORIDE, PRESERVATIVE FREE 10 ML: 5 INJECTION INTRAVENOUS at 22:21

## 2021-01-01 RX ADMIN — DEXTROSE MONOHYDRATE 12.5 G: 500 INJECTION PARENTERAL at 16:11

## 2021-01-01 RX ADMIN — IOHEXOL 50 ML: 240 INJECTION, SOLUTION INTRATHECAL; INTRAVASCULAR; INTRAVENOUS; ORAL at 16:10

## 2021-01-01 RX ADMIN — GUAIFENESIN 400 MG: 100 SOLUTION ORAL at 12:30

## 2021-01-01 RX ADMIN — Medication 5 MG: at 11:28

## 2021-01-01 RX ADMIN — CASTOR OIL AND BALSAM, PERU: 788; 87 OINTMENT TOPICAL at 20:18

## 2021-01-01 RX ADMIN — DOXYCYCLINE 100 MG: 100 INJECTION, POWDER, LYOPHILIZED, FOR SOLUTION INTRAVENOUS at 00:15

## 2021-01-01 RX ADMIN — CEFTRIAXONE SODIUM 1000 MG: 1 INJECTION, POWDER, FOR SOLUTION INTRAMUSCULAR; INTRAVENOUS at 18:55

## 2021-01-01 RX ADMIN — EPHEDRINE SULFATE 10 MG: 50 INJECTION INTRAVENOUS at 15:26

## 2021-01-01 RX ADMIN — DEXTROSE MONOHYDRATE 100 ML/HR: 50 INJECTION, SOLUTION INTRAVENOUS at 16:40

## 2021-01-01 RX ADMIN — ACYCLOVIR 400 MG: 200 CAPSULE ORAL at 10:48

## 2021-01-01 RX ADMIN — DOXYCYCLINE 100 MG: 100 INJECTION, POWDER, LYOPHILIZED, FOR SOLUTION INTRAVENOUS at 13:55

## 2021-01-01 RX ADMIN — DOCUSATE SODIUM 200 MG: 100 CAPSULE, LIQUID FILLED ORAL at 12:47

## 2021-01-01 RX ADMIN — PANTOPRAZOLE SODIUM 40 MG: 40 INJECTION, POWDER, FOR SOLUTION INTRAVENOUS at 21:29

## 2021-01-01 RX ADMIN — DEXTROSE MONOHYDRATE 12.5 G: 500 INJECTION PARENTERAL at 15:48

## 2021-01-01 RX ADMIN — POLYETHYLENE GLYCOL (3350) 17 G: 17 POWDER, FOR SOLUTION ORAL at 08:45

## 2021-01-01 RX ADMIN — POLYETHYLENE GLYCOL (3350) 17 G: 17 POWDER, FOR SOLUTION ORAL at 22:28

## 2021-01-01 RX ADMIN — CEFTRIAXONE SODIUM 1000 MG: 1 INJECTION, POWDER, FOR SOLUTION INTRAMUSCULAR; INTRAVENOUS at 17:31

## 2021-01-01 RX ADMIN — SODIUM CHLORIDE, PRESERVATIVE FREE 10 ML: 5 INJECTION INTRAVENOUS at 10:46

## 2021-01-01 RX ADMIN — PHYTONADIONE 5 MG: 5 TABLET ORAL at 12:35

## 2021-01-01 RX ADMIN — PANTOPRAZOLE SODIUM 40 MG: 40 INJECTION, POWDER, FOR SOLUTION INTRAVENOUS at 08:51

## 2021-01-01 RX ADMIN — TAMSULOSIN HYDROCHLORIDE 0.8 MG: 0.4 CAPSULE ORAL at 10:44

## 2021-01-01 RX ADMIN — POLYETHYLENE GLYCOL (3350) 17 G: 17 POWDER, FOR SOLUTION ORAL at 08:51

## 2021-01-01 RX ADMIN — DOXYCYCLINE 100 MG: 100 INJECTION, POWDER, LYOPHILIZED, FOR SOLUTION INTRAVENOUS at 00:34

## 2021-01-01 RX ADMIN — POLYETHYLENE GLYCOL (3350) 17 G: 17 POWDER, FOR SOLUTION ORAL at 22:29

## 2021-01-01 RX ADMIN — GUAIFENESIN 400 MG: 100 SOLUTION ORAL at 20:15

## 2021-01-01 RX ADMIN — Medication 15 G: at 20:25

## 2021-01-01 RX ADMIN — PANTOPRAZOLE SODIUM 40 MG: 40 TABLET, DELAYED RELEASE ORAL at 06:26

## 2021-01-01 RX ADMIN — TAMSULOSIN HYDROCHLORIDE 0.8 MG: 0.4 CAPSULE ORAL at 12:31

## 2021-01-01 RX ADMIN — DOCUSATE SODIUM 200 MG: 100 CAPSULE, LIQUID FILLED ORAL at 20:28

## 2021-01-01 RX ADMIN — DEXTROSE MONOHYDRATE 12.5 G: 500 INJECTION PARENTERAL at 12:06

## 2021-01-01 RX ADMIN — IOPAMIDOL 75 ML: 755 INJECTION, SOLUTION INTRAVENOUS at 16:05

## 2021-01-01 RX ADMIN — GUAIFENESIN 400 MG: 100 SOLUTION ORAL at 21:37

## 2021-01-01 RX ADMIN — SODIUM CHLORIDE 25 ML: 9 INJECTION, SOLUTION INTRAVENOUS at 18:21

## 2021-01-01 RX ADMIN — SODIUM CHLORIDE 25 ML: 9 INJECTION, SOLUTION INTRAVENOUS at 00:10

## 2021-01-01 RX ADMIN — POLYETHYLENE GLYCOL (3350) 17 G: 17 POWDER, FOR SOLUTION ORAL at 12:33

## 2021-01-01 RX ADMIN — Medication 15 G: at 12:09

## 2021-01-01 RX ADMIN — DOCUSATE SODIUM 200 MG: 100 CAPSULE, LIQUID FILLED ORAL at 20:14

## 2021-01-01 RX ADMIN — SODIUM CHLORIDE, PRESERVATIVE FREE 10 ML: 5 INJECTION INTRAVENOUS at 11:48

## 2021-01-01 RX ADMIN — POLYETHYLENE GLYCOL (3350) 17 G: 17 POWDER, FOR SOLUTION ORAL at 21:37

## 2021-01-01 RX ADMIN — DEXTROSE MONOHYDRATE 12.5 G: 500 INJECTION PARENTERAL at 04:51

## 2021-01-01 RX ADMIN — DOCUSATE SODIUM 200 MG: 100 CAPSULE, LIQUID FILLED ORAL at 10:44

## 2021-01-01 RX ADMIN — GUAIFENESIN 400 MG: 100 SOLUTION ORAL at 08:42

## 2021-01-01 RX ADMIN — SODIUM CHLORIDE, PRESERVATIVE FREE 10 ML: 5 INJECTION INTRAVENOUS at 12:47

## 2021-01-01 RX ADMIN — DOCUSATE SODIUM 200 MG: 100 CAPSULE, LIQUID FILLED ORAL at 08:51

## 2021-01-01 RX ADMIN — PROPOFOL 30 MG: 10 INJECTION, EMULSION INTRAVENOUS at 15:20

## 2021-01-01 RX ADMIN — FUROSEMIDE 20 MG: 10 INJECTION, SOLUTION INTRAMUSCULAR; INTRAVENOUS at 17:30

## 2021-01-01 RX ADMIN — POTASSIUM CHLORIDE 40 MEQ: 1500 TABLET, EXTENDED RELEASE ORAL at 12:21

## 2021-01-01 RX ADMIN — SODIUM CHLORIDE, PRESERVATIVE FREE 10 ML: 5 INJECTION INTRAVENOUS at 22:11

## 2021-01-01 RX ADMIN — TAMSULOSIN HYDROCHLORIDE 0.8 MG: 0.4 CAPSULE ORAL at 11:48

## 2021-01-01 RX ADMIN — SODIUM CHLORIDE, PRESERVATIVE FREE 10 ML: 5 INJECTION INTRAVENOUS at 08:43

## 2021-01-01 RX ADMIN — SODIUM CHLORIDE, PRESERVATIVE FREE 10 ML: 5 INJECTION INTRAVENOUS at 12:31

## 2021-01-01 RX ADMIN — Medication 5 MG: at 22:04

## 2021-01-01 RX ADMIN — PHYTONADIONE 5 MG: 5 TABLET ORAL at 17:30

## 2021-01-01 RX ADMIN — ACYCLOVIR 400 MG: 200 CAPSULE ORAL at 15:46

## 2021-01-01 RX ADMIN — POTASSIUM CHLORIDE 40 MEQ: 1500 TABLET, EXTENDED RELEASE ORAL at 16:03

## 2021-01-01 RX ADMIN — SODIUM CHLORIDE, POTASSIUM CHLORIDE, SODIUM LACTATE AND CALCIUM CHLORIDE: 600; 310; 30; 20 INJECTION, SOLUTION INTRAVENOUS at 14:59

## 2021-01-01 RX ADMIN — SODIUM CHLORIDE 25 ML: 9 INJECTION, SOLUTION INTRAVENOUS at 00:14

## 2021-01-01 RX ADMIN — TAMSULOSIN HYDROCHLORIDE 0.4 MG: 0.4 CAPSULE ORAL at 08:50

## 2021-01-01 RX ADMIN — TAMSULOSIN HYDROCHLORIDE 0.8 MG: 0.4 CAPSULE ORAL at 09:11

## 2021-01-01 RX ADMIN — PHYTONADIONE 5 MG: 5 TABLET ORAL at 07:18

## 2021-01-01 RX ADMIN — BISACODYL 20 MG: 5 TABLET, COATED ORAL at 13:41

## 2021-01-01 RX ADMIN — SODIUM CHLORIDE, PRESERVATIVE FREE 10 ML: 5 INJECTION INTRAVENOUS at 10:51

## 2021-01-01 RX ADMIN — GLUCAGON HYDROCHLORIDE 1 MG: 1 INJECTION, POWDER, FOR SOLUTION INTRAMUSCULAR; INTRAVENOUS; SUBCUTANEOUS at 09:01

## 2021-01-01 RX ADMIN — PANTOPRAZOLE SODIUM 40 MG: 40 TABLET, DELAYED RELEASE ORAL at 15:53

## 2021-01-01 RX ADMIN — GUAIFENESIN 400 MG: 100 SOLUTION ORAL at 10:49

## 2021-01-01 RX ADMIN — ACYCLOVIR 400 MG: 200 CAPSULE ORAL at 17:03

## 2021-01-01 RX ADMIN — SODIUM CHLORIDE, PRESERVATIVE FREE 10 ML: 5 INJECTION INTRAVENOUS at 09:38

## 2021-01-01 RX ADMIN — TAMSULOSIN HYDROCHLORIDE 0.8 MG: 0.4 CAPSULE ORAL at 08:51

## 2021-01-01 RX ADMIN — Medication 5 MG: at 08:33

## 2021-01-01 RX ADMIN — DEXTROSE MONOHYDRATE 12.5 G: 500 INJECTION PARENTERAL at 03:51

## 2021-01-01 RX ADMIN — SODIUM CHLORIDE 25 ML: 9 INJECTION, SOLUTION INTRAVENOUS at 17:12

## 2021-01-01 RX ADMIN — TAMSULOSIN HYDROCHLORIDE 0.8 MG: 0.4 CAPSULE ORAL at 10:48

## 2021-01-01 RX ADMIN — POLYETHYLENE GLYCOL (3350) 17 G: 17 POWDER, FOR SOLUTION ORAL at 11:52

## 2021-01-01 RX ADMIN — TAMSULOSIN HYDROCHLORIDE 0.4 MG: 0.4 CAPSULE ORAL at 22:29

## 2021-01-01 RX ADMIN — CASTOR OIL AND BALSAM, PERU: 788; 87 OINTMENT TOPICAL at 20:29

## 2021-01-01 RX ADMIN — GUAIFENESIN 400 MG: 100 SOLUTION ORAL at 22:10

## 2021-01-01 RX ADMIN — CEFTRIAXONE SODIUM 1000 MG: 1 INJECTION, POWDER, FOR SOLUTION INTRAMUSCULAR; INTRAVENOUS at 18:52

## 2021-01-01 RX ADMIN — DEXTROSE MONOHYDRATE 12.5 G: 500 INJECTION PARENTERAL at 16:31

## 2021-01-01 RX ADMIN — DOXYCYCLINE 100 MG: 100 INJECTION, POWDER, LYOPHILIZED, FOR SOLUTION INTRAVENOUS at 00:30

## 2021-01-01 RX ADMIN — DEXTROSE MONOHYDRATE: 50 INJECTION, SOLUTION INTRAVENOUS at 15:46

## 2021-01-01 RX ADMIN — EPHEDRINE SULFATE 10 MG: 50 INJECTION INTRAVENOUS at 15:16

## 2021-01-01 RX ADMIN — Medication 15 G: at 23:29

## 2021-01-01 RX ADMIN — DOXYCYCLINE 100 MG: 100 INJECTION, POWDER, LYOPHILIZED, FOR SOLUTION INTRAVENOUS at 12:46

## 2021-01-01 RX ADMIN — DOCUSATE SODIUM 200 MG: 100 CAPSULE, LIQUID FILLED ORAL at 12:31

## 2021-01-01 RX ADMIN — DOXYCYCLINE 100 MG: 100 INJECTION, POWDER, LYOPHILIZED, FOR SOLUTION INTRAVENOUS at 22:27

## 2021-01-01 RX ADMIN — GUAIFENESIN 400 MG: 100 SOLUTION ORAL at 22:28

## 2021-01-01 RX ADMIN — SODIUM CHLORIDE, PRESERVATIVE FREE 10 ML: 5 INJECTION INTRAVENOUS at 22:10

## 2021-01-01 RX ADMIN — PANTOPRAZOLE SODIUM 40 MG: 40 TABLET, DELAYED RELEASE ORAL at 16:02

## 2021-01-01 RX ADMIN — DOXYCYCLINE 100 MG: 100 INJECTION, POWDER, LYOPHILIZED, FOR SOLUTION INTRAVENOUS at 00:11

## 2021-01-01 RX ADMIN — POLYETHYLENE GLYCOL (3350) 17 G: 17 POWDER, FOR SOLUTION ORAL at 20:17

## 2021-01-01 RX ADMIN — GLUCAGON HYDROCHLORIDE 1 MG: 1 INJECTION, POWDER, FOR SOLUTION INTRAMUSCULAR; INTRAVENOUS; SUBCUTANEOUS at 07:37

## 2021-01-01 RX ADMIN — CASTOR OIL AND BALSAM, PERU: 788; 87 OINTMENT TOPICAL at 10:52

## 2021-01-01 RX ADMIN — GUAIFENESIN 400 MG: 100 SOLUTION ORAL at 11:50

## 2021-01-01 RX ADMIN — DEXTROSE MONOHYDRATE 12.5 G: 500 INJECTION PARENTERAL at 04:00

## 2021-01-01 RX ADMIN — POLYETHYLENE GLYCOL (3350) 17 G: 17 POWDER, FOR SOLUTION ORAL at 22:10

## 2021-01-01 RX ADMIN — IOPAMIDOL 70 ML: 755 INJECTION, SOLUTION INTRAVENOUS at 20:56

## 2021-01-01 RX ADMIN — DOCUSATE SODIUM 200 MG: 100 CAPSULE, LIQUID FILLED ORAL at 22:28

## 2021-01-01 RX ADMIN — DEXTROSE MONOHYDRATE 12.5 G: 500 INJECTION PARENTERAL at 03:12

## 2021-01-01 RX ADMIN — CEFTRIAXONE SODIUM 1000 MG: 1 INJECTION, POWDER, FOR SOLUTION INTRAMUSCULAR; INTRAVENOUS at 17:35

## 2021-01-01 RX ADMIN — CASTOR OIL AND BALSAM, PERU: 788; 87 OINTMENT TOPICAL at 09:39

## 2021-01-01 RX ADMIN — DOCUSATE SODIUM 200 MG: 100 CAPSULE, LIQUID FILLED ORAL at 22:10

## 2021-01-01 RX ADMIN — GUAIFENESIN 400 MG: 100 SOLUTION ORAL at 21:29

## 2021-01-01 RX ADMIN — CASTOR OIL AND BALSAM, PERU: 788; 87 OINTMENT TOPICAL at 13:19

## 2021-01-01 RX ADMIN — CEFTRIAXONE SODIUM 1000 MG: 1 INJECTION, POWDER, FOR SOLUTION INTRAMUSCULAR; INTRAVENOUS at 18:22

## 2021-01-01 RX ADMIN — GUAIFENESIN 400 MG: 100 SOLUTION ORAL at 08:56

## 2021-01-01 RX ADMIN — ACYCLOVIR 400 MG: 200 CAPSULE ORAL at 20:28

## 2021-01-01 RX ADMIN — SODIUM CHLORIDE, SODIUM LACTATE, POTASSIUM CHLORIDE, CALCIUM CHLORIDE AND DEXTROSE MONOHYDRATE: 5; 600; 310; 30; 20 INJECTION, SOLUTION INTRAVENOUS at 03:24

## 2021-01-01 RX ADMIN — DOXYCYCLINE 100 MG: 100 INJECTION, POWDER, LYOPHILIZED, FOR SOLUTION INTRAVENOUS at 12:00

## 2021-01-01 RX ADMIN — WARFARIN SODIUM 10 MG: 5 TABLET ORAL at 18:35

## 2021-01-01 RX ADMIN — SODIUM CHLORIDE, PRESERVATIVE FREE 10 ML: 5 INJECTION INTRAVENOUS at 10:45

## 2021-01-01 RX ADMIN — DEXTROSE MONOHYDRATE 12.5 G: 500 INJECTION PARENTERAL at 07:14

## 2021-01-01 RX ADMIN — FENTANYL CITRATE 25 MCG: 50 INJECTION, SOLUTION INTRAMUSCULAR; INTRAVENOUS at 14:10

## 2021-01-01 RX ADMIN — PHYTONADIONE 10 MG: 5 TABLET ORAL at 15:25

## 2021-01-01 RX ADMIN — POLYETHYLENE GLYCOL 3350 120 G: 17 POWDER, FOR SOLUTION ORAL at 18:00

## 2021-01-01 ASSESSMENT — ENCOUNTER SYMPTOMS
VOMITING: 0
NAUSEA: 0
PHOTOPHOBIA: 0
SHORTNESS OF BREATH: 1
SORE THROAT: 0
SHORTNESS OF BREATH: 0
ABDOMINAL PAIN: 0
BACK PAIN: 0
RHINORRHEA: 0
COUGH: 1

## 2021-01-01 ASSESSMENT — PAIN SCALES - GENERAL
PAINLEVEL_OUTOF10: 0
PAINLEVEL_OUTOF10: 7
PAINLEVEL_OUTOF10: 0
PAINLEVEL_OUTOF10: 2
PAINLEVEL_OUTOF10: 0

## 2021-01-01 ASSESSMENT — LIFESTYLE VARIABLES: SMOKING_STATUS: 0

## 2021-09-11 PROBLEM — R79.1 SUPRATHERAPEUTIC INR: Status: ACTIVE | Noted: 2021-01-01

## 2021-09-11 NOTE — ED PROVIDER NOTES
Take 1 tablet by mouth 2 times daily    TAMSULOSIN (FLOMAX) 0.4 MG CAPSULE    Take 0.4 mg by mouth    VERAPAMIL (CALAN-SR) 180 MG CR TABLET    Take 180 mg by mouth 2 times daily. WARFARIN (COUMADIN) 10 MG TABLET    Take 10 mg by mouth daily. ALLERGIES     Patient has no known allergies. FAMILY HISTORY       Family History   Problem Relation Age of Onset    Emphysema Father     Cancer Father           SOCIAL HISTORY       Social History     Socioeconomic History    Marital status:      Spouse name: Not on file    Number of children: Not on file    Years of education: Not on file    Highest education level: Not on file   Occupational History    Not on file   Tobacco Use    Smoking status: Former Smoker     Quit date: 3/1/2005     Years since quittin.5    Smokeless tobacco: Never Used   Substance and Sexual Activity    Alcohol use: No     Alcohol/week: 0.0 standard drinks    Drug use: No    Sexual activity: Yes     Partners: Female   Other Topics Concern    Not on file   Social History Narrative    Not on file     Social Determinants of Health     Financial Resource Strain:     Difficulty of Paying Living Expenses:    Food Insecurity:     Worried About Running Out of Food in the Last Year:     [address] in the Last Year:    Transportation Needs:     Lack of Transportation (Medical):      Lack of Transportation (Non-Medical):    Physical Activity:     Days of Exercise per Week:     Minutes of Exercise per Session:    Stress:     Feeling of Stress :    Social Connections:     Frequency of Communication with Friends and Family:     Frequency of Social Gatherings with Friends and Family:     Attends Congregational Services:     Active Member of Clubs or Organizations:     Attends Club or Organization Meetings:     Marital Status:    Intimate Partner Violence:     Fear of Current or Ex-Partner:     Emotionally Abused:     Physically Abused:     Sexually Abused: SCREENINGS                            REVIEW OF SYSTEMS    (2-9 systems for level 4, 10 or more for level 5)   Review of Systems   Constitutional: Positive for activity change, fatigue and unexpected weight change. Negative for chills and fever. HENT: Negative for congestion, rhinorrhea and sore throat. Eyes: Negative for photophobia and visual disturbance. Respiratory: Positive for cough. Negative for shortness of breath. Cardiovascular: Positive for leg swelling. Negative for chest pain and palpitations. Gastrointestinal: Negative for abdominal pain, nausea and vomiting. Genitourinary: Negative for decreased urine volume. Musculoskeletal: Negative for back pain, neck pain and neck stiffness. Skin: Negative for rash. Neurological: Negative for weakness, numbness and headaches. Psychiatric/Behavioral: Negative for confusion. PHYSICAL EXAM    (up to 7 for level 4, 8 or more for level 5)   RECENT VITALS:     Temp: 98.1 °F (36.7 °C),  Pulse: (!) 48, Resp: 18, BP: 120/68, SpO2: 98 %    Physical Exam  Constitutional:       Comments: Cachectic appearing   HENT:      Head: Normocephalic and atraumatic. Cardiovascular:      Rate and Rhythm: Bradycardia present. Rhythm irregular. Pulmonary:      Effort: Pulmonary effort is normal. No respiratory distress. Abdominal:      Tenderness: There is no abdominal tenderness. Musculoskeletal:      Cervical back: Normal range of motion. Right lower leg: Edema present. Left lower leg: Edema present. Skin:     General: Skin is warm. Neurological:      General: No focal deficit present. Mental Status: He is alert.          DIAGNOSTIC RESULTS     EKG: All EKG's are interpreted by the Emergency Department Physician who either signs or Co-signs this chart in the absence of a cardiologist.      The Ekg interpreted by me shows  atrial fibrillation with a rate of 56  Axis is   Normal  QTc is  normal  Intervals and Durations are unremarkable. ST Segments: nonspecific changes        RADIOLOGY:   Non-plain film images such as CT, Ultrasound and MRI are read by the radiologist. Plain radiographic images are visualized and preliminarily interpreted by the emergency physician. Interpretation per the Radiologist below, if available at the time of this note:    CT HEAD WO CONTRAST   Final Result   No acute traumatic intracranial abnormality      Atrophy and small-vessel ischemic change      Probable calcified meningioma on the right.          XR CHEST PORTABLE   Final Result   Left lower lobe pneumonia               LABS:  Labs Reviewed   CBC WITH AUTO DIFFERENTIAL - Abnormal; Notable for the following components:       Result Value    RBC 3.96 (*)     Hemoglobin 12.4 (*)     Hematocrit 37.9 (*)     Platelets 365 (*)     Lymphocytes Absolute 0.4 (*)     All other components within normal limits    Narrative:     Performed at:  [facility],  [employer] [facility]   Phone [phone]   COMPREHENSIVE METABOLIC PANEL W/ REFLEX TO MG FOR LOW K - Abnormal; Notable for the following components:    Glucose 60 (*)     BUN 53 (*)     CREATININE 0.7 (*)     Total Protein 6.2 (*)     Albumin 3.3 (*)     Alkaline Phosphatase 198 (*)      (*)      (*)     All other components within normal limits    Narrative:     Performed at:  [facility],  [employer] [facility]   Phone [phone]   BRAIN NATRIURETIC PEPTIDE - Abnormal; Notable for the following components:    Pro-BNP 4,597 (*)     All other components within normal limits    Narrative:     Performed at:  [facility],  [employer] [facility]   Phone [phone] - Abnormal; Notable for the following components:    Protime >170.0 (*)     INR >14.27 (*)     All other components within normal limits    Narrative:     CALL Marc RUVALCABA tel. [phone],  Coag results called to and read back by Lillian Bentley RN, 09/11/2021 16:54, by Bertha Pang  Performed at:  [facility] [address],  [place], [employer]   Phone [phone]   POCT GLUCOSE - Abnormal; Notable for the following components:    POC Glucose 45 (*)     All other components within normal limits    Narrative:     Performed at:  [facility] [address],  [place], [employer]   Phone [phone]   POCT GLUCOSE - Abnormal; Notable for the following components:    POC Glucose 61 (*)     All other components within normal limits    Narrative:     Performed at:  [facility] [address],  [place], [employer]   Phone [phone]   POCT GLUCOSE - Normal   COVID-19, RAPID    Narrative:     Performed at:  [facility] [address],  [place], [employer]   Phone [phone]   TROPONIN    Narrative:     Performed at:  [facility]) - [facility],  [place], [employer]   Phone [phone]       All other labs were within normal range or not returned as of this dictation. EMERGENCY DEPARTMENT COURSE and DIFFERENTIAL DIAGNOSIS/MDM:   Mihir Xie is a 80 y.o. male who presents to the emergency department with the complaint of patient is arriving complaining of increasing generalized weakness fatigue and inability to care for self had hypoglycemic event today to the 40s for family at home. Roughly 15 pound weight loss over the last 1 month patient is cachectic appearing. Family is also concerned due to cough and worsening leg swelling history of CHF. Recent MRI of spine shows abnormal signal in the spine currently has scheduled follow-up this coming Thursday with hematology oncology no focal neurologic deficit. No chest pain no shortness of breath no abdominal pain to palpation.   Patient is cachectic appearing, vitals otherwise. Chest x-ray demonstrating pneumonia, with new no cough, will treat for with ABX. Elevated proBNP around 5000 and, given Lasix IV does have significant lower extremity edema but not significantly demonstrated on x-ray. Patient not on oxygen. H&H stable. Mildly hypoglycemic, stable with oral intake    Patient significantly supratherapeutic with his INR at greater than 14, due to fall I do feel patient warrants CT imaging based on level of INR. CT was ordered and negative for intracranial acute findings. CRITICAL CARE TIME   Total Critical Care time was 30 minutes, excluding separately reportable procedures. There was a high probability of clinically significant/life threatening deterioration in the patient's condition which required my urgent intervention. Clinical concern supratherapeutic INR  Intervention history physical exam, chart review, lab interpretation, charting, discussion with admission services    CONSULTS:  IP CONSULT TO HOSPITALIST    PROCEDURES:  Unless otherwise noted below, none     Procedures        FINAL IMPRESSION      1. General weakness    2. Pneumonia due to infectious organism, unspecified laterality, unspecified part of lung    3. Supratherapeutic international normalized ratio (INR)    4. Elevated brain natriuretic peptide (BNP) level          DISPOSITION/PLAN   DISPOSITION  admit      PATIENT REFERRED TO:  No follow-up provider specified. DISCHARGE MEDICATIONS:  New Prescriptions    No medications on file     Controlled Substances Monitoring:     No flowsheet data found.     (Please note that portions of this note were completed with a voice recognition program.  Efforts were made to edit the dictations but occasionally words are mis-transcribed.)    Timmy Steele DO (electronically signed)  Attending Emergency Physician            Timmy Steele DO  09/11/21 [id number]

## 2021-09-11 NOTE — TELEPHONE ENCOUNTER
ED now    Care advice provided, patient verbalizes understanding; denies any other questions or concerns; instructed to call back for any new or worsening symptoms. Attention Provider: Thank you for allowing me to participate in the care of your patient. The patient was connected to triage in response to symptoms provided. Please do not respond through this encounter as the response is not directed to a shared pool.

## 2021-09-12 NOTE — PROGRESS NOTES
Covid swab and MRSA sent to lab, orders received per DR for reg. Diet. and for aguayo cath placement. Pt tolerated well with urine return.

## 2021-09-12 NOTE — CONSULTS
[address] ,  [address] [place] Roddy ALEGRIA [facility], [facility]  Phone: [phone] [facility],  [address] [facility], [address] [place] [address]  Phone: [phone]   [phone]    Gastroenterology H&P/Consult Note    No chief complaint on file. HPI     Thank you No ref. provider found and Moon Marcelo MD for asking me to see Agus Nesbitt in consultation. He is a 80y.o. year old malewith medical history of diabetes mellitus type 2, atrial fibrillation on coumadin, hypertension, hyperlipidemia, BELKYS on BiPAP, congestive heart failure presents with complaints of General weakness [R53.1]  Elevated brain natriuretic peptide (BNP) level [R79.89]  Supratherapeutic INR [R79.1]  Supratherapeutic international normalized ratio (INR) [R79.1]  Pneumonia due to infectious organism, unspecified laterality, unspecified part of lung [J18.9]. The documented principal problem is <principal problem not specified> and chief complaint is No chief complaint on file. .    Date of Admission:  2021  Date of Consultation:  2021    Patient is admitted for generalized weakness. Found to have supratherapeutic INR 14 and hypoglycemia. GI has been consulted for now unintentional weight loss after intentional weight loss from over 400 pounds. Denies abdominal pain, nausea, vomiting, fever, chills,  constipation, diarrhea, hematochezia or melenic stools. Labs reviewed with low WBC 2.1, hemoglobin 12.9, hematocrit 39.4, MCV 96.3 and platelets 049. Unremarkable BMP except low potassium 3.1, elevated BUN 51 and low glucose 42. Abnormal liver chemistries with elevated alkaline phosphatase 172, ,  and low albumin 2.7. Normal total bilirubin 0.7. CT abdomen and pelvis with IV contrast on 2021 noted multifocal pneumonia in background of emphysema and severe CAD. Left-sided inguinal hernia containing portions of the colon, without evidence of obstruction.       Patient and family report being referred to Dr. Elliott Palacios for colonoscopy. Last Encounter Reviewed: None  Pertinent PMH, FH, SH is reviewed below.   Last EGD: None  Last Colonoscopy: None    Health Maintenance   Topic Date Due    Diabetic foot exam  Never done    Diabetic retinal exam  Never done    Colon cancer screen colonoscopy  Never done    Shingles Vaccine (1 of 2) Never done    Diabetic microalbuminuria test  2014    A1C test (Diabetic or Prediabetic)  2019    Flu vaccine (1) 2021    Lipid screen  09/10/2021    Potassium monitoring  2022    Creatinine monitoring  2022    DTaP/Tdap/Td vaccine (2 - Td or Tdap) 2030    Pneumococcal 65+ years Vaccine  Completed    COVID-19 Vaccine  Completed    AAA screen  Completed    Hepatitis C screen  Completed    Hepatitis A vaccine  Aged Out    Hib vaccine  Aged Out    Meningococcal (ACWY) vaccine  Aged Out     PAST MEDICAL HISTORY     Past Medical History:   Diagnosis Date    Atrial fibrillation ([address]     CHF (congestive heart failure) ([address]     Diabetes mellitus ([address]     Hyperlipidemia     Hypertension     BELKYS treated with BiPAP      FAMILY HISTORY     Family History   Problem Relation Age of Onset    Emphysema Father     Cancer Father      SOCIAL HISTORY     Social History     Tobacco Use    Smoking status: Former Smoker     Quit date: 3/1/2005     Years since quittin.5    Smokeless tobacco: Never Used   Substance Use Topics    Alcohol use: No     Alcohol/week: 0.0 standard drinks    Drug use: No     SURGICAL HISTORY     Past Surgical History:   Procedure Laterality Date    CATARACT REMOVAL WITH IMPLANT Left 2018    EYE SURGERY  12/1/15    right eye cataract    HERNIA REPAIR       ALLERGIES   No Known Allergies  CURRENT MEDICATIONS      ipratropium-albuterol  1 ampule Inhalation Q4H WA    polyethylene glycol  17 g Oral Daily    atorvastatin  20 mg Oral Nightly    tamsulosin  0.4 mg Oral Daily    docusate sodium  200 mg Oral BID    bisacodyl  10 mg Rectal Once    doxycycline (VIBRAMYCIN) IV  100 mg IntraVENous Q12H    cefTRIAXone (ROCEPHIN) IV  1,000 mg IntraVENous Q24H    sodium chloride flush  10 mL IntraVENous 2 times per day      dextrose      dextrose 5% in lactated ringers 100 mL/hr at 09/12/21 0324    sodium chloride      dextrose       glucose, dextrose, glucagon (rDNA), dextrose, sennosides-docusate sodium, sodium chloride flush, sodium chloride, potassium chloride, magnesium sulfate, promethazine **OR** ondansetron, acetaminophen **OR** acetaminophen, dextrose  HOME MEDICATIONS  [unfilled]  IMMUNIZATIONS     Immunization History   Administered Date(s) Administered    COVID-19, Moderna, PF, 100mcg/0.5mL 01/28/2021, 02/25/2021    Influenza Virus Vaccine 10/01/2017     REVIEW OF SYSTEMS   See HPI for further details and pertinent postiives. Negative for the following:  Constitutional: Negative for weight change. Negative for appetite change and fatigue. HENT: Negative for nosebleeds, sore throat, mouth sores, trouble swallowing and voice change. Respiratory: Negative for cough, choking and chest tightness. Cardiovascular: Negative for chest pain   Gastrointestinal: See HPI  Musculoskeletal: Negative for arthralgias. Skin: Negative for pallor. Neurological: Negative for weakness and light-headedness. Hematological: Negative for adenopathy. Does not bruise/bleed easily. Psychiatric/Behavioral: Negative for suicidal ideas. PHYSICAL EXAM   VITAL SIGNS: /78   Pulse 62   Temp 98.1 °F (36.7 °C) (Oral)   Resp 16   SpO2 92%   With regards to weight, he reports lost 150 lbs over 5 years. Review of available records reveals:   Wt Readings from Last 50 Encounters:   07/11/18 171 lb (77.6 kg)   05/19/18 174 lb (78.9 kg)   03/01/18 177 lb (80.3 kg)   07/05/17 204 lb (92.5 kg)   05/20/17 207 lb (93.9 kg)   03/09/16 (!) 315 lb 9.6 oz (143.2 kg)   02/11/16 (!) 330 lb (149.7 kg) 02/03/16 (!) 331 lb (150.1 kg)   12/18/15 (!) 374 lb (169.6 kg)   12/13/15 (!) 385 lb 1.6 oz (174.7 kg)   11/24/15 (!) 369 lb (167.4 kg)     Constitutional: Cachexia. Ill appearing. No acute distress, Non-toxic appearance. HENT: Normocephalic, Atraumatic, Bilateral external ears normal, Oropharynx moist, No oral exudates, Nose normal.   Eyes: Conjunctiva normal, No discharge. Neck: Normal range of motion, No tenderness, Supple,  Cardiovascular: irregular rhythm, No murmurs, No rubs,  Thorax & Lungs: Normal breath sounds, No respiratory distress, No wheezing  Abdomen: excess skin folds, normal bowel sounds, soft, non tender, non distended,no hernias  Rectal:  Deferred. Skin: Warm, Dry, No erythema, No rash. No bruising. Lower Extremities: Intact distal pulses, No edema, No tenderness  Neurologic: Alert & oriented x 3    RADIOLOGY/PROCEDURES     Last PALAT CXR: Results for orders placed during the hospital encounter of 12/09/15    XR Chest Standard TWO VW    Narrative  EXAMINATION:  TWO VIEWS OF THE CHEST    12/9/2015 12:41 pm    COMPARISON:  None. HISTORY:  sob    Low oxygen saturation. Acute symptoms    FINDINGS:  There is diffuse pulmonary venous congestion, with interstitial opacity. No  focal consolidation. A trace amount of pleural fluid may be present although  no significant drainable pleural effusion. There is enlargement of the  cardiopericardial silhouette most likely due to chamber enlargement, less  likely pericardial effusion. Impression  Enlargement of the cardiopericardial silhouette as discussed, and pulmonary  venous congestion with interstitial opacity. Correlate for the possibility  of CHF and developing interstitial pulmonary edema. Last abd 4 way: No results found for this or any previous visit.     Last CT abd pelvis w contrast: Results for orders placed during the hospital encounter of 09/03/21    CT ABDOMEN PELVIS W IV CONTRAST Additional Contrast? Oral    Narrative  EXAMINATION:  CT OF THE ABDOMEN AND PELVIS WITH CONTRAST 9/3/2021 3:53 pm    TECHNIQUE:  CT of the abdomen and pelvis was performed with the administration of  intravenous contrast. Multiplanar reformatted images are provided for review. Dose modulation, iterative reconstruction, and/or weight based adjustment of  the mA/kV was utilized to reduce the radiation dose to as low as reasonably  achievable. COMPARISON:  11/19/2018    HISTORY:  ORDERING SYSTEM PROVIDED HISTORY: Weight loss  TECHNOLOGIST PROVIDED HISTORY:  Additional Contrast?->Oral  Reason for Exam: LOWER EXTREMITY WEAKNESS AND SWELLING, WEIGHLOSS/ FATIGUE  Acuity: Chronic  Type of Exam: Initial    FINDINGS:  The study is limited by motion artifact as well as overall decreased fat  within the soft tissues. Lower Chest: Motion limited imaging of the lung bases are grossly clear. No free air is noted within the abdomen or pelvis. Organs: Limited imaging of the liver is grossly unremarkable in appearance. The gallbladder is poorly visualized. Small low-attenuation foci within right kidney too small to characterize  likely represent cysts. Similar findings are noted on left. Left adrenal gland is unremarkable. Right adrenal gland is unable to be visualized. The pancreas and spleen appear grossly unremarkable. GI/Bowel: Evaluation of the stomach and GI tract is limited by motion as well  as the lack of intraperitoneal fat. There is a large left inguinal hernia containing portions of the colon. No  evidence obstruction noted. There is a large amount of stool within the rectal vault. Evaluation of the mesentery is significantly limited by the lack of  intraperitoneal fat. No significant adenopathy is appreciated. Pelvis: Pelvic structures are limited. Prostate appears enlarged. Urinary bladder appears grossly unremarkable. Large left inguinal hernia noted. There is edema of the scrotum. Calcification in what appears to be the right  testicle noted. Peritoneum/Retroperitoneum: Atherosclerotic changes are noted of the aorta  which appears to be normal in caliber. No significant retroperitoneal  adenopathy noted. Bones/Soft Tissues: There is severe diffuse acute cachexia. The skeletal system demonstrates numerous osteopenic areas. Findings are  similar but more prominent than seen on comparison study. Degenerative  changes and scoliotic curvature of the spine noted. Indeterminate 1.9 x 1.3 cm ovoid area with central soft tissue density with a  rim of enhancement. Impression  This is a significantly limited study secondary to both motion and the lack  of intraperitoneal fat. Patient is severely cachectic. There is a large left-sided inguinal hernia containing portions of the colon. There is no evidence of obstruction. Large amount of stool is noted within the rectal vault. Please correlate for  impaction. Indeterminate ovoid area within the right flank just anterior the iliac bone  and just below the liver. Finding is nonspecific and may represent a  structure within the abdominal wall. Area of peripheral enhancement versus  increased density is noted. Underlying soft tissue nodule is not excluded. Please correlate to clinical exam.  Additional imaging can be obtained as  clinically indicated. Calcification of what appears to be the right testicle. Scrotal ultrasound could be considered to further evaluate as clinically  indicated.       COURSE & MEDICAL DECISION MAKING   (See epic chart for additional details including stool tests, total bilirubin, viral loads, procedures, and pathology)  Lab Results   Component Value Date    WBC 2.1 (L) 09/12/2021    HGB 12.9 (L) 09/12/2021    HCT 39.4 (L) 09/12/2021     (L) 09/12/2021    CHOL 203 (H) 09/10/2020    TRIG 107 09/10/2020    HDL 78 (H) 09/10/2020     (H) 09/12/2021     (H) 09/12/2021     09/12/2021    K 3.1 (L) 09/12/2021     09/12/2021    CREATININE 0.6 (L) 09/12/2021    BUN 51 (H) 09/12/2021    CO2 26 09/12/2021    TSH 1.69 08/23/2021    PSA 0.06 08/23/2021    INR >14.27 (HH) 09/12/2021    GLUF 87 12/17/2018    LABA1C 5.3 05/19/2018    LABMICR Not Indicated 12/09/2015     Lab Results   Component Value Date    BILIDIR 0.1 06/23/2014     Lab Results   Component Value Date    PHOS 3.5 09/11/2021     Lab Results   Component Value Date    LABALBU 2.7 09/12/2021    ALKPHOS 172 09/12/2021     09/12/2021     09/12/2021    BILITOT 0.7 09/12/2021    BILIDIR 0.1 06/23/2014     Lab Results   Component Value Date    LIPASE 26.0 12/09/2015     No results found for: AMYLASE  Lab Results   Component Value Date    INR >14.27 (HH) 09/12/2021    INR >14.27 (HH) 09/11/2021    INR 2.71 (H) 06/17/2021    PROTIME >170.0 (H) 09/12/2021    PROTIME >170.0 (H) 09/11/2021    PROTIME 31.8 (H) 06/17/2021     No results found for: SMOOTHMUSCAB, MITOAB  Lab Results   Component Value Date    LINDY Negative 05/31/2019       Lab Results   Component Value Date    CERULOPLSM 22 05/31/2019     No components found for: C3BFHZUT  Lab Results   Component Value Date    IRON 64 05/31/2019    TIBC 280 05/31/2019     Lab Results   Component Value Date    SEDRATE 48 (H) 09/12/2021    SEDRATE 11 08/23/2021    SEDRATE 15 10/25/2018    SEDRATE 43 (H) 04/04/2017      Lab Results   Component Value Date    CRP 3.9 10/25/2018       . FINAL IMPRESSION/ASSESSMENT/PLAN   1. Generalized weakness likely due to multifocal pneumonia  2. Supratherapeutic INR > 14  3. Abnormal LFTs (mixed pattern) -- improving; likely due to above  4. Unintentional weight loss with cachexia likely due to emphysema; To rule out GI neoplasm    Plan:  Continue supportive care with IV fluids. Monitor LFTs daily  Continue management for pneumonia  Patient to benefit from elective EGD and colonoscopy to evaluate for neoplasm.     1.  The patient indicates understanding of these issues and agrees with the plan. 2.  I reviewed the patient's medical information and medical history. 3.  I have reviewed the past medical, family, and social history sections including the medications and allergies listed in the above medical record.           Jumana Garibay MD 9/12/21 2:09 PM EDT    [facility]   Phone [phone]   Fax [phone]

## 2021-09-12 NOTE — ED NOTES
Report given to floor RN at this time- denies further questions at this time.       Geoffrey Mahoney, SHAKILA  09/11/21 7812

## 2021-09-12 NOTE — PROGRESS NOTES
Hospitalist Progress Note      PCP: Cari Underwood MD    Date of Admission: 9/11/2021    Hospital Course: 78-year-old gentleman with history of fibrillation, chronic congestive heart failure diabetes type 2 history of smoking and alcohol use which is remote admitted here with bilateral hydronephrosis and possible pneumonia. Patient has been losing weight  Blood sugars been very low  Patient's daughter is Dr. Is concerned if patient has any malignancy. Subjective:   Laying in bed appears very cachectic  Has some chronic pain due to laying in bed patient does have diffuse edema      Medications:  Reviewed    Infusion Medications    dextrose      dextrose 5% in lactated ringers 100 mL/hr at 09/12/21 0324    sodium chloride      dextrose       Scheduled Medications    polyethylene glycol  17 g Oral Daily    phytonadione (VITAMIN K)  IVPB  10 mg IntraVENous Once    atorvastatin  20 mg Oral Nightly    tamsulosin  0.4 mg Oral Daily    docusate sodium  200 mg Oral BID    bisacodyl  10 mg Rectal Once    doxycycline (VIBRAMYCIN) IV  100 mg IntraVENous Q12H    cefTRIAXone (ROCEPHIN) IV  1,000 mg IntraVENous Q24H    sodium chloride flush  10 mL IntraVENous 2 times per day     PRN Meds: glucose, dextrose, glucagon (rDNA), dextrose, sennosides-docusate sodium, ipratropium-albuterol, sodium chloride flush, sodium chloride, potassium chloride, magnesium sulfate, promethazine **OR** ondansetron, acetaminophen **OR** acetaminophen, dextrose      Intake/Output Summary (Last 24 hours) at 9/12/2021 1645  Last data filed at 9/12/2021 1345  Gross per 24 hour   Intake --   Output 2400 ml   Net -2400 ml       Physical Exam Performed:    /78   Pulse 62   Temp 98.1 °F (36.7 °C) (Oral)   Resp 16   SpO2 92%     General appearance: Patient appears malnourished  HEENT: Pupils equal, round, and reactive to light. Conjunctivae/corneas clear. Neck: Supple, with full range of motion. No jugular venous distention. Trachea midline. Respiratory:  Normal respiratory effort. Clear to auscultation, bilaterally without Rales/Wheezes/Rhonchi. Cardiovascular: Irregularly irregular  Abdomen: Soft, non-tender, non-distended with normal bowel sounds. Musculoskeletal: No clubbing, cyanosis or +1 edema left lower extremity more than right . Full range of motion without deformity. Skin: Seen in bilateral upper extremities  Neurologic:  Neurovascularly intact without any focal sensory/motor deficits. Cranial nerves: II-XII intact, grossly non-focal.  Psychiatric: Alert and oriented    Labs:   Recent Labs     09/11/21  1535 09/12/21  0532   WBC 4.2 2.1*   HGB 12.4* 12.9*   HCT 37.9* 39.4*   * 115*     Recent Labs     09/11/21  1535 09/12/21  0532    143   K 4.2 3.1*    105   CO2 28 26   BUN 53* 51*   CREATININE 0.7* 0.6*   CALCIUM 9.1 8.7   PHOS 3.5  --      Recent Labs     09/11/21  1535 09/12/21  0532   * 103*   * 135*   BILITOT 0.8 0.7   ALKPHOS 198* 172*     Recent Labs     09/11/21  1535 09/12/21  0532   INR >14.27* >14.27*     Recent Labs     09/11/21  1535   TROPONINI <0.01       Urinalysis:      Lab Results   Component Value Date    NITRU Negative 09/12/2021    WBCUA 0-2 09/12/2021    RBCUA 11-20 09/12/2021    BLOODU SMALL 09/12/2021    SPECGRAV 1.015 09/12/2021    GLUCOSEU Negative 09/12/2021       Radiology:  CT CHEST ABDOMEN PELVIS W CONTRAST   Final Result   Multifocal pneumonia, greatest in the left lower lobe, new compared to   09/03/2021. There is a background emphysema and severe coronary artery   disease. Filling defects are seen in the left lower lobe, suggesting   sequelae from mucous plugging or aspiration. Bilateral hydronephrosis, likely secondary to markedly distended bladder. Left-sided inguinal hernia containing colon. No resultant bowel obstruction.          CT HEAD WO CONTRAST   Final Result   No acute traumatic intracranial abnormality      Atrophy and small-vessel ischemic change      Probable calcified meningioma on the right. XR CHEST PORTABLE   Final Result   Left lower lobe pneumonia                 Assessment/Plan:    Multifocal pneumonia patient has bronchiectasis history of COPD  IV antibiotic  Breathing treatment    Obstructive uropathy  We will get a Valentine catheter inserted  Urology consult pending  Flomax    Constipation  Starting on stool regimen    Supratherapeutic INR  We will give vitamin K intravenously      A. fib on chronic Coumadin  Been on verapamil   we will put a hold onto it. Hypoglycemia,   has lost a lot of weight  It is cortisol level in the morning    Chronic atrial fibrillation   bradycardia  We will get thyroid function      Severe malnutrition    Discussed with Allegra ( daughter )    DVT Prophylaxis:onwarf  Diet: ADULT DIET;  Regular  Code Status: Full Code    PT/OT Eval Status: p    Dispo - ms    Mohamud Chaves MD

## 2021-09-12 NOTE — PROGRESS NOTES
Consult has been called to Dr. Jovany Schroeder on 9/12/2021.  Left message on office answering machine @ 8:06 AM    Wilber Schulte  9/12/2021

## 2021-09-12 NOTE — PROGRESS NOTES
Consult has been called to Dr. Roddy Vega on 9/12/2021. Spoke with Lillian Ellis  4:57 PM    Suraj Merlos  9/12/2021

## 2021-09-12 NOTE — PROGRESS NOTES
RESPIRATORY THERAPY ASSESSMENT    Name:  Alida Record Number:  [id number]  Age: 80 y.o. Gender: male  : 1950  Today's Date:  2021  Room:  [address] [place] [address]    Assessment     Is the patient being admitted for a COPD or Asthma exacerbation? No   (If yes the patient will be seen every 4 hours for the first 24 hours and then reassessed)    Patient Admission Diagnosis      Allergies  No Known Allergies    Minimum Predicted Vital Capacity:     na          Actual Vital Capacity:      na              Pulmonary History:CHF/Pulmonary Edema  Home Oxygen Therapy:  room air  Home Respiratory Therapy:None   Current Respiratory Therapy:  Duoneb q4wa          Respiratory Severity Index(RSI)   Patients with orders for inhalation medications, oxygen, or any therapeutic treatment modality will be placed on Respiratory Protocol. They will be assessed with the first treatment and at least every 72 hours thereafter. The following severity scale will be used to determine frequency of treatment intervention.     Smoking History: Pulmonary Disease or Smoking History, Greater than 15 pack year = 2    Social History  Social History     Tobacco Use    Smoking status: Former Smoker     Quit date: 3/1/2005     Years since quittin.5    Smokeless tobacco: Never Used   Substance Use Topics    Alcohol use: No     Alcohol/week: 0.0 standard drinks    Drug use: No       Recent Surgical History: None = 0  Past Surgical History  Past Surgical History:   Procedure Laterality Date    CATARACT REMOVAL WITH IMPLANT Left 2018    EYE SURGERY  12/1/15    right eye cataract    HERNIA REPAIR         Level of Consciousness: Alert, Oriented, and Cooperative = 0    Level of Activity: Walking unassisted = 0    Respiratory Pattern: Regular Pattern; RR 8-20 = 0    Breath Sounds: Diminshed bilaterally and/or crackles = 2    Sputum   ,  ,    Cough: Strong, spontaneous, non-productive = 0    Vital Signs   /78 Pulse 62   Temp 98.1 °F (36.7 °C) (Oral)   Resp 16   SpO2 92%   SPO2 (COPD values may differ): Greater than or equal to 92% on room air = 0    Peak Flow (asthma only): not applicable = 0    RSI: 0-4 = See once and convert to home regimen or discontinue        Plan       Goals: medication delivery, mobilize retained secretions and improve oxygenation    Patient/caregiver was educated on the proper method of use for Respiratory Care Devices:  Yes      Level of patient/caregiver understanding able to:   ? Verbalize understanding   ? Demonstrate understanding       ? Teach back        ? Needs reinforcement       ? No available caregiver               ? Other:     Response to education:  Excellent     Is patient being placed on Home Treatment Regimen? No     Does the patient have everything they need prior to discharge? NA    Comments: chart reviewed, patient assessed    Plan of Care: duoneb q4prn per rsi, no hx of copd, no home meds    Electronically signed by Felix Hernandez RCP on 9/12/2021 at 3:54 PM    Respiratory Protocol Guidelines     1. Assessment and treatment by Respiratory Therapy will be initiated for medication and therapeutic interventions upon initiation of aerosolized medication. 2. Physician will be contacted for respiratory rate (RR) greater than 35 breaths per minute. Therapy will be held for heart rate (HR) greater than 140 beats per minute, pending direction from physician. 3. Bronchodilators will be administered via Metered Dose Inhaler (MDI) with spacer when the following criteria are met:  a. Alert and cooperative     b. HR < 140 bpm  c. RR < 30 bpm                d. Can demonstrate a 2-3 second inspiratory hold  4. Bronchodilators will be administered via Hand Held Nebulizer [facility] to patients when ANY of the following criteria are met  a. Incognizant or uncooperative          b. Patients treated with HHN at Home        c.  Unable to demonstrate proper use of MDI with spacer     d. RR > 30 bpm   5. Bronchodilators will be delivered via Metered Dose Inhaler (MDI), HHN, Aerogen to intubated patients on mechanical ventilation. 6. Inhalation medication orders will be delivered and/or substituted as outlined below. Aerosolized Medications Ordering and Administration Guidelines:    1. All Medications will be ordered by a physician, and their frequency and/or modality will be adjusted as defined by the patients Respiratory Severity Index (RSI) score. 2. If the patient does not have documented COPD, consider discontinuing anticholinergics when RSI is less than 9.  3. If the bronchospasm worsens (increased RSI), then the bronchodilator frequency can be increased to a maximum of every 4 hours. If greater than every 4 hours is required, the physician will be contacted. 4. If the bronchospasm improves, the frequency of the bronchodilator can be decreased, based on the patient's RSI, but not less than home treatment regimen frequency. 5. Bronchodilator(s) will be discontinued if patient has a RSI less than 9 and has received no scheduled or as needed treatment for 72  Hrs. Patients Ordered on a Mucolytic Agent:    1. Must always be administered with a bronchodilator. 2. Discontinue if patient experiences worsened bronchospasm, or secretions have lessened to the point that the patient is able to clear them with a cough. Anti-inflammatory and Combination Medications:    1. If the patient lacks prior history of lung disease, is not using inhaled anti-inflammatory medication at home, and lacks wheezing by examination or by history for at least 24 hours, contact physician for possible discontinuation.

## 2021-09-12 NOTE — PROGRESS NOTES
Consult has been called to Dr. Mayda Niño on 9/12/2021 through [facility] @ 7:50 AM    Lm De Leon  9/12/2021

## 2021-09-12 NOTE — PROGRESS NOTES
Spoke with DR Denis Samaniego notified her pt has been beady to 36- 50 at times today  doesn't sustain then back up to 60's orders received for cards consult. And continue to monitor.

## 2021-09-12 NOTE — CONSULTS
HEMATOLOGY/ONCOLOGY CONSULTATION:     2021 8:07 AM    REASON FOR CONSULT:  Weight loss    PROVIDERS:  Иван Gunn MD    CHIEF COMPLAINT:     No chief complaint on file. HISTORY OF PRESENT ILLNESS:     HPI:  80 WM with pmh a fib, CHF, DM2, BELKYS, HTN, HLD. He has a history of smoking and alcohol use but non currently. He was brought to the ER with increased generalized weakness and inability to ambulate. He had intentional weight loss after he weighed over 400 lbs, but is now unintentionally losing weight. He came in for hypoglycemia. His last c-scope was 20 years ago. CT scan in the ER showed pneumonia and hydronephrosis. Urology and GI are consulted. Recent MRI shows a non-specific abnormal marrow signal in the spine. CBC shows leukopenia and mild TCP.      PAST MEDICAL HISTORY:     Past Medical History:   Diagnosis Date    Atrial fibrillation ([address]     CHF (congestive heart failure) ([address]     Diabetes mellitus ([address]     Hyperlipidemia     Hypertension     BELKYS treated with BiPAP        PAST SURGICAL HISTORY:        Past Surgical History:   Procedure Laterality Date    CATARACT REMOVAL WITH IMPLANT Left 2018    EYE SURGERY  12/1/15    right eye cataract    HERNIA REPAIR         SOCIAL HISTORY:     Social History     Socioeconomic History    Marital status:      Spouse name: Not on file    Number of children: Not on file    Years of education: Not on file    Highest education level: Not on file   Occupational History    Not on file   Tobacco Use    Smoking status: Former Smoker     Quit date: 3/1/2005     Years since quittin.5    Smokeless tobacco: Never Used   Substance and Sexual Activity    Alcohol use: No     Alcohol/week: 0.0 standard drinks    Drug use: No    Sexual activity: Yes     Partners: Female   Other Topics Concern    Not on file   Social History Narrative    Not on file     Social Determinants of Health     Financial Resource Strain:     Difficulty of Paying Living Expenses:    Food Insecurity:     Worried About Running Out of Food in the Last Year:     [address] in the Last Year:    Transportation Needs:     Lack of Transportation (Medical):  Lack of Transportation (Non-Medical):    Physical Activity:     Days of Exercise per Week:     Minutes of Exercise per Session:    Stress:     Feeling of Stress :    Social Connections:     Frequency of Communication with Friends and Family:     Frequency of Social Gatherings with Friends and Family:     Attends Ca[employer] Services:     Active Member of Clubs or Organizations:     Attends Club or Organization Meetings:     Marital Status:    Intimate Partner Violence:     Fear of Current or Ex-Partner:     Emotionally Abused:     Physically Abused:     Sexually Abused:        FAMILY HISTORY:     Family History   Problem Relation Age of Onset    Emphysema Father     Cancer Father        ALLERGIES:     Allergies as of 09/11/2021    (No Known Allergies)       MEDICATIONS:     No current facility-administered medications on file prior to encounter. Current Outpatient Medications on File Prior to Encounter   Medication Sig Dispense Refill    tamsulosin (FLOMAX) 0.4 MG capsule Take 0.4 mg by mouth      furosemide (LASIX) 40 MG tablet Take 1 tablet by mouth 2 times daily 60 tablet 3    atorvastatin (LIPITOR) 20 MG tablet Take 20 mg by mouth nightly      verapamil (CALAN-SR) 180 MG CR tablet Take 180 mg by mouth 2 times daily.  benazepril (LOTENSIN) 40 MG tablet Take 40 mg by mouth daily.  warfarin (COUMADIN) 10 MG tablet Take 10 mg by mouth daily.       docusate sodium (COLACE) 100 MG capsule Take 100 mg by mouth every other day          PHYSICAL EXAM:       Vitals:    09/12/21 0750   BP: 106/63   Pulse: 54   Resp: 14   Temp: 94.3 °F (34.6 °C)   SpO2:        General appearance: cachectic  Head: Normocephalic, without obvious abnormality, atraumatic  Neck: No palpable lymphadenopathy in supraclavicular or cervical chains  Lungs: Clear to auscultation bilaterally, no audible rales, wheezes or crackles  Heart: Regular rate and rhythm, S1, S2 normal  Abdomen: Soft, non-tender; bowel sounds normal; no masses,  no organomegaly  Extremities: without cyanosis, clubbing, edema or asymmetry  Skin: No jaundice, purpura or petechiae      LABS:     Lab Results   Component Value Date    WBC 2.1 (L) 09/12/2021    HGB 12.9 (L) 09/12/2021    HCT 39.4 (L) 09/12/2021    MCV 96.3 09/12/2021     (L) 09/12/2021       Lab Results   Component Value Date    GLUCOSE 42 (LL) 09/12/2021    BUN 51 (H) 09/12/2021    CREATININE 0.6 (L) 09/12/2021    K 3.1 (L) 09/12/2021    PHOS 3.5 09/11/2021       Lab Results   Component Value Date    ALKPHOS 172 (H) 09/12/2021     (H) 09/12/2021     (H) 09/12/2021    BILITOT 0.7 09/12/2021    BILIDIR 0.1 06/23/2014    PROT 5.4 (L) 09/12/2021       Lab Results   Component Value Date    MG 2.00 09/12/2021       Lab Results   Component Value Date    LABURIC 8.5 (H) 04/04/2017       No results found for: LDH    Lab Results   Component Value Date    PROTIME >170.0 (H) 09/12/2021    INR >14.27 (HH) 09/12/2021       Lab Results   Component Value Date    IRON 64 05/31/2019    TIBC 280 05/31/2019       IMAGING:     CT HEAD WO CONTRAST  Result Date: 9/11/2021  No acute traumatic intracranial abnormality Atrophy and small-vessel ischemic change Probable calcified meningioma on the right. MRI CERVICAL SPINE WO CONTRAST  Result Date: 9/4/2021  1. Abnormal signal intensity throughout the bone marrow which is of uncertain etiology. T1 weighted images appear fat suppressed however the technologist performing the exam confirm the exams are not fat suppressed. Correlation with any history of Feraheme or iron treatment is recommended. Correlation with CBC is suggested as a chronic anemia could have this appearance.   A marrow replacing process could also have this appearance although considered less likely. 2. Multilevel degenerative changes of the cervical spine most pronounced at C5-6 where there is mild spinal canal stenosis and moderate to severe bilateral neural foraminal narrowing. 3. Mild multilevel spinal canal stenosis from L3-4 to L5-S1 with mild-to-moderate multilevel neural foraminal narrowing, as described above. MRI LUMBAR SPINE WO CONTRAST  Result Date: 9/4/2021  1. Abnormal signal intensity throughout the bone marrow which is of uncertain etiology. T1 weighted images appear fat suppressed however the technologist performing the exam confirm the exams are not fat suppressed. Correlation with any history of Feraheme or iron treatment is recommended. Correlation with CBC is suggested as a chronic anemia could have this appearance. A marrow replacing process could also have this appearance although considered less likely. 2. Multilevel degenerative changes of the cervical spine most pronounced at C5-6 where there is mild spinal canal stenosis and moderate to severe bilateral neural foraminal narrowing. 3. Mild multilevel spinal canal stenosis from L3-4 to L5-S1 with mild-to-moderate multilevel neural foraminal narrowing, as described above. CT ABDOMEN PELVIS W IV CONTRAST Additional Contrast? Oral  Result Date: 9/4/2021  This is a significantly limited study secondary to both motion and the lack of intraperitoneal fat. Patient is severely cachectic. There is a large left-sided inguinal hernia containing portions of the colon. There is no evidence of obstruction. Large amount of stool is noted within the rectal vault. Please correlate for impaction. Indeterminate ovoid area within the right flank just anterior the iliac bone and just below the liver. Finding is nonspecific and may represent a structure within the abdominal wall. Area of peripheral enhancement versus increased density is noted. Underlying soft tissue nodule is not excluded.  Please correlate to clinical exam.  Additional imaging can be obtained as clinically indicated. Calcification of what appears to be the right testicle. Scrotal ultrasound could be considered to further evaluate as clinically indicated. XR CHEST PORTABLE  Result Date: 9/11/2021  Left lower lobe pneumonia     CT CHEST ABDOMEN PELVIS W CONTRAST  Result Date: 9/11/2021  Multifocal pneumonia, greatest in the left lower lobe, new compared to 09/03/2021. There is a background emphysema and severe coronary artery disease. Filling defects are seen in the left lower lobe, suggesting sequelae from mucous plugging or aspiration. Bilateral hydronephrosis, likely secondary to markedly distended bladder. Left-sided inguinal hernia containing colon. No resultant bowel obstruction. ASSESSMENT:     Problem List Items Addressed This Visit     None      Visit Diagnoses     General weakness    -  Primary    Pneumonia due to infectious organism, unspecified laterality, unspecified part of lung        Relevant Medications    azithromycin (ZITHROMAX) 500 mg in D5W 250ml addavial (Completed)    doxycycline (VIBRAMYCIN) 100 mg in dextrose 5 % 100 mL IVPB    cefTRIAXone (ROCEPHIN) 1000 mg IVPB in 50 mL D5W minibag (Start on 9/12/2021  5:30 PM)    promethazine (PHENERGAN) tablet 12.5 mg    Supratherapeutic international normalized ratio (INR)        Elevated brain natriuretic peptide (BNP) level                    PLAN:     1. Leukopenia/TCP    - WBC drop new today, Plt mildly low 115  - this is new with normal CBC last month - likely from pneumonia  - marrow signal abnl on recent MRI - finding is non-specific  - check LINDY, RF, ESR, SPEP, flow, LDH, hapto, retic, Hep C, HIV, B12, folate, DIC labs  - request smear  - may need BMBX if worsening    2. Weight Loss/Cahcexia    - no definite malignancy on CT scan  - bilateral hydronephrosis on CT - urology to see  - GI consulted - last c-scope 20 years ago    3.  Pneumonia    - abx per [facility] GAIL Irving MD

## 2021-09-12 NOTE — H&P
Hospital Medicine History & Physical      PCP: Иван Gunn MD    Date of Admission: 9/11/2021    Date of Service: Pt seen/examined on 9/11/2021  Pt seen/examined face to face on and admitted as inpatient with expected LOS greater than two midnights due to medical therapy    Chief Complaint:    Increased generalized weakness, fatigue and inability to ambulate with hyperglycemia    History Of Present Illness:      80 y.o. male who presented to [facility] with past medical history of A. fib on Coumadin, HFpEF, type 2 diabetes, hypertension, hyperlipidemia, BELKYS on BiPAP presented to the ED for worsening fatigue unintentional weight loss and inability ambulate. Patient reported he has lost almost 10 to 20 pounds unintentionally from previous. Patient in the picture in the chart looks much more skinnier malnourished anorexic. Patient reported that he has been having appropriate appetite eating and drinking and has been having increased cough no phlegm but does report shortness of breath on exertion. Alleviated with rest.  Patient reports that he was seen his primary care and was reported that he needs to be follow-up with GI and heme-onc. Patient's daughter is a pediatrician physician and I also spoke on the phone to discuss care. Patient does not currently smoke drink however does have extensive 20+ pack years of smoking in addition to drinking. Patient also more than 20 years from the time where he needs a colonoscopy. Patient also reports been having intermittent diarrhea and constipation no known alleviating exacerbating factor as well. No reported bleeding. Patient also reports that he has lower extremity edema that is progressive despite putting Ace wrap in addition has a lesion on the right foot which the patient does not take care of nor follow-up. CODE STATUS discussed and the patient is a full code.     Past Medical History:          Diagnosis Date    [facility]     [facility]     Diabetes mellitus [facility]     Hyperlipidemia     Hypertension     BELKYS treated with BiPAP        Past Surgical History:          Procedure Laterality Date    CATARACT REMOVAL WITH IMPLANT Left 03/06/2018    EYE SURGERY  12/1/15    right eye cataract    HERNIA REPAIR         Medications Prior to Admission:      Prior to Admission medications    Medication Sig Start Date End Date Taking? Authorizing Provider   tamsulosin (FLOMAX) 0.4 MG capsule Take 0.4 mg by mouth 1/30/17   Historical Provider, MD   furosemide (LASIX) 40 MG tablet Take 1 tablet by mouth 2 times daily 12/13/15   No Nicole MD   atorvastatin (LIPITOR) 20 MG tablet Take 20 mg by mouth nightly    Historical Provider, MD   verapamil (CALAN-SR) 180 MG CR tablet Take 180 mg by mouth 2 times daily. Historical Provider, MD   benazepril (LOTENSIN) 40 MG tablet Take 40 mg by mouth daily. Historical Provider, MD   warfarin (COUMADIN) 10 MG tablet Take 10 mg by mouth daily. Historical Provider, MD   docusate sodium (COLACE) 100 MG capsule Take 100 mg by mouth every other day     Historical Provider, MD       Allergies:  Patient has no known allergies. Social History:          TOBACCO:   reports that he quit smoking about 16 years ago. He has never used smokeless tobacco.  ETOH:   reports no history of alcohol use.   E-Cigarettes/Vaping Use     Questions Responses    E-Cigarette/Vaping Use     Start Date     Passive Exposure     Quit Date     Counseling Given     Comments             Family History:      Reviewed in detail         Problem Relation Age of Onset    Emphysema Father     Cancer Father        REVIEW OF SYSTEMS:     Constitutional:  No Fever, No Chills, No Night Sweats  ENT/Mouth:  No Nasal Congestion,  No Hoarseness, No new mouth lesion  Eyes:  No Eye Pain, No Redness, No Discharge  Cardiovascular:  No Chest Pain, No Orthopnea, No Palpitations  Respiratory: + Cough, No Sputum, +

## 2021-09-12 NOTE — ED NOTES
2013- Perfect serve sent to Dr. Jose Marion regarding admission    2045- Dr. Jose Marion placed orders  Little Ortiz  09/11/21 2014       Little Ortiz  09/11/21 2046

## 2021-09-13 NOTE — PLAN OF CARE
Nutrition Problem #1: Severe malnutrition  Intervention: Food and/or Nutrient Delivery: Continue Current Diet, Start Oral Nutrition Supplement  Nutritional Goals: patient will consume 75% or greater of meals on ADULT DIET;  Regular

## 2021-09-13 NOTE — PROGRESS NOTES
PROGRESS NOTE  S:71 yrs Patient  admitted on 9/11/2021 with General weakness [R53.1]  Elevated brain natriuretic peptide (BNP) level [R79.89]  Supratherapeutic INR [R79.1]  Supratherapeutic international normalized ratio (INR) [R79.1]  Pneumonia due to infectious organism, unspecified laterality, unspecified part of lung [J18.9] . Today he feels well. He is tolerating diet. His last BM was two days ago. Exam:   Vitals:    09/13/21 0828   BP: 119/74   Pulse: 57   Resp: 16   Temp: 96.1 °F (35.6 °C)   SpO2: 94%       Due to the current efforts to prevent transmission of COVID-19 and also the need to preserve PPE for other caregivers, a face-to-face encounter with the patient was not performed. That being said, all relevant records and diagnostic tests were reviewed, including laboratory results and imaging. Please reference any relevant documentation elsewhere. Care will be coordinated with the primary service. Medications: Reviewed    Labs:  CBC:   Recent Labs     09/12/21  0532 09/12/21  0532 09/12/21  0851 09/12/21  1821 09/13/21  0613   WBC 2.1*  --   --  6.3 7.8   HGB 12.9*  --   --  12.9* 13.0*   HCT 39.4*   < > 35.3* 40.2* 39.1*   MCV 96.3  --   --  96.8 96.0   *  --   --  116* 103*    < > = values in this interval not displayed. BMP:   Recent Labs     09/11/21  1535 09/12/21  0532 09/13/21  0613    143 141   K 4.2 3.1* 3.2*    105 103   CO2 28 26 28   PHOS 3.5  --   --    BUN 53* 51* 41*   CREATININE 0.7* 0.6* 0.6*     LIVER PROFILE:   Recent Labs     09/11/21  1535 09/11/21  1535 09/12/21  0532 09/12/21  0850 09/13/21  0613   *  --  103*  --  149*   *  --  135*  --  158*   PROT 6.2*   < > 5.4* 4.7* 5.5*   BILITOT 0.8  --  0.7  --  1.0   ALKPHOS 198*  --  172*  --  218*    < > = values in this interval not displayed.      PT/INR:   Recent Labs     09/12/21  0532 09/12/21  1821 09/13/21  0628   INR >14.27* 13.22* >14.27* IMAGING:  CT CHEST ABDOMEN PELVIS W CONTRAST   Final Result   Multifocal pneumonia, greatest in the left lower lobe, new compared to   09/03/2021. There is a background emphysema and severe coronary artery   disease. Filling defects are seen in the left lower lobe, suggesting   sequelae from mucous plugging or aspiration. Bilateral hydronephrosis, likely secondary to markedly distended bladder. Left-sided inguinal hernia containing colon. No resultant bowel obstruction. CT HEAD WO CONTRAST   Final Result   No acute traumatic intracranial abnormality      Atrophy and small-vessel ischemic change      Probable calcified meningioma on the right. XR CHEST PORTABLE   Final Result   Left lower lobe pneumonia               Impression: 80year old male with a history of atrial fibrillation on Coumadin, CHF, diabetes, HTN, HLD, and BELKYS on BiPAP admitted with weakness due to pneumonia, abnormal LFTs, and weight loss. Recommendation:  1. Continue supportive care  2. Monitor LFTs  3. Monitor and document output  4. Monitor and replenish electrolytes  5. Continue broad spectrum antibiotics  6. Liver serologies in progress  7. Continue diet as tolerated  8. PT/OT  9. Hematology following - weight loss evaluation   10. Cardiology consulted - chronic Afib evaluation   11. Will follow  12.  Outpatient EGD +/- colonoscopy upon d/c      Catalino Echevarria PA-C  12:38 PM 9/13/2021

## 2021-09-13 NOTE — CARE COORDINATION
Case Management Assessment  Initial Evaluation      Patient Name: Morris Briseno  YOB: 1950  Diagnosis: General weakness [R53.1]  Elevated brain natriuretic peptide (BNP) level [R79.89]  Supratherapeutic INR [R79.1]  Supratherapeutic international normalized ratio (INR) [R79.1]  Pneumonia due to infectious organism, unspecified laterality, unspecified part of lung [J18.9]  Date / Time: 9/11/2021  3:19 PM    Admission status/Date: 09/11/2021 Inpatient   Chart Reviewed: Yes      Patient Interviewed: Yes   Family Interviewed:  No      Hospitalization in the last 30 days:  No      Health Care Decision Maker :   Primary Decision MakerRsai Pondh - Spouse - [phone]    Primary Decision Maker: Perico Blake - Child - [phone]    Primary Decision Maker: Maryedilia Blocker - Child - [phone]    (CM - must 1st enter selection under Navigator - emergency contact- [place] Relationship and pick relationship)   Who do you trust or have selected to make healthcare decisions for you      Met with: pt   Interview conducted  (bedside/phone): phone call to his cell phone as [phone]    Current PCP: Shirley Swenson MD    [address] required for SNF : Y          3 night stay required -  N    ADLS  Support Systems/Care Needs:    Transportation: family    Meal Preparation: self    Housing  Living Arrangements: pt lives at home with wife. Steps: 3.  He has a board to roll up in a chair if needed  Intent for return to present living arrangements: Yes  Identified Issues: [id number] [address] [facility] with [address] : No Agency:(Services)     Passport/Waiver : No  :                      Phone Number:    Passport/Waiver Services: n/a          Durable Medical Equiptment   DME Provider: n/a  Equipment:   Walker_x__Cane_x__RTS___ BSC___Shower Chair___Hospital Bed___W/C____Other________  02 at ____Liter(s)---wears(frequency)_______ [facility] ___ CPAP___ BiPap_x__   N/A____      Home O2 Use :  No    If No for home O2---if presently on O2 during hospitalization:  No  if yes CM to follow for potential DC O2 need  Informed of need for care provider to bring portable home O2 tank on day of discharge for nursing to connect prior to leaving:   Not Indicated  Verbalized agreement/Understanding:   Not Indicated    Community Service Affiliation  Dialysis:  No    · Agency:  · Location:  · Dialysis Schedule:  · Phone:   · Fax: Other Community Services: Outpatient Therapy in [facility]    DISCHARGE PLAN: Explained Case Management role/services. Chart review completed. Called and spoke with pt on his cell phone due to covid isolation. Pt stated he is independent at home. He stated he takes his time with his walker. He stated that his wife is an RN here at the hospital and she has been approved to be off with him for 30 days so she will be with him 24/7 on discharge. He stated that his daughter is a doctor and his son in law works for hospice. He stated he has good support. He denied needing [facility]  for RN/PT/OT when writer inquired. He is aware no current discharge order placed. He denied needs or questions for CM. CM will follow. Please notify CM if need or concerns arise.

## 2021-09-13 NOTE — PROGRESS NOTES
AM assessment complete. Gave Vit K per order. See mar. A/O x 4. Denies any pain. Water/coffee given. Bed locked/lowest position. Call light within reach.

## 2021-09-13 NOTE — PROGRESS NOTES
PM assessment completed, see flowsheet. Patient is refusing to take his lipitor and robitussin at this time. No complaints or needs at this time. Socks were gave to patient as well. Call light within reach, bed in lowest position.

## 2021-09-13 NOTE — PROGRESS NOTES
no apparent distress ; temporal wasting   EYES: pupils equal, round and reactive to light, sclera clear and conjunctiva normal  ENT: Normocephalic, without obvious abnormality, atraumatic  NECK: supple, symmetrical, no jugular venous distension and no carotid bruits   HEMATOLOGIC/LYMPHATIC: no cervical, supraclavicular or axillary lymphadenopathy   LUNGS: no increased work of breathing and clear to auscultation   CARDIOVASCULAR: regular rate and rhythm, normal S1 and S2, no murmur noted  ABDOMEN: normal bowel sounds x 4, soft, non-distended, non-tender, no masses palpated, no hepatosplenomgaly   MUSCULOSKELETAL: full range of motion noted, tone is normal  NEUROLOGIC: awake, alert, oriented to name, place and time. Motor skills grossly intact. SKIN: scattered bruising and skin tears to Bilateral forearms   EXTREMITIES: no LE edema       Data      Recent Labs     09/12/21  0532 09/12/21  0532 09/12/21  0851 09/12/21  1821 09/13/21  0613   WBC 2.1*  --   --  6.3 7.8   HGB 12.9*  --   --  12.9* 13.0*   HCT 39.4*   < > 35.3* 40.2* 39.1*   *  --   --  116* 103*   MCV 96.3  --   --  96.8 96.0    < > = values in this interval not displayed.         Recent Labs     09/11/21  1535 09/12/21  0532 09/13/21  0613    143 141   K 4.2 3.1* 3.2*    105 103   CO2 28 26 28   PHOS 3.5  --   --    BUN 53* 51* 41*   CREATININE 0.7* 0.6* 0.6*     Recent Labs     09/11/21  1535 09/12/21  0532 09/13/21  0613   * 103* 149*   * 135* 158*   BILITOT 0.8 0.7 1.0   ALKPHOS 198* 172* 218*       Magnesium:    Lab Results   Component Value Date    MG 1.90 09/13/2021    MG 2.00 09/12/2021    MG 2.20 09/11/2021         Problem List  Patient Active Problem List   Diagnosis    At[facility] fibrillation ([facility]    Diabetes mellitus ([facility]    Hyperlipidemia    Urinary retention    Sleep apnea    Morbid obesity with BMI of 40.0-44.9, adult (HCC)    Prolonged pt (prothrombin time)    Snoring    Dyspnea    Chronic atrial fibrillation ([facility]    Essential hypertension    BELKYS treated with BiPAP    Nocturnal hypoxemia    Supratherapeutic INR    General weakness       ASSESSMENT AND PLAN:    1. Leukopenia/TCP     - WBC stable now at 7.8 and Hgb 13 with plt 103k now   - this is new with normal CBC last month - likely from pneumonia  - marrow signal abnl on recent MRI - finding is non-specific  - Hapto is 107, , CRP high at 178, sed rate is 48, RF slightly high at 15, SPEP no bands ; Iron stores are 40, TIBC 102, and sat is normal at 39 percent. Ferritin is grossly high at 2700 ; Hepatitis is negative, IGG slightly low at 625- likely due to acute infection ; HIV pending, B12 over 2k, Folate is 12 ; LINDY pending   - request smear  - may need BMBX if worsening  - Flow cytometry is pending with lymphopenia on diff      2. Weight Loss/[place]ia     - no definite malignancy on CT scan  - bilateral hydronephrosis on CT - urology to see  - GI consulted - last c-scope 20 years ago; EGD and C scope are planned   - Hgb A1C is pending      3. Pneumonia     - abx per IM    4.  Coagulopathy   - INR over 14 today   - Vitamin K 10 mg PO ordered X 1 dose and a mixing study to assess for underlying inhibitor   - INR checks daily ; likely due to profound malnutrition       ONCOLOGIC DISPOSITION: per IM       Rupali De Paz CNP  [facility]   Please contact through [address]

## 2021-09-13 NOTE — PLAN OF CARE
Problem: Falls - Risk of:  Goal: Will remain free from falls  Description: Will remain free from falls  Outcome: Ongoing     Problem: Bleeding:  Goal: Will show no signs and symptoms of excessive bleeding  Description: Will show no signs and symptoms of excessive bleeding  Outcome: Ongoing

## 2021-09-13 NOTE — PROGRESS NOTES
Comprehensive Nutrition Assessment    Type and Reason for Visit:  Initial, Positive Nutrition Screen, Consult (Consult for poor appetite/intake x 5 or more days; +weight loss, wounds, MST = 4;)    Nutrition Recommendations/Plan:   1. Continue ADULT DIET; Regular and monitor patient need/desire for ONS  2. Monitor appetite and meal intake. (Please document meal intake % in flowsheets.)  3. Monitor weight trends, bowel function, nutrition related lab values and for any additional fluid disturbances. 4. Monitor GI notes and POC. Nutrition Assessment:  patient is severely malnourished AEB severe fat loss/muscle wasting, unintentional weight loss x 2 months PTA, weakness, fatigue, and falls PTA, and he is at risk for further compromise d/t underweight BMI status for age over 72 y.o. (BMI= 20.13), altered nutrition related lab values and possible GI dysfunction + need to R/O GI neoplasm; Will continue ADULT DIET; Regular and monitor need for ONS    Malnutrition Assessment:  Malnutrition Status:  Severe malnutrition    Context:  Acute Illness     Findings of the 6 clinical characteristics of malnutrition:  Energy Intake:  No significant decrease in energy intake (patient has been eating his normal)  Weight Loss:  No significant weight loss (4% or -4.7#  x 3 months PTA, however suspect there has been additional weight loss d/t +2 pitting edema when current weight was taken)     Body Fat Loss:  7 - Moderate body fat loss Buccal region, Triceps, Orbital   Muscle Mass Loss:  7 - Moderate muscle mass loss Temples (temporalis), Clavicles (pectoralis & deltoids), Thigh (quadraceps), Calf (gastrocnemius)  Fluid Accumulation:  1 - Mild Extremities, Generalized (BLE + 2 pitting edema; generalized edema)   Strength:       Estimated Daily Nutrient Needs:  Energy (kcal):  1097-5125 kcals based on 28-30 kcals/kg/CBW;  Weight Used for Energy Requirements:  Current     Protein (g):  74-87 g protein based on 1.2-1.4 g/kg/CBW (unsure of wounds at this time); Weight Used for Protein Requirements:  Current        Fluid (ml/day):  4658-6209 ml; Method Used for Fluid Requirements:  1 ml/kcal      Nutrition Related Findings:  met with patient + wife at bedside; pt appears cachectic; pt reports his weight loss start about 5 1/2 years ago, when he weighed over 400#'s. the weight loss initially started out as intentional weight loss, however per pt wife, he has had unintentional weight loss x ~2 months PTA; pt wife reports that at pt MD visit on june 29, pt weighed 141#, and on Aug 23, pt weighed 131#, both standing scale weights; pt reports he has not had any loss of appetite + has been eating his normal, however patient wife shared some concerns that patient may not be eating enough; patient + wife shared husbands daily diet- breakfast= banana and 1/2 cup measured of bran flakes, later in the afternoon=  reports a \"big bowl\" of fruit, typically apples, grapes, etc., dinner= 2 meats and a large salad with blue cheese dressing, and 2 more fruits at night time; pt wife reports that pt doesn't eat carbs, except for fruit and milk, + this is how he initially started eating at the beginning of his weight loss journey; abdomen flat, BS active; no N/V/D/C per pt; BLE +2 pitting edema, generalized edema; no difficulty c/s, +pt has his own teeth; patient is jaundiced; K, Cr, H/H are low; BUN, BG, Alk phos, AST/ALT are elevated; patient has lipitor, ducolax, colace, doxycycline, robitussin, glycolax, KCL, flomax and vitamin K ordered at this time; noted per GI notes, pt to have outpatient EGD + colonscopy to R/O GI neoplasm; pt wanted \"to think about\" ONS, declined at this time; Wounds:  Partial Thickness (partial thickness skin loss on right achilles per wound notes)       Current Nutrition Therapies:    ADULT DIET;  Regular    Anthropometric Measures:  · Height: 5' 9\" (175.3 cm)  · Current Body Weight: 136 lb 4.8 oz (61.8 kg) (obtained 9/12/2021; weight method not specified)   · Admission Body Weight: 136 lb 4.8 oz (61.8 kg) (obtained 9/12/2021)    · Usual Body Weight: 142 lb (64.4 kg) (obtained 6/29/2021 per past encounters; pt wife reports this was a standing scale weight)     · Ideal Body Weight: 160 lbs; % Ideal Body Weight 85.2 %   · BMI: 20.1  · BMI Categories: Underweight (BMI less than 22) age over 72       Nutrition Diagnosis:   · Severe malnutrition related to other (comment) (suspected inadequate energy intake) as evidenced by severe muscle loss, severe loss of subcutaneous fat, lab values, weight loss, BMI, localized or generalized fluid accumulation, other (comment), poor intake prior to admission (possible GI dysfunction, noted need to r/o GI neoplasm in outpatient EGD/colonscopy)    Nutrition Interventions:   Food and/or Nutrient Delivery:  Continue Current Diet, Start Oral Nutrition Supplement  Nutrition Education/Counseling:  No recommendation at this time   Coordination of Nutrition Care:  Continue to monitor while inpatient    Goals:  patient will consume 75% or greater of meals on ADULT DIET; Regular       Nutrition Monitoring and Evaluation:   Behavioral-Environmental Outcomes:  None Identified   Food/Nutrient Intake Outcomes:  Food and Nutrient Intake  Physical Signs/Symptoms Outcomes:  Biochemical Data, GI Status, Fluid Status or Edema, Nutrition Focused Physical Findings, Skin, Weight     Discharge Planning:     Too soon to determine     Electronically signed by Russell Morse RD, LD on 9/13/21 at 5:40 PM EDT    Contact: [phone]

## 2021-09-13 NOTE — PROGRESS NOTES
Pt BS was 43 at 0710 gave pt dextrose 12.5g  per PRN orders rechecked BS up to 119 . and pt PT/INR was 170.0 and 14.27 gave Vit K per orders.

## 2021-09-13 NOTE — PLAN OF CARE
Problem: Skin Integrity:  Goal: Will show no infection signs and symptoms  Description: Will show no infection signs and symptoms  Outcome: Ongoing  Goal: Absence of new skin breakdown  Description: Absence of new skin breakdown  Outcome: Ongoing     Problem: Falls - Risk of:  Goal: Will remain free from falls  Description: Will remain free from falls  9/13/2021 0129 by Marylee Lea, RN  Outcome: Ongoing  9/12/2021 2034 by Danette Jerez RN  Outcome: Ongoing  Goal: Absence of physical injury  Description: Absence of physical injury  Outcome: Ongoing     Problem: Bleeding:  Goal: Will show no signs and symptoms of excessive bleeding  Description: Will show no signs and symptoms of excessive bleeding  9/13/2021 0129 by Marylee Lea, RN  Outcome: Ongoing  9/12/2021 2034 by Danette Jerez RN  Outcome: Ongoing

## 2021-09-13 NOTE — CONSULTS
[facility]  [phone]        Reason for Consultation/Chief Complaint: \"I have been asked to see patient for chronic  afib\"    History of Present Illness:  Ulysses Hose is a 80 y.o. patient who presented to the hospital with complaints of generalized weakness fatigue inability to ambulate. Diagnosed with  hyperglycemia pneumonia urinary retention. He has shortness of breath on exertion and has cough. He has edema legs and a lesion on rt foot. There is about 10-20 lb unintentional weight loss inspite of good appetite and eating. Alternating constipation and diarrhea  Past history of afib on coumadin now INR is in supratherapeutic range. Heart failure preserved EF,DM 2, Hypertension hyperlipidemia BELKYS-BIPAP   Patient is in COVID isolation. I have been asked to provide consultation regarding further management and testing. Past Medical History:   has a past medical history of Atrial fibrillation ([address] CHF (congestive heart failure) ([address] Diabetes mellitus ([facility] Hyperlipidemia, Hypertension, and BELKYS treated with BiPAP. Surgical History:   has a past surgical history that includes hernia repair; eye surgery (12/1/15); and Cataract removal with implant (Left, 03/06/2018). Social History:   reports that he quit smoking about 16 years ago. He has never used smokeless tobacco. He reports that he does not drink alcohol and does not use drugs. Family History:  family history includes Cancer in his father; Emphysema in his father. Home Medications:  Were reviewed and are listed in nursing record. and/or listed below  Prior to Admission medications    Medication Sig Start Date End Date Taking?  Authorizing Provider   tamsulosin (FLOMAX) 0.4 MG capsule Take 0.4 mg by mouth 1/30/17  Yes Historical Provider, MD   furosemide (LASIX) 40 MG tablet Take 1 tablet by mouth 2 times daily 12/13/15  Yes Ananya Petersen MD   atorvastatin (LIPITOR) 20 MG tablet Take 20 mg by mouth nightly   Yes Historical Provider, MD   verapamil (CALAN-SR) 180 MG CR tablet Take 180 mg by mouth 2 times daily. Yes Historical Provider, MD   benazepril (LOTENSIN) 40 MG tablet Take 40 mg by mouth daily. Yes Historical Provider, MD   warfarin (COUMADIN) 10 MG tablet Take 10 mg by mouth daily. Yes Historical Provider, MD   docusate sodium (COLACE) 100 MG capsule Take 100 mg by mouth every other day    Yes Historical Provider, MD        Allergies:  Patient has no known allergies. Review of Systems: NA  Physical Examination:    Vitals:    09/13/21 0432   BP: 117/63   Pulse: 68   Resp: 16   Temp: 97.1 °F (36.2 °C)   SpO2: 92%    Weight: 136 lb 4.8 oz (61.8 kg)         General Appearance:  Due to the current efforts to prevent transmission of COVID-19 and also the need to preserve PPE for other caregivers, a face-to-face encounter with the patient was not performed. That being said, all relevant records and diagnostic tests were reviewed, including laboratory results and imaging. Please reference any relevant documentation elsewhere. Care will be coordinated with the primary service.      Head:     Eyes:         Nose:    Throat:    Neck:        Lungs:      Chest Wall:     Heart:     Abdomen:              Extremities:    Pulses:    Skin:    Pysch:    Neurologic:         Labs  CBC:   Lab Results   Component Value Date    WBC 7.8 09/13/2021    RBC 4.07 09/13/2021    HGB 13.0 09/13/2021    HCT 39.1 09/13/2021    MCV 96.0 09/13/2021    RDW 15.2 09/13/2021     09/13/2021     CMP:    Lab Results   Component Value Date     09/13/2021    K 3.2 09/13/2021     09/13/2021    CO2 28 09/13/2021    BUN 41 09/13/2021    CREATININE 0.6 09/13/2021    GFRAA >60 09/13/2021    GFRAA >60 08/03/2012    AGRATIO 0.9 09/13/2021    LABGLOM >60 09/13/2021    GLUCOSE 57 09/13/2021    PROT 5.5 09/13/2021    PROT 7.0 08/03/2012    CALCIUM 8.9 09/13/2021    BILITOT 1.0 09/13/2021    ALKPHOS 218 09/13/2021     09/13/2021     09/13/2021     PT/INR:  No results found for: PTINR  Lab Results   Component Value Date    CKTOTAL 100 08/23/2021    TROPONINI <0.01 09/11/2021     CT chest and abdomen9.11.21  Multifocal pneumonia, greatest in the left lower lobe, new compared to 09/03/2021. There is a background emphysema and severe coronary artery disease. Filling defects are seen in the left lower lobe, suggesting sequelae from mucous plugging or aspiration. Bilateral hydronephrosis, likely secondary to markedly distended bladder. Left-sided inguinal hernia containing colon. No resultant bowel obstruction. Chest xray 9.11.21   Impression   Left lower lobe pneumonia       EKG 9.11.21  Atrial fibrillation with slow ventricular responseRight bundle branch blockAbnormal ECGLow voltage QRSNo significant change was foundWhen compared with ECG of12. 9.15Confirmed by Nanci Cerrato MD, [address] ([id number]) on 9/12/2021 5:35:11 AM    SummaryEcho doppler 2016   Left ventricular size is severely increased . Mild LVH. Ejection fraction is visually estimated to be normal Estimated at 55%. No   regional wall motion abnormality. Mitral valve is structurally normal.   Mitral valve leaflets appear to open adequately. Trivial mitral regurgitation is present. The left atrium is normal in size by volume measurement. Visually left   atrium is mildly dilated. The aortic valve is normal in structure and function. The aortic valve appears tricuspid . No evidence of aortic valve regurgitation. No evidence of aortic valve stenosis. Right ventricular systolic function is normal .   TAPSE 1.8cm. The right ventricle is mildly enlarged. The right atrium is dilated.       Signature      ------------------------------------------------------------------   Electronically signed by Azalia Gray MD, [facility] (Interpreting   physician) on 01/15/2016 at 02:27 PM ------------------------------------------------------------------    Assessment  Permanent atrial fibrillations rate is controlled  supratherapeutic INR on warfarin  Hypokalemia  Essential hypertension  DM2  Elevated liver enzymes  Thrombocytopenia  Hydronephrosis urinary tract obstruction  He is euthyroid  Patient Active Problem List   Diagnosis    At[facility] fibrillation ([facility]    Diabetes mellitus ([facility]    Hyperlipidemia    Urinary retention    Sleep apnea    Morbid obesity with BMI of 40.0-44.9, adult (HCC)    Prolonged pt (prothrombin time)    Snoring    Dyspnea    Chronic atrial fibrillation (HCC)    Essential hypertension    BELKYS treated with BiPAP    Nocturnal hypoxemia    Supratherapeutic INR         Plan:    I had the opportunity to review the clinical symptoms and presentation of Camila Thayer. I recommend that we hold warfarin consider newer non vit K antagonist  Rate is controlled  Will follow him as needed  Main treatment will be for pneumonia   Work up for weight loss   Echo if neg for COVID  Will see prn please call    All questions and concerns were addressed to the patient/family. Alternatives to my treatment were discussed. The note was completed using EMR. Every effort was made to ensure accuracy; however, inadvertent computerized transcription errors may be present.

## 2021-09-13 NOTE — CONSULTS
Palliative Care Initial Note  Palliative Care Admit date: 09/13/21    Advance Directives: Full Code    Plan of care/goals: continue with treatment options      Social/Spiritual: Prayer Support    Plan: writer spoke with pt via TC and pt would like to continue with treatment options and requesting Palliative Care referral at d/c. CM updated on above plan of care.  Awaiting PT rec. follow    Reason for consult:    ___ Advance Care Planning  __x_ Transition of Care Planning  ___ Psychosocial/Spiritual Support  ___ Symptom Management    Lawson Champion RN Palliative Care

## 2021-09-13 NOTE — PROGRESS NOTES
Inpatient Occupational Therapy  Evaluation and Treatment    Unit: [facility]  Date:  9/13/2021  Patient Name:    Cl Renee  Admitting diagnosis:  General weakness [R53.1]  Elevated brain natriuretic peptide (BNP) level [R79.89]  Supratherapeutic INR [R79.1]  Supratherapeutic international normalized ratio (INR) [R79.1]  Pneumonia due to infectious organism, unspecified laterality, unspecified part of lung [J18.9]  Admit Date:  9/11/2021  Precautions/Restrictions/WB Status/ Lines/ Wounds/ Oxygen: Fall risk, Bed/chair alarm, Lines -Valentine catheter, Telemetry and Continuous pulse oximetry    Treatment Time:  2570-2677   Treatment Number: 1   Timed code treatment minutes 60 minutes   Total Treatment minutes:   70   minutes    Patient Goals for Therapy:  \" go home  \"      Discharge Recommendations: SNF  DME needs for discharge: defer to facility       Therapy recommendations for staff:   Assist of 2 with use of ANTONIA STEDY for all transfers to/from [facility]  to/from chair    History of Present Illness: per H&P on 9-11-21 Yan FUNG   80 y.o. male history of hypertension hyperlipidemia A. fib, CHF who presents to the emergency department complaining of generalized weakness fatigue, falls. Also found to have hypoglycemia earlier today with a blood glucose into the 40s. Former diabetic form of Metformin no longer on medications for this with improved A1c. Patient has had weight loss in the last month over 15 pounds unintentionally. Family was concerned over the last 1 month increasing fatigue and difficulty with completing ADLs. Has had difficulty now getting out of bed in the last 24 hours requiring assistance. Had a fall down on on the ground but did not hit head. Is on Coumadin for history of A. fib. No chest pain no shortness of breath no abdominal pain. Home Health S4 Level Recommendation:  NA  AM-PAC Score: 16    Preadmission Environment    Pt.  Lives with spouse   Home environment:            one story home  Steps to enter first floor: 3 steps to enter and hand rail unilateral; ramp but pt does not use  Steps to second floor:         N/A  Bathroom: tub/shower unit; handheld shower; uses BSC as a shower chair. Also uses BSC over the toilet. Equipment owned: RW, [facility], quad cane and has access to a wheelchair from family member; BiPap. Lift chair will be delivered next week. Sleeps in a flat bed. Has been indep with bed mobility until the last few days prior to admission.      Preadmission Status:  Pt. Able to drive: No (wife drives)  Pt Fully independent with ADLs: Yes (assistance from wife to get in/out of shower, getting shoes on/off, more recently - LB dressing). Pt. Required assistance from family for: Cleaning, Cooking and Laundry   Pt. independent for transfers and gait and walked with Aaliyah Piper and Zenon Hawks (progressively more so with walker). Wife helps advance LE when going up/down stairs to get into house. Wife and pt report mobility has been dramatically more challenging in last few days. History of falls          Yes - fell with wife while she was helping him to the bathroom 2 days ago. Baseline LE edema.       Pain   Yes  Location: bilat LE's  Rating: moderate /10 \"semi-numb but that is brenda normal\"  Pain Medicine Status: No request made     Cognition    A&O x4   Able to follow {Cognition:[id number]    Subjective  Patient lying supine in bed with family at bedside. Pt agreeable to this OT eval & tx.      Upper Extremity ROM:    WFL    Upper Extremity Strength:        R UE   Shoulder flexion 4-/5      Elbow flexion 4-/5    Elbow extension 4-/5   Wrist extension  4/5     fair       L UE  Shoulder flexion 4-/5      Elbow flexion 4-/5    Elbow extension4-5       Wrist extension 4/5     fair     Upper Extremity Sensation    WFL    Upper Extremity Proprioception:  WFL    Coordination and Tone  WFL    Balance  Functional Sitting Balance:  Diminished  Functional Standing Balance:Impaired    Bed mobility: Supine to sit:   Mod A  and 2 persons  Sit to supine:   Not Tested  Rolling:    Not Tested  Scooting in sitting: Mod A   Scooting to head of bed:   Not Tested    Bridging:   Not Tested    Transfers:    Sit to stand: Mod A  and 2 persons  Stand to sit:  Mod A  and 2 persons  Bed to chair:   skylerdy used - total assist  Standard toilet: Not Tested  Bed to [facility]:  Not Tested    Dressing:      UE:   Not Tested  LE:    Max A  to don pullups     Bathing:    UE:  Not Tested  LE:  Not Tested    Eating:   Not Tested    Toileting:  Not Tested    Activity Tolerance   Pt completed therapy session with dizziness with supine to sit , fatigue     Positioning Needs:   Pt up in chair, no alarm needed, positioned in proper neutral alignment and pressure relief provided. no alarm needed per nurse- pts wife is present     Exercise / Activities Initiated:   N/A    Patient/Family Education:   Role of OT    Assessment of Deficits: Pt seen for Occupational therapy evaluation in acute care setting. Pt demonstrated decreased Activity tolerance, ADLs, Balance , Bed mobility, Strength and Transfers. Pt functioning below baseline and will likely benefit from skilled occupational therapy services to maximize safety and independence. Goal(s) : To be met in 3 Visits:  1). Bed to toilet/BSC: Mod A  and 2 persons with RW    To be met in 5 Visits:  1). Supine to/from Sit:  Min A   2). Upper Body Bathing:   CGA  3). Lower Body Bathing: Mod A   4). Upper Body Dressing:  Min A   5). Lower Body Dressing: Mod A  and Max A   6). Pt to demonstrate UE exs x 15 reps with minimal cues    Rehabilitation Potential:  Fair for goals listed above. Strengths for achieving goals include: Family Support and Pt cooperative  Barriers to achieving goals include:  Complexity of condition     Plan:   To be seen 3-5 x/wk while in acute care setting for therapeutic exercises, bed mobility, transfers, dressing, bathing, family/patient education, ADL/IADL retraining, energy conservation training.      Maximo Correia OTR/L [id number]          If patient discharges from this facility prior to next visit, this note will serve as the Discharge Summary

## 2021-09-13 NOTE — PROGRESS NOTES
Inpatient Physical Therapy Evaluation and Treatment    Unit: [facility]  Date:  9/13/2021  Patient Name:    Soni Abreu  Admitting diagnosis:  General weakness [R53.1]  Elevated brain natriuretic peptide (BNP) level [R79.89]  Supratherapeutic INR [R79.1]  Supratherapeutic international normalized ratio (INR) [R79.1]  Pneumonia due to infectious organism, unspecified laterality, unspecified part of lung [J18.9]  Admit Date:  9/11/2021  Precautions/Restrictions/WB Status/ Lines/ Wounds/ Oxygen: Fall risk, Bed/chair alarm, Lines -Valentine catheter, Telemetry and Continuous pulse oximetry    Treatment Time:  6413-9272  Treatment Number:  1   Timed Code Treatment Minutes: 55 minutes  Total Treatment Minutes:  65  minutes    Patient Goals for Therapy: \" to sit up in a chair \"          Discharge Recommendations: SNF  DME needs for discharge: defer to facility       Therapy recommendation for EMS Transport: requires transport by cot due to need for lift equipment to safely transfer    Therapy recommendations for staff:   Assist of 2 with use of [address] for all transfers to/from [facility]  to/from [facility]    History of Present Illness: Per H&P Dr. Vishal Sotelo 9/11: \"79 y.o. male who presented to [facility] with past medical history of A. fib on Coumadin, HFpEF, type 2 diabetes, hypertension, hyperlipidemia, BELKYS on BiPAP presented to the ED for worsening fatigue unintentional weight loss and inability ambulate.     Patient reported he has lost almost 10 to 20 pounds unintentionally from previous. Patient in the picture in the chart looks much more skinnier malnourished anorexic. Patient reported that he has been having appropriate appetite eating and drinking and has been having increased cough no phlegm but does report shortness of breath on exertion. Alleviated with rest.  Patient reports that he was seen his primary care and was reported that he needs to be follow-up with GI and heme-onc.   Patient's daughter is a pediatrician physician and I also spoke on the phone to discuss care. Patient does not currently smoke drink however does have extensive 20+ pack years of smoking in addition to drinking. Patient also more than 20 years from the time where he needs a colonoscopy. Patient also reports been having intermittent diarrhea and constipation no known alleviating exacerbating factor as well. No reported bleeding. Patient also reports that he has lower extremity edema that is progressive despite putting Ace wrap in addition has a lesion on the right foot which the patient does not take care of nor follow-up. CODE STATUS discussed and the patient is a full code. \"    Home Health S4 Level Recommendation:  NA  AM-PAC Mobility Score    AM-PAC Inpatient Mobility Raw Score : 9  AM-PAC Inpatient Mobility without Stair Climbing Raw Score : 8    Preadmission Environment    Pt. Lives with spouse  Home environment:  one story home  Steps to enter first floor: 3 steps to enter and hand rail unilateral; ramp but pt does not use  Steps to second floor: N/A  Bathroom: tub/shower unit; handheld shower; uses BSC as a shower chair. Also uses BSC over the toilet. Equipment owned: GALL[employer]O, [facility], quad cane and has access to a wheelchair from family member; BiPap. Lift chair will be delivered next week. Sleeps in a flat bed. Has been indep with bed mobility until the last few days prior to admission. Preadmission Status:  Pt. Able to drive: No (wife drives)  Pt Fully independent with ADLs: Yes (assistance from wife to get in/out of shower, getting shoes on/off, more recently - LB dressing). Pt. Required assistance from family for: Bathing, Cleaning, Cooking, Dressing and Laundry   Pt. independent for transfers and gait and walked with Emi Lennox and ThenetZentry Corpus (progressively more so with walker). Wife helps advance LE when going up/down stairs to get into house. Wife and pt report mobility has been dramatically more challenging in last few days.    History of falls Yes - fell with wife while she was helping him to the bathroom 2 days ago. Baseline LE edema. Pain   Yes  Location: bilat LE's  Rating: moderate /10 \"semi-numb but that is brenda normal\"  Pain Medicine Status: No request made    Cognition    A&O x4   Able to follow 2 step commands    Subjective  Patient lying semifowlers in bed with spouse present. Pt agreeable to this PT eval & tx. Upper Extremity ROM/Strength  Please see OT evaluation. Lower Extremity ROM / Strength   AROM WFL: No  ROM limitations: Ankle ROM grossly limited ~50% due to edema. Knee/hip ROM appear WFL in gravity-eliminated position only. Strength Assessment (measured on a 0-5 scale):  R LE   Quad   3   Ant Tib  2   Hamstring 2   Iliopsoas 2  L LE  Quad   3   Ant Tib  2   Hamstring 2   Iliopsoas 2    Lower Extremity Sensation    Impaired - lacking sensation to deep pressure bilat feet all dermatomes. Diminished sensation bilat lateral knees. Lower Extremity Proprioception:   NT    Coordination and Tone  NT    Balance  Sitting:  Fair +; Max A initially sitting EOB for ~3 minutes, progressing to Supervision. Comments: Pt sat EOB ~20 minutes. Able to sit statically without UE support. Requires 1-2 UE support with dynamic UE or LE mobility. Standing: Fair -; CGA once fully standing in [place]   Comments: Knees/hips remain mildly flexed; LEs are shaky. Moderate UE support on Stedy. Pt tolerated 2 stands of ~20 seconds each, limited by fatigue. Therapists provided total A to pull up brief from mid-thigh level while pt in STEDY with BUE support. Bed Mobility   Supine to Sit:    Mod A  and 2 persons  Sit to Supine:   Not Tested  Rolling:   Not Tested  Scooting in sitting: Not Tested  Scooting in supine:  Not Tested    Transfer Training     Sit to stand: Total A (Mod A x 2 with STEDY from EOB and recliner)  Stand to sit:   Total A (Min A x 2 with STEDY to recliner x 2 trials) - poor eccentric control.   Bed to Chair:   Total A with use of ANTONIA STEDY    Gait gait deferred due to difficulty with transfers; pt ambulated 0 ft. Distance:      NA ft  Deviations (firm surface/linoleum):  N/A  Assistive Device Used:    N/A  Level of Assist:    N/A  Comment: NA    Stair Training deferred, pt unsafe/ not appropriate to complete stairs at this time    Activity Tolerance   Pt completed therapy session with No adverse symptoms noted w/activity    Positioning Needs   Pt up in chair, alarm set, positioned in proper neutral alignment and pressure relief provided. Call light provided and all needs within reach    Exercises Initiated  Estrella deferred secondary to treatment focus on functional mobility  NA    Other  None. Patient/Family Education   Pt educated on role of inpatient PT, POC, importance of continued activity, transfer techniques and calling for assist with mobility. Assessment  Pt seen for Physical Therapy evaluation in acute care setting. Pt demonstrated decreased Activity tolerance, Balance, ROM, Safety and Strength as well as decreased independence with Ambulation, Bed Mobility  and Transfers. Upon examination pt demos poor BLE strength, thus requiring ~Mod assist x 2 for bed mobility and transfers with the STEDY. Pt will benefit from skilled PT in acute setting to promote activity tolerance and independent functional mobility. Recommending SNF upon discharge as patient functioning well below baseline, demonstrates good rehab potential and unable to return home due to inability to negotiate stairs to enter home/bedroom/bathroom, burden of care beyond caregiver ability and home environment not conducive to patient recovery. Goals : To be met in 3 visits:  1). Independent with LE Ex x 10 reps    To be met in 6 visits:  1). Supine to/from sit: Supervision  2). Sit to/from stand: SBA  3). Bed to chair: SBA  4). Gait: Establish goal after trial.  5).   Tolerate B LE exercises 3 sets of 10-15 reps    Rehabilitation Potential: Good  Strengths for achieving goals include:   Pt motivated, PLOF, Family Support and Pt cooperative   Barriers to achieving goals include:    Complexity of condition    Plan    To be seen 3-5 x / week  while in acute care setting for therapeutic exercises, bed mobility, transfers, progressive gait training, balance training, and family/patient education. Signature: Jacky Flower, PT, DPT    If patient discharges from this facility prior to next visit, this note will serve as the Discharge Summary.

## 2021-09-13 NOTE — PROGRESS NOTES
pt last PT/ INR was 166.4 and 13.22 down from 170.0 and 14.27 .  Perfect serve sent to  [facility]

## 2021-09-13 NOTE — PROGRESS NOTES
Hospitalist Progress Note      PCP: Shirin Lai MD    Date of Admission: 9/11/2021    Hospital Course:   64-year-old gentleman with history of fibrillation, chronic congestive heart failure, diabetes type 2, history of smoking and alcohol use which is remote admitted here with bilateral hydronephrosis and possible pneumonia. Patient with multiple issues-  presented with profound weakness and fatigue. .  Patient has been losing weight. There has been concern about possible malignancy  Blood sugars been very low      Subjective:   Laying in bed. appears very weak and cachectic  Has some chronic pain due to laying in bed. Patient does have diffuse LE  edema  He is awake alert and oriented times 3.   looks extremely cachectic   INR still elevated over 14       Seen by heme-onc for leukopenia, urology consult pending for obstructive uropathy, seen by GI for constipation.  .  Needs PT OT eval  And seen by cardiology      Medications:  Reviewed    Infusion Medications    dextrose      sodium chloride      dextrose       Scheduled Medications    potassium chloride  40 mEq Oral Once    polyethylene glycol  17 g Oral Daily    guaiFENesin  400 mg Oral BID    atorvastatin  20 mg Oral Nightly    tamsulosin  0.4 mg Oral Daily    docusate sodium  200 mg Oral BID    bisacodyl  10 mg Rectal Once    doxycycline (VIBRAMYCIN) IV  100 mg IntraVENous Q12H    cefTRIAXone (ROCEPHIN) IV  1,000 mg IntraVENous Q24H    sodium chloride flush  10 mL IntraVENous 2 times per day     PRN Meds: glucose, dextrose, glucagon (rDNA), dextrose, sennosides-docusate sodium, ipratropium-albuterol, sodium chloride flush, sodium chloride, potassium chloride, magnesium sulfate, promethazine **OR** ondansetron, acetaminophen **OR** acetaminophen, dextrose      Intake/Output Summary (Last 24 hours) at 9/13/2021 1150  Last data filed at 9/13/2021 0432  Gross per 24 hour   Intake --   Output 900 ml   Net -900 ml       Physical Exam Performed:    /74   Pulse 57   Temp 96.1 °F (35.6 °C) (Axillary)   Resp 16   Ht 5' 9\" (1.753 m)   Wt 136 lb 4.8 oz (61.8 kg)   SpO2 94%   BMI 20.13 kg/m²     General appearance:   Patient appears malnourished  He is in no distress. he is awake alert and well-oriented   appears very cachectic  HEENT: Pupils equal, round, and reactive to light. Conjunctivae/corneas clear. Neck: Supple, with full range of motion. No jugular venous distention. Trachea midline. Respiratory:  Normal respiratory effort. Clear to auscultation, bilaterally without Rales/Wheezes/Rhonchi. Cardiovascular: Irregularly irregular  Abdomen: Soft, non-tender, non-distended with normal bowel sounds. Musculoskeletal: No clubbing, cyanosis . +1 edema left lower extremity more than right . Full range of motion without deformity. Skin: Chronic bilateral lower extremity wounds  Neurologic:  Neurovascularly intact without any focal sensory/motor deficits. Cranial nerves: II-XII intact, grossly non-focal.  Psychiatric: Alert and oriented    Labs:   Recent Labs     09/12/21  0532 09/12/21  0532 09/12/21  0851 09/12/21 1821 09/13/21 0613   WBC 2.1*  --   --  6.3 7.8   HGB 12.9*  --   --  12.9* 13.0*   HCT 39.4*   < > 35.3* 40.2* 39.1*   *  --   --  116* 103*    < > = values in this interval not displayed.      Recent Labs     09/11/21  1535 09/12/21  0532 09/13/21 0613    143 141   K 4.2 3.1* 3.2*    105 103   CO2 28 26 28   BUN 53* 51* 41*   CREATININE 0.7* 0.6* 0.6*   CALCIUM 9.1 8.7 8.9   PHOS 3.5  --   --      Recent Labs     09/11/21  1535 09/12/21  0532 09/13/21  0613   * 103* 149*   * 135* 158*   BILITOT 0.8 0.7 1.0   ALKPHOS 198* 172* 218*     Recent Labs     09/12/21  0532 09/12/21  1821 09/13/21  0628   INR >14.27* 13.22* >14.27*     Recent Labs     09/11/21  1535   TROPONINI <0.01       Urinalysis:      Lab Results   Component Value Date    NITRU Negative 09/12/2021    WBCUA 0-2 09/12/2021 RBCUA 11-20 09/12/2021    BLOODU SMALL 09/12/2021    SPECGRAV 1.015 09/12/2021    GLUCOSEU Negative 09/12/2021       Radiology:  CT CHEST ABDOMEN PELVIS W CONTRAST   Final Result   Multifocal pneumonia, greatest in the left lower lobe, new compared to   09/03/2021. There is a background emphysema and severe coronary artery   disease. Filling defects are seen in the left lower lobe, suggesting   sequelae from mucous plugging or aspiration. Bilateral hydronephrosis, likely secondary to markedly distended bladder. Left-sided inguinal hernia containing colon. No resultant bowel obstruction. CT HEAD WO CONTRAST   Final Result   No acute traumatic intracranial abnormality      Atrophy and small-vessel ischemic change      Probable calcified meningioma on the right. XR CHEST PORTABLE   Final Result   Left lower lobe pneumonia                 Assessment/Plan:    Multifocal pneumonia  Possible gram-negative infection  Patient has bronchiectasis  History of COPD  - on IV antibiotic -on Rocephin and doxycycline  -Continue HHN K treatment    Obstructive uropathy  - Valentine catheter inserted  -Urology consult pending   -Continue Flomax    Constipation  - Started on on stool regimen  -Seen by GI ->recommendation was for endoscopic evaluation    Supratherapeutic INR  -INR over 14  Vitamin K 5 mg p.o. daily given for the last 2 days, we will give 10 mg p.o. vitamin K today. no active bleed   INR remains elevated.   Likely related to poor nutritional status    A. fib   on chronic Coumadin-hold as above  Been on verapamil -> held due to bradycardia  Check echocardiogram  TSH pending    Hypokalemia  - replete    Hypoglycemia,   has lost a lot of weight  Pending  cortisol level in the morning    Severe malnutrition  -Encourage nutritional supplements    Patient with severe weight loss  -Needs endoscopic evaluation    Profound weakness and fatigue   -consult PT OT    Chronic bilateral lower extremity wounds  - will consult wound care    Leukopenia  - heme-onc consulted  - likely related to pneumonia   monitor white count      COVID-19 ruled out . patient has been vaccinated. Rapid Covid test was negative    DVT Prophylaxis:on warf, INR elevated  Diet: ADULT DIET;  Regular  Code Status: Full Code      Dispo - ms Elenita Hensley MD    9/13/2021

## 2021-09-13 NOTE — CONSULTS
[facility] Wound Ostomy Continence Nurse  Consult Note       NAME:  Cl Renee  MEDICAL RECORD NUMBER:  [id number]  AGE: 80 y.o. GENDER: male  : 1950  TODAY'S DATE:  2021    Subjective Pt states \"my wife will be here and she will change the dressing. My blood sugar is low right now\"   Reason for WOCN Evaluation and Assessment: RLE      Cl Renee is a 80 y.o. male referred by:   [x] Physician  [] Nursing  [] Other:     Wound Identification:  Wound Type: venous  Contributing Factors: edema and lymphedema , venous stasis    Pt seen for wound to achilles on the right posterior LE. Pt states blister formed due to edema and and began leaking copious amounts of fluid. Spoke with wife on phone, area developed a couple of weeks ago and she has been treating with nonadherent dressings, roll gauze and aces. Pt complains that aces do not stay in place. Pt agreeable to Hugh Love changing dressing to visualize wound.       Patient Goal of Care:  [x] Wound Healing  [] Odor Control  [] Palliative Care  [] Pain Control   [] Other:         PAST MEDICAL HISTORY        Diagnosis Date    Atrial fibrillation ([address]     CHF (congestive heart failure) ([address]     Diabetes mellitus ([address]     Hyperlipidemia     Hypertension     BELKYS treated with BiPAP        PAST SURGICAL HISTORY    Past Surgical History:   Procedure Laterality Date    CATARACT REMOVAL WITH IMPLANT Left 2018    EYE SURGERY  12/1/15    right eye cataract    HERNIA REPAIR         FAMILY HISTORY    Family History   Problem Relation Age of Onset    Emphysema Father     Cancer Father        SOCIAL HISTORY    Social History     Tobacco Use    Smoking status: Former Smoker     Quit date: 3/1/2005     Years since quittin.5    Smokeless tobacco: Never Used   Substance Use Topics    Alcohol use: No     Alcohol/week: 0.0 standard drinks    Drug use: No       ALLERGIES    No Known Allergies    MEDICATIONS    No current facility-administered medications on file prior to encounter. Current Outpatient Medications on File Prior to Encounter   Medication Sig Dispense Refill    tamsulosin (FLOMAX) 0.4 MG capsule Take 0.4 mg by mouth      furosemide (LASIX) 40 MG tablet Take 1 tablet by mouth 2 times daily 60 tablet 3    atorvastatin (LIPITOR) 20 MG tablet Take 20 mg by mouth nightly      verapamil (CALAN-SR) 180 MG CR tablet Take 180 mg by mouth 2 times daily.  benazepril (LOTENSIN) 40 MG tablet Take 40 mg by mouth daily.  warfarin (COUMADIN) 10 MG tablet Take 10 mg by mouth daily.       docusate sodium (COLACE) 100 MG capsule Take 100 mg by mouth every other day          Objective    /74   Pulse 57   Temp 96.1 °F (35.6 °C) (Axillary)   Resp 16   Ht 5' 9\" (1.753 m)   Wt 136 lb 4.8 oz (61.8 kg)   SpO2 94%   BMI 20.13 kg/m²     LABS:  WBC:    Lab Results   Component Value Date    WBC 7.8 09/13/2021     H/H:    Lab Results   Component Value Date    HGB 13.0 09/13/2021    HCT 39.1 09/13/2021     PTT:    Lab Results   Component Value Date    APTT 87.8 09/12/2021   [APTT}  PT/INR:    Lab Results   Component Value Date    PROTIME >170.0 09/13/2021    PROTIME 19.6 12/30/2009    INR >14.27 09/13/2021     HgBA1c:    Lab Results   Component Value Date    LABA1C 5.3 05/19/2018       Assessment   Tanner Risk Score: Tanner Scale Score: 18    Patient Active Problem List   Diagnosis Code    Atrial fibrillation (HCC) I48.91    Diabetes mellitus ([facility] E11.9    Hyperlipidemia E78.5    Urinary retention R33.9    Sleep apnea G47.30    Morbid obesity with BMI of 40.0-44.9, adult ([facility] E66.01, Z68.41    Prolonged pt (prothrombin time) R79.1    Snoring R06.83    Dyspnea R06.00    Chronic atrial fibrillation (HCC) I48.20    Essential hypertension I10    BELKYS treated with BiPAP G47.33    Nocturnal hypoxemia G47.34    Supratherapeutic INR R79.1    General weakness R53.1         Right achilles:  partial thickness skin loss, ruptured bulla, 100% pink, moist tissue. No drainage noted at this time. No soi. Pt states skin is sensitive on both legs, left worse than right. Hemosiderin staining present bilaterally. Edema in BLE, especially foot, ankles to mid calf. Response to treatment:  Well tolerated by patient. Pain Assessment:  Severity:  0 / 10  Quality of pain: N/A  Wound Pain Timing/Severity: none  Premedicated: N/A    Plan  Right posterior LE:  [place] wound with NS, pat dry. Apply Xeroform gauze, cover with ABD pad, then wrap BLE with roll gauze from toes to knee gatch. Apply Dermafit stocking to BLE from toes to knee gatch. Change daily. Plan of Care:      Specialty Bed Required : N/A   [] Low Air Loss   [] Pressure Redistribution  [] Fluid Immersion  [] Bariatric  [] Total Pressure Relief  [] Other:     Current Diet: ADULT DIET;  Regular  Dietician consult:  Yes    Discharge Plan:  Placement for patient upon discharge: home with support    Patient appropriate for Outpatient [address]: Yes    Referrals:  []   [] [address] [place] [address]  [x] Supplies  [] Other    Patient/Caregiver Teaching:  Level of patient/caregiver understanding able to:   [] Indicates understanding       [] Needs reinforcement  [] Unsuccessful      [x] Verbal Understanding  [] Demonstrated understanding       [] No evidence of learning  [] Refused teaching         [] N/A       Electronically signed by Kami Rosenbaum RN, CWOCN on 9/13/2021 at 1:14 PM

## 2021-09-14 NOTE — PROGRESS NOTES
PROGRESS NOTE  S:71 yrs Patient  admitted on 9/11/2021 with General weakness [R53.1]  Elevated brain natriuretic peptide (BNP) level [R79.89]  Supratherapeutic INR [R79.1]  Supratherapeutic international normalized ratio (INR) [R79.1]  Pneumonia due to infectious organism, unspecified laterality, unspecified part of lung [J18.9] . Today he feels well. He is passing gas, but has not passed BM since Saturday. Exam:   Vitals:    09/14/21 1416   BP: 97/65   Pulse: 86   Resp: 16   Temp: 97.1 °F (36.2 °C)   SpO2:       General appearance: alert, appears older than stated age, cooperative, no distress and syndromic appearance - chronically ill appearing  HEENT: Neck supple with midline trachea  Neck: no adenopathy and supple, symmetrical, trachea midline  Lungs: clear to auscultation bilaterally  Heart: regular rate and rhythm, S1, S2 normal, no murmur, click, rub or gallop  Abdomen: soft, non-tender; bowel sounds normal; no masses,  no organomegaly  Extremities: extremities normal, atraumatic, no cyanosis or edema     Medications: Reviewed    Labs:  CBC:   Recent Labs     09/12/21  1821 09/13/21  0613 09/14/21  0610   WBC 6.3 7.8 10.2   HGB 12.9* 13.0* 11.9*   HCT 40.2* 39.1* 36.9*   MCV 96.8 96.0 99.3   * 103* 85*     BMP:   Recent Labs     09/11/21  1535 09/11/21  1535 09/12/21  0532 09/13/21  0613 09/14/21  0610      < > 143 141 138   K 4.2   < > 3.1* 3.2* 4.0      < > 105 103 102   CO2 28   < > 26 28 31   PHOS 3.5  --   --   --   --    BUN 53*   < > 51* 41* 35*   CREATININE 0.7*   < > 0.6* 0.6* <0.5*    < > = values in this interval not displayed.      LIVER PROFILE:   Recent Labs     09/12/21  0532 09/12/21  0532 09/12/21  0850 09/13/21  0613 09/14/21  0610   *  --   --  149* 172*   *  --   --  158* 202*   PROT 5.4*   < > 4.7* 5.5* 4.4*   BILITOT 0.7  --   --  1.0 1.0   ALKPHOS 172*  --   --  218* 316*    < > = values in this interval not

## 2021-09-14 NOTE — PROGRESS NOTES
Bedside report given to Cherry Grossman  PCA providing pt with additional OJ pt in stable condition no needs at this time.  Call light within reach

## 2021-09-14 NOTE — PROGRESS NOTES
Inpatient Physical Therapy Daily Treatment Note    Unit: [address]  Date:  9/14/2021  Patient Name:    Charlie Vazquez  Admitting diagnosis:  General weakness [R53.1]  Elevated brain natriuretic peptide (BNP) level [R79.89]  Supratherapeutic INR [R79.1]  Supratherapeutic international normalized ratio (INR) [R79.1]  Pneumonia due to infectious organism, unspecified laterality, unspecified part of lung [J18.9]  Admit Date:  9/11/2021  Precautions/Restrictions/WB Status/ Lines/ Wounds/ Oxygen: Fall risk, Bed/chair alarm, Lines -Valentine catheter, Telemetry and Continuous pulse oximetry    Treatment Time:  3118-5230  Treatment Number:  2  Timed Code Treatment Minutes: 50 minutes  Total Treatment Minutes:  50 minutes    Patient Goals for Therapy: \" to sit up in a chair \"          Discharge Recommendations: SNF  DME needs for discharge: defer to facility       Therapy recommendation for EMS Transport: requires transport by cot due to need for lift equipment to safely transfer    Therapy recommendations for staff:   Assist of 2 with use of [address] for all transfers to/from [facility]  to/from [facility]    History of Present Illness: Per H&P Dr. Lia Nava 9/11: \"79 y.o. male who presented to [facility] with past medical history of A. fib on Coumadin, HFpEF, type 2 diabetes, hypertension, hyperlipidemia, BELKYS on BiPAP presented to the ED for worsening fatigue unintentional weight loss and inability ambulate.     Patient reported he has lost almost 10 to 20 pounds unintentionally from previous. Patient in the picture in the chart looks much more skinnier malnourished anorexic. Patient reported that he has been having appropriate appetite eating and drinking and has been having increased cough no phlegm but does report shortness of breath on exertion. Alleviated with rest.  Patient reports that he was seen his primary care and was reported that he needs to be follow-up with GI and heme-onc.   Patient's daughter is a pediatrician physician and I also spoke on the phone to discuss care. Patient does not currently smoke drink however does have extensive 20+ pack years of smoking in addition to drinking. Patient also more than 20 years from the time where he needs a colonoscopy. Patient also reports been having intermittent diarrhea and constipation no known alleviating exacerbating factor as well. No reported bleeding. Patient also reports that he has lower extremity edema that is progressive despite putting Ace wrap in addition has a lesion on the right foot which the patient does not take care of nor follow-up. CODE STATUS discussed and the patient is a full code. \"    Home Health S4 Level Recommendation:  NA  AM-PAC Mobility Score    AM-PAC Inpatient Mobility Raw Score : 10  AM-PAC Inpatient Mobility without Stair Climbing Raw Score : 9    Pain   Yes  Location: [facility]  Rating: mild /10   Pain Medicine Status: No request made    Cognition    A&O x4   Able to follow 2 step commands    Subjective  Patient lying semifowlers in bed with spouse present. Speaking with RN. Pt agreeable to this PT eval & tx. Initially questioning of getting up to the chair but agreeable with light encouragement from spouse. Balance  Sitting:  Good - ; Supervision at EOB  Comments: Flexed posture. Standing: Fair -; Min A  and 2 persons static balance with RW   Comments: Shaky BLE, heavy lean onto BUE with walker for support. Bed Mobility   Supine to Sit:    Mod A   Sit to Supine:   Not Tested  Rolling:   Not Tested  Scooting in sitting: SBA with cues for technique  Scooting in supine:  Not Tested     Transfer Training     Sit to stand: Mod A  and 2 persons from EOB with SW. Max A to position LEs. Stand to sit:   Min A  and 2 persons   Bed to Chair:  Min A and 2 persons with use of standard walker (SW). Stand pivot. Cues for sequencing; Max A to advance walker. Heavy use of UEs for support.     Gait gait deferred due to difficulty with transfers; pt ambulated 0 ft. Distance:      NA ft  Deviations (firm surface/linoleum):  N/A  Assistive Device Used:    N/A  Level of Assist:    N/A  Comment: NA    Stair Training deferred, pt unsafe/ not appropriate to complete stairs at this time    Activity Tolerance   Pt completed therapy session with No adverse symptoms noted w/activity    Positioning Needs   Pt up in chair, alarm set, positioned in proper neutral alignment and pressure relief provided. Call light provided and all needs within reach    Exercises Initiated  Estrella deferred secondary to treatment focus on functional mobility  NA    Other  None. Patient/Family Education   Pt educated on role of inpatient PT, POC, importance of continued activity, transfer techniques and calling for assist with mobility. Assessment  Pt seen for Physical Therapy follow up treatment. Pt requires Min/Mod A for bed mobility and transfers secondary to LE weakness. Pt will benefit from skilled PT in acute and post-acute setting to promote functional strength for improved functional mobility with reduced level of assist and reduced fall risk. Pt had one fall at home leading to this admission despite close assistance from wife. Pt and family report that they will have 24/7 assist from son-in-law at home. Recommending SNF upon discharge as patient functioning well below baseline, demonstrates good rehab potential and unable to return home due to inability to negotiate stairs to enter home/bedroom/bathroom, burden of care beyond caregiver ability and home environment not conducive to patient recovery. Goals : To be met in 3 visits:  1). Independent with LE Ex x 10 reps    To be met in 6 visits:  1). Supine to/from sit: Supervision  2). Sit to/from stand: SBA  3). Bed to chair: SBA  4). Gait: Establish goal after trial.  5).   Tolerate B LE exercises 3 sets of 10-15 reps    Rehabilitation Potential: Good  Strengths for achieving goals include:   Pt motivated, PLOF, Family Support and Pt cooperative   Barriers to achieving goals include:    Complexity of condition    Plan    To be seen 3-5 x / week  while in acute care setting for therapeutic exercises, bed mobility, transfers, progressive gait training, balance training, and family/patient education. Signature: Ollie Apgar, PT, DPT    If patient discharges from this facility prior to next visit, this note will serve as the Discharge Summary.

## 2021-09-14 NOTE — PROGRESS NOTES
Bedside Mobility Assessment Tool (BMAT):     Assessment Level 1- Sit and Shake    1. From a semi-reclined position, ask patient to sit up and rotate to a seated position at the side of the bed. Can use the bedrail. 2. Ask patient to reach out and grab your hand and shake making sure patient reaches across his/her midline. Pass- Patient is able to come to a seated position, maintain core strength. Maintains seated balance while reaching across midline. Move on to Assessment Level 2. Assessment Level 2- Stretch and Point   1. With patient in seated position at the side of the bed, have patient place both feet on the floor (or stool) with knees no higher than hips. 2. Ask patient to stretch one leg and straighten the knee, then bend the ankle/flex and point the toes. If appropriate, repeat with the other leg. Pass- Patient is able to demonstrate appropriate quad strength on intended weight bearing limb(s). Move onto Assessment Level 3. Assessment Level 3- Stand   1. Ask patient to elevate off the bed or chair (seated to standing) using an assistive device (cane, bedrail). 2. Patient should be able to raise buttocks off be and hold for a count of five. May repeat once. Fail- Patient unable to demonstrate standing stability. Patient is MOBILITY LEVEL 3. Assessment Level 4- Walk   1. Ask patient to march in place at bedside. 2. Then ask patient to advance step and return each foot. Some medical conditions may render a patient from stepping backwards, use your best clinical judgement. Fail- Patient not able to complete tasks OR requires use of assistive device. Patient is MOBILITY LEVEL 3.        Mobility Level- 2

## 2021-09-14 NOTE — PLAN OF CARE
Problem: Skin Integrity:  Goal: Will show no infection signs and symptoms  Description: Will show no infection signs and symptoms  9/14/2021 0929 by Betty Aguilera RN  Outcome: Ongoing  9/14/2021 0310 by Sahra Zhu RN  Outcome: Ongoing  Goal: Absence of new skin breakdown  Description: Absence of new skin breakdown  9/14/2021 0929 by Betty Aguilera RN  Outcome: Ongoing  9/14/2021 0310 by Sahra Zhu RN  Outcome: Ongoing     Problem: Falls - Risk of:  Goal: Will remain free from falls  Description: Will remain free from falls  9/14/2021 0929 by Betty Aguilera RN  Outcome: Ongoing  9/14/2021 0310 by Sahra Zhu RN  Outcome: Ongoing  Goal: Absence of physical injury  Description: Absence of physical injury  Outcome: Ongoing     Problem: Bleeding:  Goal: Will show no signs and symptoms of excessive bleeding  Description: Will show no signs and symptoms of excessive bleeding  Outcome: Ongoing     Problem: Nutrition  Goal: Optimal nutrition therapy  Outcome: Ongoing  Goal: Understanding of nutritional guidelines  Outcome: Ongoing

## 2021-09-14 NOTE — PLAN OF CARE
Problem: Skin Integrity:  Goal: Will show no infection signs and symptoms  Description: Will show no infection signs and symptoms  Outcome: Ongoing  Goal: Absence of new skin breakdown  Description: Absence of new skin breakdown  Outcome: Ongoing     Problem: Falls - Risk of:  Goal: Will remain free from falls  Description: Will remain free from falls  Outcome: Ongoing     Problem: Nutrition  Goal: Optimal nutrition therapy  9/13/2021 1740 by Palma Mtz RD, LD  Outcome: Ongoing  Goal: Understanding of nutritional guidelines  9/13/2021 1740 by Palma Mtz RD, LD  Outcome: Ongoing

## 2021-09-14 NOTE — PROGRESS NOTES
Pt blood sugar 42 Pt A/O provided with OJ recheck pt is 54 pt provided with more OJ and peanut butter PCA aware to recheck

## 2021-09-14 NOTE — CONSULTS
Patient:  Ulysses Hose  YOB: 1950   CSN:  [id number]    Primary Care Provider:  Jasper Mckeon MD       Urology Attending Consult Note     Reason for Consultation:  Urinary retention    Chief Complaint: \"I could not empty my bladder\". History from EMR and nursing staff as well    HPI:  Bartley Lennox is a 80 y.o. male who presented with weakness, unintentional weight loss 20lbs, found acute urinary retention. Aguayo catheter placed by nursing team.      A few yrs ago he was seen by urology Narcisa Peck MD) for scrotal edema and possible hydrocele and did have a aguayo catheter for a period of time.     Also, C/o diffuse leg edema and BM issues as below    PSA 0.06  Retention causing some mild bilateral hydronephrosis on ct  miralax given to aid in BM but no BM in at least 3-4 days per pt/wife      Past Medical History:   Diagnosis Date    Atrial fibrillation ([address]     [facility]     Diabetes mellitus [facility]     Hyperlipidemia     Hypertension     BELKYS treated with BiPAP     Pneumonia due to infectious organism        Past Surgical History:   Procedure Laterality Date    CATARACT REMOVAL WITH IMPLANT Left 03/06/2018    EYE SURGERY  12/1/15    right eye cataract    HERNIA REPAIR         Medication List reviewed:     Current Facility-Administered Medications   Medication Dose Route Frequency Provider Last Rate Last Admin    polyethylene glycol (GLYCOLAX) packet 17 g  17 g Oral BID Baron Alonso MD        tamsulosin (FLOMAX) capsule 0.4 mg  0.4 mg Oral Daily MALACHI Sow - CNP   0.4 mg at 09/14/21 0844    glucose (GLUTOSE) 40 % oral gel 15 g  15 g Oral PRN Ahmad A Terra, DO   15 g at 09/13/21 1209    dextrose 50 % IV solution  12.5 g IntraVENous PRN Ahmad A Terra, DO   12.5 g at 09/12/21 0714    glucagon (rDNA) injection 1 mg  1 mg IntraMUSCular PRN Jfd SANDRA DuncanTerra, DO        dextrose 5 % solution  100 mL/hr IntraVENous PRN Marc Moran DO        sennosides-docusate sodium (SENOKOT-S) 8.6-50 MG tablet 2 tablet  2 tablet Oral Daily PRN Anne Painter MD        ipratropium-albuterol (DUONEB) nebulizer solution 1 ampule  1 ampule Inhalation Q4H PRN Jeremy Adamson MD        guaiFENesin (ROBITUSSIN) 100 MG/5ML oral solution 400 mg  400 mg Oral BID Anne Painter MD   400 mg at 09/14/21 0842    docusate sodium (COLACE) capsule 200 mg  200 mg Oral BID evelia Ellison DO   200 mg at 09/14/21 0845    bisacodyl (DULCOLAX) suppository 10 mg  10 mg Rectal Once Mohsen Ellison DO        doxycycline (VIBRAMYCIN) 100 mg in dextrose 5 % 100 mL IVPB  100 mg IntraVENous Q12H Thony Ellison DO   Stopped at 09/14/21 1441    cefTRIAXone (ROCEPHIN) 1000 mg IVPB in 50 mL D5W minibag  1,000 mg IntraVENous Q24H Thony Ellison DO   Stopped at 09/14/21 1852    sodium chloride flush 0.9 % injection 10 mL  10 mL IntraVENous 2 times per day Mohsen Ellison DO   10 mL at 09/14/21 0843    sodium chloride flush 0.9 % injection 10 mL  10 mL IntraVENous PRN Mohsen Ellison DO        0.9 % sodium chloride infusion  25 mL IntraVENous PRN Mohsen Ellison  mL/hr at 09/14/21 1821 25 mL at 09/14/21 1821    potassium chloride 10 mEq/100 mL IVPB (Peripheral Line)  10 mEq IntraVENous PRN Mohsen Ellison, DO        magnesium sulfate 2000 mg in 50 mL IVPB premix  2,000 mg IntraVENous PRN Mohsen Ellison, DO        promethazine (PHENERGAN) tablet 12.5 mg  12.5 mg Oral Q6H PRN Thony Ellison DO        Or    ondansetron (ZOFRAN) injection 4 mg  4 mg IntraVENous Q6H PRN Mohsen Ellison, DO        acetaminophen (TYLENOL) tablet 650 mg  650 mg Oral Q6H PRN Ahmad A Terra, DO        Or    acetaminophen (TYLENOL) suppository 650 mg  650 mg Rectal Q6H PRN Ahmad A Terra, DO        dextrose 5 % solution  100 mL/hr IntraVENous PRN Ahmad A Terra, DO           No Known Allergies    Family History   Problem Relation Age of Onset    Emphysema Father     Cancer Father Social History     Tobacco Use    Smoking status: Former Smoker     Quit date: 3/1/2005     Years since quittin.5    Smokeless tobacco: Never Used   Substance Use Topics    Alcohol use: No     Alcohol/week: 0.0 standard drinks    Drug use: No         Review of Systems: A 12 point ROS was performed and was unremarkable unless listed in the history of present illness. I/O last 3 completed shifts:   In: 360 [P.O.:360]  Out: 1450 [Urine:1450]    Physical Exam:  Patient Vitals for the past 8 hrs:   BP Temp Temp src Pulse Resp   21 1416 97/65 97.1 °F (36.2 °C) Oral 86 16     Constitutional: pleasant male, cachetic appearing  in NAD   EENT:Neck: no masses or lesions, trachea midline   Respiratory: normal respiratory movements without distress   Cardiovascular: regular rate and rhythm, lower extremities 2+edema   Abdomen: soft, NTND, no guarding  Back: no CVAT, no abnormal curvature of the spine  Skin: warm and dry, no rashes, lesions, or ulcers  Musculoskeletal: normal ROM, m. tone, no digital cyanosis, head normocephalic  Psych: normal mood and affect, alert and appropriately answers questions  : stable bladder,  Mild penoscrotal edema,   nontender  PAUL: deferred, in chair unable to perform    Labs:     Lab Results   Component Value Date    PSA 0.06 2021     Lab Results   Component Value Date    PSA 0.06 2021     Recent Labs     21  0610 21  0613 21  1821   WBC 10.2 7.8 6.3   HGB 11.9* 13.0* 12.9*   HCT 36.9* 39.1* 40.2*   MCV 99.3 96.0 96.8   PLT 85* 103* 116*     Lab Results   Component Value Date    LABA1C 5.3 2018    LABA1C 5.6 2017     Lab Results   Component Value Date    GLUF 87 2018    LABMICR YES 2021    LDLCALC 104 (H) 09/10/2020    CREATININE <0.5 (L) 2021     Lab Results   Component Value Date     2021    K 4.0 2021     2021    CO2 31 2021    BUN 35 (H) 2021    CREATININE <0.5 (L) 09/14/2021    GLUCOSE 47 (LL) 09/14/2021    CALCIUM 8.5 09/14/2021    PROT 4.4 (L) 09/14/2021    LABALBU 2.6 (L) 09/14/2021    BILITOT 1.0 09/14/2021    ALKPHOS 316 (H) 09/14/2021     (H) 09/14/2021     (H) 09/14/2021    LABGLOM >60 09/14/2021    GFRAA >60 09/14/2021    AGRATIO 1.4 09/14/2021    GLOB 1.8 09/14/2021     Lab Results   Component Value Date    CREATININE <0.5 (L) 09/14/2021    CREATININE 0.6 (L) 09/13/2021    CREATININE 0.6 (L) 09/12/2021       Lab Results   Component Value Date    COLORU Yellow 09/12/2021    NITRU Negative 09/12/2021    GLUCOSEU Negative 09/12/2021    KETUA Negative 09/12/2021    UROBILINOGEN 0.2 09/12/2021    BILIRUBINUR Negative 09/12/2021        Radiology: \"Imaging was independently reviewed by myself and I agree with the radiology interpretation except as noted above\"  CT abd/pelvis - large amt of stool in rectal vault;   Prostate avg size,  Bilateral hydro with distended bladder (moderate)    Impression/Plan:  Weakness, malnutrition  Bilateral hydronephrosis, likely from retention  Acute urinary retention - likely multifactorial but constipation is not helping  -avoid anti-muscarinic, antihistamine, and alpha-agonist medications as these will exacerbate urinary retention. Recommend minimizing the narcotic pain medication regimen as tolerated.   Increased ambulation/mobility will also usually help with improvement in the degree of urinary retention.   -alpha-blocker therapy with Flomax 0.4mg once qday    -You can attempt a voiding trial prior to discharge or discharge patient with the aguayo and have him follow up in the urology clinic with an early morning visit.    -aggressive bowel regimen  -at some point would benefit from cystoscopy but can likely be done as outpatient        Dioni Grullon MD  Cell: [phone]  Office: [phone]

## 2021-09-14 NOTE — PROGRESS NOTES
ONCOLOGY HEMATOLOGY CARE PROGRESS NOTE      SUBJECTIVE:       INR 1.4 after Vit K. No fever. Getting echo at bedside this AM. Pt asleep. ROS:     10 point ROS completed. Pertinent positives in HPI, otherwise negative. OBJECTIVE        Physical    VITALS:  /63   Pulse 86   Temp 97.2 °F (36.2 °C) (Oral)   Resp 18   Ht 5' 9\" (1.753 m)   Wt 136 lb 4.8 oz (61.8 kg)   SpO2 93%   BMI 20.13 kg/m²   TEMPERATURE:  Current - Temp: 97.2 °F (36.2 °C); Max - Temp  Av.2 °F (36.2 °C)  Min: 96.1 °F (35.6 °C)  Max: 97.9 °F (36.6 °C)  PULSE OXIMETRY RANGE: SpO2  Av.2 %  Min: 91 %  Max: 95 %  24HR INTAKE/OUTPUT:      Intake/Output Summary (Last 24 hours) at 2021 0815  Last data filed at 2021 0418  Gross per 24 hour   Intake 600 ml   Output 850 ml   Net -250 ml       CONSTITUTIONAL: awake, alert, cooperative, no apparent distress ; temporal wasting   EYES: pupils equal, round and reactive to light, sclera clear and conjunctiva normal  ENT: Normocephalic, without obvious abnormality, atraumatic  NECK: supple, symmetrical, no jugular venous distension and no carotid bruits   HEMATOLOGIC/LYMPHATIC: no cervical, supraclavicular or axillary lymphadenopathy   LUNGS: no increased work of breathing and clear to auscultation   CARDIOVASCULAR: regular rate and rhythm, normal S1 and S2, no murmur noted  ABDOMEN: normal bowel sounds x 4, soft, non-distended, non-tender, no masses palpated, no hepatosplenomgaly   MUSCULOSKELETAL: full range of motion noted, tone is normal  NEUROLOGIC: awake, alert, oriented to name, place and time. Motor skills grossly intact.    SKIN: scattered bruising and skin tears to Bilateral forearms   EXTREMITIES: no LE edema       Data      Recent Labs     21  0532 21  0532 21  0851 21  1821 21  0613   WBC 2.1*  --   --  6.3 7.8   HGB 12.9*  --   --  12.9* 13.0*   HCT 39.4*   < > 35.3* 40.2* 39.1*   *  -- --  116* 103*   MCV 96.3  --   --  96.8 96.0    < > = values in this interval not displayed. Recent Labs     09/11/21  1535 09/12/21  0532 09/13/21  0613    143 141   K 4.2 3.1* 3.2*    105 103   CO2 28 26 28   PHOS 3.5  --   --    BUN 53* 51* 41*   CREATININE 0.7* 0.6* 0.6*     Recent Labs     09/11/21  1535 09/12/21  0532 09/13/21  0613   * 103* 149*   * 135* 158*   BILITOT 0.8 0.7 1.0   ALKPHOS 198* 172* 218*       Magnesium:    Lab Results   Component Value Date    MG 1.90 09/13/2021    MG 2.00 09/12/2021    MG 2.20 09/11/2021         Problem List  Patient Active Problem List   Diagnosis    [facility] fibrillation ([facility]    Diabetes mellitus ([facility]    Hyperlipidemia    Urinary retention    Sleep apnea    Morbid obesity with BMI of 40.0-44.9, adult ([facility]    Prolonged pt (prothrombin time)    Snoring    Dyspnea    Chronic atrial fibrillation (HCC)    Essential hypertension    BELKYS treated with BiPAP    Nocturnal hypoxemia    Supratherapeutic INR    General weakness    Supratherapeutic international normalized ratio (INR)    Pneumonia due to infectious organism    Constipation    Weight loss       ASSESSMENT AND PLAN:    1. Leukopenia/TCP     - WBC stable now at 7.8 and Hgb 13 with plt 103k now   - this is new with normal CBC last month - likely from pneumonia  - marrow signal abnl on recent MRI - finding is non-specific  - Hapto is 107, , CRP high at 178, sed rate is 48, RF slightly high at 15, SPEP no bands ; Iron stores are 40, TIBC 102, and sat is normal at 39 percent. Ferritin is grossly high at 2700 ;  Hepatitis is negative, IGG slightly low at 625- likely due to acute infection ; HIV pending, B12 over 2k, Folate is 12 ; LINDY pending, RF slightly high at 1  - request smear  - may need BMBX if worsening  - Flow cytometry is pending with lymphopenia on diff   - WBC improved, Plt remain mildly low   - check hemochromatosis screen with high ferritin but suspect this is reactive     2. Weight Loss/C[place]cexia     - no definite malignancy on CT scan  - bilateral hydronephrosis on CT - urology to see  - GI consulted - last c-scope 20 years ago; EGD and C scope are planned outpatient  - Hgb A1C is pending      3. Pneumonia     - abx per IM    4.  Coagulopathy   - INR over 14 9/13  - suspect from poor nutrition  - Vitamin K 10 mg PO ordered X 1 dose and a mixing study to assess for underlying inhibitor   - INR checks daily ; likely due to profound malnutrition       ONCOLOGIC DISPOSITION: per IM       Simeon Chandler MD

## 2021-09-14 NOTE — FLOWSHEET NOTE
09/13/21 1948   Vital Signs   Temp 97.7 °F (36.5 °C)   Temp Source Oral   Pulse 88   Heart Rate Source Monitor   Resp 18   BP 94/61   BP Location Left upper arm   Patient Position Semi fowlers   Level of Consciousness Alert (0)   MEWS Score 2   Oxygen Therapy   SpO2 91 %   O2 Device None (Room air)   Pt A/O x 4 assessment completed. Meds given per MAR. Pt assisted in brushing teeth. Pt denies any needs. Call light within reach and bed alarm on.

## 2021-09-14 NOTE — PROGRESS NOTES
Hospitalist Progress Note      PCP: Cari Underwood MD    Date of Admission: 9/11/2021    Hospital Course:   71-year-old gentleman with history of fibrillation, chronic congestive heart failure, diabetes type 2, history of smoking and alcohol use which is remote admitted here with bilateral hydronephrosis and possible pneumonia. Patient with multiple issues-  presented with profound weakness and fatigue. Patient has been losing weight. There has been concern about possible malignancy  Blood sugars been very low. Seen by heme-onc for leukopenia, urology consult pending for obstructive uropathy, seen by GI for constipation. .  Needs PT OT eval  And seen by cardiology      Subjective:   9/13  Laying in bed. appears very weak and cachectic  Has some chronic pain due to laying in bed. Patient does have diffuse LE  edema  He is awake alert and oriented times 3.   looks extremely cachectic   INR still elevated over 14      9/14  Patient is doing much better today. he sitting up on the chair - seen by PT OT. Strength is improving. LFTs slightly better . INR slightly improved . Overall looks much better today , he is awake alert and well-oriented. .    I had a lengthy discussion with patient and patient's wife and daughter at bedside and updated them on current work-up and plan of care      Medications:  Reviewed    Infusion Medications    dextrose      sodium chloride 25 mL (09/14/21 0014)    dextrose       Scheduled Medications    magnesium citrate  296 mL Oral Once    warfarin  1 mg Oral Daily    polyethylene glycol  17 g Oral BID    tamsulosin  0.4 mg Oral Daily    guaiFENesin  400 mg Oral BID    docusate sodium  200 mg Oral BID    bisacodyl  10 mg Rectal Once    doxycycline (VIBRAMYCIN) IV  100 mg IntraVENous Q12H    cefTRIAXone (ROCEPHIN) IV  1,000 mg IntraVENous Q24H    sodium chloride flush  10 mL IntraVENous 2 times per day     PRN Meds: glucose, dextrose, glucagon (rDNA), dextrose, sennosides-docusate sodium, ipratropium-albuterol, sodium chloride flush, sodium chloride, potassium chloride, magnesium sulfate, promethazine **OR** ondansetron, acetaminophen **OR** acetaminophen, dextrose      Intake/Output Summary (Last 24 hours) at 9/14/2021 1338  Last data filed at 9/14/2021 0800  Gross per 24 hour   Intake 240 ml   Output 950 ml   Net -710 ml       Physical Exam Performed:    BP (!) 100/55   Pulse 79   Temp 96.5 °F (35.8 °C) (Axillary)   Resp 16   Ht 5' 9\" (1.753 m)   Wt 136 lb 4.8 oz (61.8 kg)   SpO2 93%   BMI 20.13 kg/m²     General appearance:   Patient appears malnourished  He is in no distress. he is awake alert and well-oriented   appears very cachectic  HEENT: Pupils equal, round, and reactive to light. Conjunctivae/corneas clear. Neck: Supple, with full range of motion. No jugular venous distention. Trachea midline. Respiratory:  Normal respiratory effort. Clear to auscultation, bilaterally without Rales/Wheezes/Rhonchi. Cardiovascular: Irregularly irregular  Abdomen: Soft, non-tender, non-distended with normal bowel sounds. Musculoskeletal: No clubbing, cyanosis . Trace edema feet. Full range of motion without deformity. Skin: Chronic bilateral lower extremity wounds  Neurologic:  Neurovascularly intact without any focal sensory/motor deficits. Cranial nerves: II-XII intact, grossly non-focal.  Psychiatric: Alert and oriented    Labs:   Recent Labs     09/12/21  1821 09/13/21  0613 09/14/21  0610   WBC 6.3 7.8 10.2   HGB 12.9* 13.0* 11.9*   HCT 40.2* 39.1* 36.9*   * 103* 85*     Recent Labs     09/11/21  1535 09/11/21  1535 09/12/21  0532 09/13/21  0613 09/14/21  0610      < > 143 141 138   K 4.2   < > 3.1* 3.2* 4.0      < > 105 103 102   CO2 28   < > 26 28 31   BUN 53*   < > 51* 41* 35*   CREATININE 0.7*   < > 0.6* 0.6* <0.5*   CALCIUM 9.1   < > 8.7 8.9 8.5   PHOS 3.5  --   --   --   --     < > = values in this interval not displayed.      Recent Labs     09/12/21  0532 09/13/21  0613 09/14/21  0610   * 149* 172*   * 158* 202*   BILITOT 0.7 1.0 1.0   ALKPHOS 172* 218* 316*     Recent Labs     09/12/21  1821 09/13/21  0628 09/14/21  0610   INR 13.22* >14.27* 1.95*     Recent Labs     09/11/21  1535   TROPONINI <0.01       Urinalysis:      Lab Results   Component Value Date    NITRU Negative 09/12/2021    WBCUA 0-2 09/12/2021    RBCUA 11-20 09/12/2021    BLOODU SMALL 09/12/2021    SPECGRAV 1.015 09/12/2021    GLUCOSEU Negative 09/12/2021       Radiology:  CT CHEST ABDOMEN PELVIS W CONTRAST   Final Result   Multifocal pneumonia, greatest in the left lower lobe, new compared to   09/03/2021. There is a background emphysema and severe coronary artery   disease. Filling defects are seen in the left lower lobe, suggesting   sequelae from mucous plugging or aspiration. Bilateral hydronephrosis, likely secondary to markedly distended bladder. Left-sided inguinal hernia containing colon. No resultant bowel obstruction. CT HEAD WO CONTRAST   Final Result   No acute traumatic intracranial abnormality      Atrophy and small-vessel ischemic change      Probable calcified meningioma on the right. XR CHEST PORTABLE   Final Result   Left lower lobe pneumonia                 Assessment/Plan:    Multifocal pneumonia  Possible gram-negative infection  Patient has bronchiectasis  History of COPD  - on IV antibiotic -on Rocephin and doxycycline. Continue  -Continue HHN  treatment    Obstructive uropathy  Bilateral hydronephrosis  - Valentine catheter inserted  - Urology consulted . Likely related to severe constipation. Treat as below   -Continue Flomax. Keep Valentine in. And follow-up with urology as an outpatient    Constipation  - Started on on stool regimen  -Seen by GI ->recommendation was for endoscopic evaluation  I have discussed with GI about considering EGDs and colonoscopy while in-house.    Patient with persistent constipation, history of weight loss. Currently on Colace and MiraLAX. Suppositories added    Supratherapeutic INR  -INR over 14  Vitamin K 5 mg p.o. daily given for the last 2 days,   give 10 mg p.o. vitamin K on 9/13/2021. INR down from 14-> 12 today. Given 1 dose of vitamin K 10 mg today   no active bleed   INR remains elevated. Likely related to poor nutritional status    A. fib   - on chronic Coumadin-hold as above  -Chart reviewed , discussed with patient again . He states that he has not been on Verapamil   Check echocardiogram  TSH normal at 1.69 . Hypokalemia  - replete    Hypoglycemia,  - has lost a lot of weight  - AM cortisol level checked and normal.  Blood sugars more stable now continue to monitor. Severe malnutrition  -Encourage nutritional supplements    Patient with severe weight loss  -Needs endoscopic evaluation    Profound weakness and fatigue   -consulted PT OT   Need SNF    Chronic bilateral lower extremity wounds  -Seen by wound care    Leukopenia  - heme-onc consulted  - likely related to pneumonia   monitor white count. Better today    COVID-19 ruled out . patient has been vaccinated. Rapid Covid test was negative    DVT Prophylaxis:on warf, INR elevated  Diet: ADULT DIET; Regular  Code Status: Full Code      Dispo - ms    Discussed with patient and family. He will need SNF placement. From a GI standpoint,  patient is still constipated. will increase dose of MiraLAX, he is already getting Colace. He will get 1 dose of Dulcolax suppository today. I have discussed with the GI team to consider doing EGD and colonoscopy as an inpatient especially in this patient with unexplained weight loss. Oncology following - White count improving. HIV negative. Flow cytometry pending. Oncology might consider a bone marrow biopsy. Patient will need close outpatient follow-up with oncology.  continue - Valentine catheter .   patient likely had urinary retention and hydronephrosis related to constipation. treat constipation . keep Valentine in place. patient will need continued follow-up with urologist as outpatient. PSA 0.06    Patient is still coagulopathic. Continue to monitor INR and redose of vitamin K      Home medications reviewed with the patient and family. patient has not been on Lipitor or verapamil for a while. his Coumadin dose was 10 mg and 5 mg alternating prior to admission. patient was otherwise taking only Lasix, Colace and Flomax. Not on any antihypertensive medications         Total time today 50 minutes.  > 50 % time dominated by coordination of care and D/W family    Lazara Briseno MD    9/14/2021

## 2021-09-14 NOTE — CARE COORDINATION
INTERDISCIPLINARY PLAN OF CARE CONFERENCE    Date/Time: 9/14/2021 11:23 AM  Completed by: Ruiz Pineda RN, Case Management      Patient Name:  Agus Nesbitt  YOB: 1950  Admitting Diagnosis: General weakness [R53.1]  Elevated brain natriuretic peptide (BNP) level [R79.89]  Supratherapeutic INR [R79.1]  Supratherapeutic international normalized ratio (INR) [R79.1]  Pneumonia due to infectious organism, unspecified laterality, unspecified part of lung [J18.9]     Admit Date/Time:  9/11/2021  3:19 PM    Chart reviewed. Interdisciplinary team contacted or reviewed plan related to patient progress and discharge plans. Disciplines included Case Management, Nursing, and Dietitian. Current Status:2 west   PT/OT recommendation for discharge plan of care: SNF    Expected D/C Disposition:  tbd  Confirmed plan with patient and/or family Yes confirmed with: (name) pt and spouse  Met with:pt and spouse at bedside. Discharge Plan Comments: Chart reviewed. Discussed PT recommendation of STR and spouse states they are considering moving in with their dtr and RENU. Spouse approved for FMLA for 30 days and will provide 24/7. SNF list provided and call back number for CM, will need to follow up with pt for dc plan. Will follow.     Home O2 in place on admit: No

## 2021-09-15 NOTE — PROGRESS NOTES
Occupational Therapy Daily Treatment Note    Unit: 2 [place]  Date:  9/15/2021  Patient Name:    Becky Coronel  Admitting diagnosis:  General weakness [R53.1]  Elevated brain natriuretic peptide (BNP) level [R79.89]  Supratherapeutic INR [R79.1]  Supratherapeutic international normalized ratio (INR) [R79.1]  Pneumonia due to infectious organism, unspecified laterality, unspecified part of lung [J18.9]  Admit Date:  9/11/2021  Precautions/Restrictions:    Fall risk, Bed/chair alarm, Lines -Valentine catheter, Telemetry and Continuous pulse oximetry      Discharge Recommendations: SNF  DME needs for discharge: defer to facility       Therapy recommendations for staff:   Assist of 2 with use of ANTONIA BLAIR for all transfers to/from [facility]  to/from chair    AM-PAC Score: AM-PAC Inpatient Daily Activity Raw Score: 16  Home Health S4 Level: NA       Treatment Time:   9299-0728   Treatment number:  3  Timed code treatment minutes: 60 minutes  Total treatment minutes:   60  minutes    History of Present Illness:   per H&P on 9-11-21 Susanne Steve MD   80 y. o. male history of hypertension hyperlipidemia A. fib, CHF who presents to the emergency department complaining of generalized weakness fatigue, falls. Elysia Sandy found to have hypoglycemia earlier today with a blood glucose into the 40s.  Former diabetic form of Metformin no longer on medications for this with improved A1c.  Patient has had weight loss in the last month over 15 pounds unintentionally.  Family was concerned over the last 1 month increasing fatigue and difficulty with completing ADLs.  Has had difficulty now getting out of bed in the last 24 hours requiring assistance.  Had a fall down on on the ground but did not hit head.  Is on Coumadin for history of A. fib.  No chest pain no shortness of breath no abdominal pain.   Subjective:  Pt in the bed and needing encouragement to participate    Pain   Yes  Rating: mild  Location:LE  Pain Medicine Status: No request made      Bed Mobility:   Supine to Sit:  Mod A   Sit to Supine:  Max assist of two   Rolling:           Not Tested  Scooting:       Max assist of two to the head of the bed     Transfer Training:   Sit to stand:   Attempted standing from bed that was elevated 2x to the RW and pt unable to stand   Stand to sit:  NT  Bed to Chair:  NT- pt unable to stand today - nurse notified   Bed to [facility]:   Not Tested  Standard toilet:   Not Tested    Activity Tolerance   Pt completed therapy session with pain , fatigue , decreased balance , decreased strength   /60 after laying back in the bed. ADL Training:   Upper body dressing: Not Tested  Upper body bathing:  Not Tested  Lower body dressing:  Not Tested  Lower body bathing:  Not Tested  Toileting:   Not Tested  Grooming/Hygiene:  Not Tested    Therapeutic Exercise:   Shoulder shrugs 15x  Finger flex/ext 15x  Yellow thera band  Shoulder horizontal abduction 10x  Elbow flex/ext 10x     Patient Education:   Role of OT  Safe RW use/hand placement    Positioning Needs:   Pt up in chair, no alarm needed, positioned in proper neutral alignment and pressure relief provided. Family Present:  Yes    Assessment:   Pt was mod assist for supine to sit. The pt was unable to stand with max assist of two to the RW and bed elevated. The pt instructed in UE exercises. he pts sitting balance was good sitting on the edge of the bed. Pt is slow to move when given directions for tasks the directions need to be repeated . Recommended to the staff to use the stedy for transfer back to the bed. Pt continues to need OT to increase functional IND. GOALS  To be met in 3 Visits:  1). Bed to toilet/BSC: Mod A  and 2 persons with RW     To be met in 5 Visits:  1). Supine to/from Sit:             Min A   2). Upper Body Bathing:         CGA  3). Lower Body Bathing: Mod A   4). Upper Body Dressing:       Min A   5). Lower Body Dressing: Mod A  and Max A   6).  Pt to demonstrate UE exs x 15 reps with minimal cues      Plan: cont with POC      Sudhir Stevens OTR/L [id number]      If patient discharges from this facility prior to next visit, this note will serve as the Discharge Summary

## 2021-09-15 NOTE — PLAN OF CARE
Problem: Skin Integrity:  Goal: Will show no infection signs and symptoms  Description: Will show no infection signs and symptoms  9/14/2021 2324 by Patrick Ribeiro RN  Outcome: Ongoing  9/14/2021 0929 by Lynne Moreno RN  Outcome: Ongoing  Goal: Absence of new skin breakdown  Description: Absence of new skin breakdown  9/14/2021 2324 by Patrick Ribeiro RN  Outcome: Ongoing  9/14/2021 0929 by Lynne Moreno RN  Outcome: Ongoing     Problem: Falls - Risk of:  Goal: Will remain free from falls  Description: Will remain free from falls  9/14/2021 2324 by Patrick Ribeiro RN  Outcome: Ongoing  9/14/2021 0929 by Lynne Moreno RN  Outcome: Ongoing  Goal: Absence of physical injury  Description: Absence of physical injury  9/14/2021 0929 by Lynne Moreno RN  Outcome: Ongoing     Problem: Bleeding:  Goal: Will show no signs and symptoms of excessive bleeding  Description: Will show no signs and symptoms of excessive bleeding  9/14/2021 2324 by Patrick Ribeiro RN  Outcome: Ongoing  9/14/2021 0929 by Lynne Moreno RN  Outcome: Ongoing     Problem: Nutrition  Goal: Optimal nutrition therapy  9/14/2021 0929 by Lynne Moreno RN  Outcome: Ongoing  Goal: Understanding of nutritional guidelines  9/14/2021 0929 by Lynne Moreno RN  Outcome: Ongoing

## 2021-09-15 NOTE — PROGRESS NOTES
Pts blood sugar 53 pt provided with OJ recheck blood sugar 88 pt provided with additional OJ per pt request.

## 2021-09-15 NOTE — FLOWSHEET NOTE
09/14/21 2135   Vital Signs   Temp 98.3 °F (36.8 °C)   Temp Source Oral   Pulse 83   Heart Rate Source Monitor   Resp 18   /74   BP Location Right upper arm   Patient Position Semi fowlers   Level of Consciousness Alert (0)   MEWS Score 1   Oxygen Therapy   SpO2 93 %   O2 Device None (Room air)   Pt A/O assessment completed. Meds given per MAR. Pt refused Colace and Glycolax at this time. Dressings to BLE CDI. Mepilex noted to spine and inner right thigh. Pt remains scattered mottled. Dressing to left forearm scant dry drainage. Pt denies any needs. Call light within reach and bed alarm on.

## 2021-09-15 NOTE — PROGRESS NOTES
Hospitalist Progress Note      PCP: Kenny Duncan MD    Date of Admission: 9/11/2021    Hospital Course:   81-year-old gentleman with history of atrial fibrillation, chronic congestive heart failure, diabetes type 2, history of smoking and alcohol use which is remote admitted here with bilateral hydronephrosis and possible pneumonia. Patient with multiple issues-  presented with profound weakness and fatigue. Patient has been losing weight. There has been concern about possible malignancy  Blood sugars been very low. Patient with significant coagulopathy INR over 14    Seen by heme-onc cardiology, GI and urology   Seen by PT OT         Subjective:   Patient is very weak and fatigued today. Persistent issues with hypoglycemia, INR slowly improving. Spoke to patient's wife at bedside. Patient continues to appear very ill . He has been mostly oriented. But  Today we are noticing intermittent confusion. Did have multiple BMs this morning. Discussed with GI and they are planning for endoscopic evaluation. Appears very weak and cachectic  Has some chronic pain due to laying in bed. He had  diffuse LE  Edema-> this is decreased now  .       Medications:  Reviewed    Infusion Medications    dextrose      sodium chloride 25 mL (09/15/21 0010)    dextrose       Scheduled Medications    [START ON 9/16/2021] tamsulosin  0.8 mg Oral Daily    polyethylene glycol  17 g Oral BID    guaiFENesin  400 mg Oral BID    docusate sodium  200 mg Oral BID    bisacodyl  10 mg Rectal Once    doxycycline (VIBRAMYCIN) IV  100 mg IntraVENous Q12H    cefTRIAXone (ROCEPHIN) IV  1,000 mg IntraVENous Q24H    sodium chloride flush  10 mL IntraVENous 2 times per day     PRN Meds: glucose, dextrose, glucagon (rDNA), dextrose, sennosides-docusate sodium, ipratropium-albuterol, sodium chloride flush, sodium chloride, potassium chloride, magnesium sulfate, promethazine **OR** ondansetron, acetaminophen **OR** acetaminophen, dextrose      Intake/Output Summary (Last 24 hours) at 9/15/2021 1619  Last data filed at 9/15/2021 1434  Gross per 24 hour   Intake 270.9 ml   Output 1750 ml   Net -1479.1 ml       Physical Exam Performed:    /72   Pulse 88   Temp 97.8 °F (36.6 °C) (Oral)   Resp 16   Ht 5' 9\" (1.753 m)   Wt 136 lb 12.8 oz (62.1 kg)   SpO2 92%   BMI 20.20 kg/m²     General appearance:   Patient appears malnourished, very ill-appearing. Awake and alert with intermittent confusion today. appears very cachectic  HEENT: Pupils equal, round, and reactive to light. Conjunctivae/corneas clear. Neck: Supple, with full range of motion. No jugular venous distention. Trachea midline. Respiratory:  Normal respiratory effort. Clear to auscultation, bilaterally without Rales/Wheezes/Rhonchi. Cardiovascular: Irregularly irregular  Abdomen: Soft, non-tender, non-distended with normal bowel sounds. Musculoskeletal: No clubbing, cyanosis . Trace edema feet. Full range of motion without deformity. Skin: Chronic bilateral lower extremity wounds  Neurologic:  Neurovascularly intact without any focal sensory/motor deficits. Cranial nerves: II-XII intact, grossly non-focal.  Psychiatric: Intermittent confusion    Labs:   Recent Labs     09/13/21  0613 09/14/21  0610 09/15/21  1125   WBC 7.8 10.2 8.8   HGB 13.0* 11.9* 12.1*   HCT 39.1* 36.9* 37.1*   * 85* 81*     Recent Labs     09/13/21  0613 09/14/21  0610 09/15/21  1125    138 135*   K 3.2* 4.0 4.0    102 100   CO2 28 31 25   BUN 41* 35* 23*   CREATININE 0.6* <0.5* <0.5*   CALCIUM 8.9 8.5 8.6     Recent Labs     09/13/21  0613 09/14/21  0610   * 172*   * 202*   BILITOT 1.0 1.0   ALKPHOS 218* 316*     Recent Labs     09/13/21  1033 09/14/21  0610 09/15/21  1125   INR 12.34* 1.95* 1.52*     No results for input(s): Adonica Hoose in the last 72 hours.     Urinalysis:      Lab Results   Component Value Date    NITRU Negative 09/12/2021 WBCUA 0-2 09/12/2021    RBCUA 11-20 09/12/2021    BLOODU SMALL 09/12/2021    SPECGRAV 1.015 09/12/2021    GLUCOSEU Negative 09/12/2021       Radiology:  CT CHEST ABDOMEN PELVIS W CONTRAST   Final Result   Multifocal pneumonia, greatest in the left lower lobe, new compared to   09/03/2021. There is a background emphysema and severe coronary artery   disease. Filling defects are seen in the left lower lobe, suggesting   sequelae from mucous plugging or aspiration. Bilateral hydronephrosis, likely secondary to markedly distended bladder. Left-sided inguinal hernia containing colon. No resultant bowel obstruction. CT HEAD WO CONTRAST   Final Result   No acute traumatic intracranial abnormality      Atrophy and small-vessel ischemic change      Probable calcified meningioma on the right. XR CHEST PORTABLE   Final Result   Left lower lobe pneumonia           Echocardiogram  Summary   Left ventricular systolic function is reduced with ejection fraction   estimated at 45-51 %. All remaining wall segments appear normal in function. Left ventricular size is decreased. There is moderate concentric left ventricular hypertrophy. Normal left ventricular diastolic filling pressure. The right atrium is mildly dilated. The aortic root is mildly dilated. Mild mitral regurgitation. Mild-to-moderate aortic regurgitation is present. Mild to moderate tricuspid regurgitation. Normal systolic pulmonary artery pressure (SPAP) estimated at 31 mmHg (RA   pressure 8 mmHg). There is a pleural effusion. 1/15/2016 LVE, LVH, 55%, LAE, RVE, MAYELA    Assessment/Plan:    Multifocal pneumonia  Possible gram-negative infection  Patient has bronchiectasis  History of COPD  - on IV antibiotic -on Rocephin and doxycycline. Continue  -Continue HHN  treatment    Obstructive uropathy  Bilateral hydronephrosis  - Valentine catheter inserted  - Urology consulted . Likely related to severe constipation.   Treat as below  -PSA normal   -Continue Flomax. Keep Valentine in. And follow-up with urology as an outpatient    Constipation  - Started on on stool regimen. Patient on Colace and MiraLAX, got suppositories. + BM today  -Seen by GI -> plan is for  EGD and colonoscopy in AM   Patient with significant weight loss. Stool for occult blood was negative      Supratherapeutic INR  -INR over 14  -Received multiple doses of oral vitamin K. INR slowly trending down. INR down from 14-> 12 --> 1.5 today. No active bleed   INR remains elevated. Likely related to poor nutritional status    A. fib   - on chronic Coumadin-hold as above. INR correcting now likely resume Coumadin tomorrow after endoscopic evaluation.  -Chart reviewed , discussed with patient again . He states that he has not been on Verapamil   Checked  Echocardiogram - > normal EF  TSH normal at 1.69 . Hypokalemia  - replete    Hypoglycemia,  - has lost a lot of weight  - AM cortisol level checked and normal.  -Blood sugars more stable now continue to monitor. Severe protein calorie malnutrition  -Encourage nutritional supplements    Patient with severe weight loss  -Needs endoscopic evaluation    Profound weakness and fatigue   -consulted PT OT   Need SNF    Chronic bilateral lower extremity wounds  -Seen by wound care    Leukopenia  - heme-onc consulted  - likely related to pneumonia   monitor white count. Better today    Oncology work-up so far  Hepatitis panel negative  HIV negative  Flow cytometry negative  LINDY negative  Rheumatoid factor negative  Elevated CRP and ferritin levels.-Likely reactive. Hemochromatosis screen pending    COVID-19 ruled out . patient has been vaccinated. Rapid Covid test was negative    Elevated LFTs  -Monitor  So far work-up negative. Unclear if related to malnutrition    DVT Prophylaxis:on warf, INR elevated  Diet: ADULT DIET; Regular  Code Status: Full Code      Dispo - ms    He will need SNF placement.        Home medications reviewed with the patient and family. patient has not been on Lipitor or verapamil for a while. his Coumadin dose was 10 mg and 5 mg alternating prior to admission. patient was otherwise taking only Lasix, Colace and Flomax.  Not on any antihypertensive medications      Updated patient's wife at bedside with plan of care    Alexander Houston MD    9/15/2021

## 2021-09-15 NOTE — PROGRESS NOTES
Inpatient Physical Therapy Daily Treatment Note    Unit: 2 [place]  Date:  9/15/2021  Patient Name:    Dao Durham  Admitting diagnosis:  General weakness [R53.1]  Elevated brain natriuretic peptide (BNP) level [R79.89]  Supratherapeutic INR [R79.1]  Supratherapeutic international normalized ratio (INR) [R79.1]  Pneumonia due to infectious organism, unspecified laterality, unspecified part of lung [J18.9]  Admit Date:  9/11/2021  Precautions/Restrictions/WB Status/ Lines/ Wounds/ Oxygen: Fall risk, Bed/chair alarm, Lines -Valentine catheter, Telemetry and Continuous pulse oximetry    Treatment Time:  14:04-15:08  Treatment Number:  2  Timed Code Treatment Minutes: 64 minutes  Total Treatment Minutes:  64 minutes    Patient Goals for Therapy: \" to sit up in a chair \"          Discharge Recommendations: SNF  DME needs for discharge: defer to facility       Therapy recommendation for EMS Transport: requires transport by cot due to need for lift equipment to safely transfer    Therapy recommendations for staff:   Assist of 2 with use of [address] for all transfers to/from [facility]  to/from [facility]    History of Present Illness: Per H&P Dr. Griselda Ahle 9/11: \"79 y.o. male who presented to [facility] with past medical history of A. fib on Coumadin, HFpEF, type 2 diabetes, hypertension, hyperlipidemia, BELKYS on BiPAP presented to the ED for worsening fatigue unintentional weight loss and inability ambulate.     Patient reported he has lost almost 10 to 20 pounds unintentionally from previous. Patient in the picture in the chart looks much more skinnier malnourished anorexic. Patient reported that he has been having appropriate appetite eating and drinking and has been having increased cough no phlegm but does report shortness of breath on exertion. Alleviated with rest.  Patient reports that he was seen his primary care and was reported that he needs to be follow-up with GI and heme-onc.   Patient's daughter is a pediatrician physician and I also spoke on the phone to discuss care. Patient does not currently smoke drink however does have extensive 20+ pack years of smoking in addition to drinking. Patient also more than 20 years from the time where he needs a colonoscopy. Patient also reports been having intermittent diarrhea and constipation no known alleviating exacerbating factor as well. No reported bleeding. Patient also reports that he has lower extremity edema that is progressive despite putting Ace wrap in addition has a lesion on the right foot which the patient does not take care of nor follow-up. CODE STATUS discussed and the patient is a full code. \"    Home Health S4 Level Recommendation:  NA  AM-PAC Mobility Score    AM-PAC Inpatient Mobility Raw Score : 10  AM-PAC Inpatient Mobility without Stair Climbing Raw Score : 9    Pain   Yes  Location: dru WHITESIDE's  Rating: mild /10   Pain Medicine Status: No request made    Cognition    A&O x4   Able to follow 2 step commands    Subjective  Patient lying semifowlers in bed with spouse present. Pt agreeable to this PT tx. Balance  Sitting:  Good - ; Supervision at EOB  Comments: Heavily flexed posture. Pt able to weight shift to facilitate scooting and repositioning sheets under bed with up to Min-Mod A at times and moderate cuing for technique. Standing: Not tested; Not Tested  Comments:  N/A    Bed Mobility   Supine to Sit:    Mod A   Sit to Supine: Mod A (total A with BLE, pt controlling trunk)  Rolling:   Not Tested  Scooting in sitting: Fluctuating, SBA up to Mod A. Heavy cues. Scooting in supine: Total A and 2 persons     Transfer Training     Sit to stand:   Attempted standing from elevated EOB with walker x2 trials; pt extremely weak today. Deferred trial with STEDY given very limited strength with walker trials. Max A for positioning of LEs. Stand to sit:   Min A  and 2 persons   Bed to Chair:  Not Testedand 2 persons with use of N/A.      Gait gait deferred due to difficulty with transfers; pt ambulated 0 ft. Distance:      NA ft  Deviations (firm surface/linoleum):  N/A  Assistive Device Used:    N/A  Level of Assist:    N/A  Comment: NA    Stair Training deferred, pt unsafe/ not appropriate to complete stairs at this time    Activity Tolerance   Pt completed therapy session with No adverse symptoms noted w/activity    Positioning Needs   Pt in bed, no alarm needed, positioned in proper neutral alignment and pressure relief provided. Spouse in room. RN alerted of pt's status. Exercises Initiated  completed in supine position  Hip Add/Abd x 5 reps RLE with slide sheet   Heel slides x 10 reps with slide sheet and yellow TB for AAROM  Ankle pumps x ~50 throughout treatment. Other  None. Patient/Family Education   Pt educated on role of inpatient PT, POC, importance of continued activity, transfer techniques and calling for assist with mobility. Assessment  Pt seen for Physical Therapy follow up treatment. Pt appears more weak today during 2 standing trials from EOB. He is unable to contribute appreciably with extension through LEs; trunk advances directly forward towards walker. He may perhaps have been fatigued after supine UE/LE therex. STEDY transfer deferred given notable weakness with walker trials. Recommending SNF upon discharge as patient functioning well below baseline, demonstrates good rehab potential and unable to return home due to inability to negotiate stairs to enter home/bedroom/bathroom, burden of care beyond caregiver ability and home environment not conducive to patient recovery. Goals : To be met in 3 visits:  1). Independent with LE Ex x 10 reps    To be met in 6 visits:  1). Supine to/from sit: Supervision  2). Sit to/from stand: SBA  3). Bed to chair: SBA  4). Gait: Establish goal after trial.  5).   Tolerate B LE exercises 3 sets of 10-15 reps    Rehabilitation Potential: Good  Strengths for achieving goals include:   Pt motivated, PLOF, Family Support and Pt cooperative   Barriers to achieving goals include:    Complexity of condition    Plan    To be seen 3-5 x / week  while in acute care setting for therapeutic exercises, bed mobility, transfers, progressive gait training, balance training, and family/patient education. Signature: Neha Richards PT, DPT    If patient discharges from this facility prior to next visit, this note will serve as the Discharge Summary.

## 2021-09-15 NOTE — CARE COORDINATION
INTERDISCIPLINARY PLAN OF CARE CONFERENCE    Date/Time: 9/15/2021 4:07 PM  Completed by: Raul Mccormack RN, Case Management      Patient Name:  Deana Scott  YOB: 1950  Admitting Diagnosis: General weakness [R53.1]  Elevated brain natriuretic peptide (BNP) level [R79.89]  Supratherapeutic INR [R79.1]  Supratherapeutic international normalized ratio (INR) [R79.1]  Pneumonia due to infectious organism, unspecified laterality, unspecified part of lung [J18.9]     Admit Date/Time:  9/11/2021  3:19 PM    Chart reviewed. Interdisciplinary team contacted or reviewed plan related to patient progress and discharge plans. Disciplines included Case Management, Nursing, and Dietitian. Current Status:INPT 9/11/21  PT/OT recommendation for discharge plan of care: SNF    Expected D/C Disposition:  Home vs SNF  Confirmed plan with patient and/or family No confirmed with: (name) with pt and wife  Met with:pt and wife at bedside  Discharge Plan Comments: Reviewed chart and spoke with pt and pt wife at bedside at length re possible discharge care options. Pt and pt wife are considering home with family assist vs STR. Pt and pt wife would like referral called to [facility] for poss STR due to pt increased needs and level of function at this time.  called [facility] and spoke with Nelda Dumont who states that they are in network with pt insurance and that precerts are currently waived with pt insurance through Oct. 31st. Nelda Dumont states that he will review pt information and will return call to  in the morning re bed confirmation. Pt and wife updated at this time.     Home O2 in place on admit: No  Pt informed of need to bring portable home O2 tank on day of discharge for nursing to connect prior to leaving:  Not Indicated  Verbalized agreement/Understanding:  Not Indicated

## 2021-09-15 NOTE — PROGRESS NOTES
Patient:  Bonnie Musa  YOB: 1950   Date of Service:  09/15/21      Urology Attending Daily Progress Note    Chief complaint: \"doing better\"    Interval HPI:  The patient did well overnight. Pain is controlled with medications. Tolerating diet. Denies nausea, vomiting, fevers, or chest pain. Ambulating minimally. PT/OT on board       Objective:    No data found. I/O last 3 completed shifts: In: 390.9 [P.O.:120; IV Piggyback:270.9]  Out: 1900 [Urine:1900]     Physical Exam:   Constitutional: comfortable in hospital bed, no acute distress  Abdomen: soft, nontender   Genitourinary: urethal aguayo draining clear urine  Psych: normal mood and affect, appropriately answers questions   Skin, extremities: stable, exposed skin intact, no digital cyanosis     Labs:     Lab Results   Component Value Date    PSA 0.06 08/23/2021     Lab Results   Component Value Date    CREATININE <0.5 (L) 09/15/2021    CREATININE <0.5 (L) 09/14/2021    CREATININE 0.6 (L) 09/13/2021     Lab Results   Component Value Date    COLORU Yellow 09/12/2021    NITRU Negative 09/12/2021    GLUCOSEU Negative 09/12/2021    KETUA Negative 09/12/2021    UROBILINOGEN 0.2 09/12/2021    BILIRUBINUR Negative 09/12/2021     Lab Results   Component Value Date    WBC 8.8 09/15/2021    HGB 12.1 (L) 09/15/2021    HCT 37.1 (L) 09/15/2021    MCV 96.3 09/15/2021    PLT 81 (L) 09/15/2021       Radiology: \"Imaging was independently reviewed by myself and I agree with the radiology interpretation    Assessment:  Weakness, malnutrition  Bilateral hydronephrosis, likely from retention  Acute urinary retention - likely multifactorial but constipation is not helping  -avoid anti-muscarinic, antihistamine, and alpha-agonist medications as these will exacerbate urinary retention. Recommend minimizing the narcotic pain medication regimen as tolerated. Increased ambulation/mobility will also usually help with improvement in the degree of urinary retention. -alpha-blocker therapy with Flomax 0.4mg TWICE qday - this is home dose     -You can attempt a voiding trial prior to discharge if here another 3-5 days otherwise best to discharge patient with the aguayo and have him follow up in the urology clinic with an early morning visit - he will see Dr Colt Westbrook outpatient   -aggressive bowel regimen  -at some point would benefit from cystoscopy but can likely be done as outpatient - Dr Colt Westbrook will coordinate    Signing off - call with questions     Kehinde Rivas MD  [facility]   [phone] - Please call with questions

## 2021-09-15 NOTE — PROGRESS NOTES
PROGRESS NOTE  S:71 yrs Patient  admitted on 9/11/2021 with General weakness [R53.1]  Elevated brain natriuretic peptide (BNP) level [R79.89]  Supratherapeutic INR [R79.1]  Supratherapeutic international normalized ratio (INR) [R79.1]  Pneumonia due to infectious organism, unspecified laterality, unspecified part of lung [J18.9] . Today he feels improved. He passed 2 large BMs yesterday, and is tolerating diet. Exam:   Vitals:    09/15/21 1432   BP: 108/72   Pulse: 88   Resp: 16   Temp: 97.8 °F (36.6 °C)   SpO2: 92%      General appearance: alert, appears older than stated age, cooperative, no distress and syndromic appearance - chronically ill appearing  HEENT: Oropharynx clear, no lesions  Neck: no adenopathy and supple, symmetrical, trachea midline  Lungs: clear to auscultation bilaterally  Heart: regular rate and rhythm, S1, S2 normal, no murmur, click, rub or gallop  Abdomen: soft, non-tender; bowel sounds normal; no masses,  no organomegaly  Extremities: edema 1+ BLE     Medications: Reviewed    Labs:  CBC:   Recent Labs     09/13/21  0613 09/14/21  0610 09/15/21  1125   WBC 7.8 10.2 8.8   HGB 13.0* 11.9* 12.1*   HCT 39.1* 36.9* 37.1*   MCV 96.0 99.3 96.3   * 85* 81*     BMP:   Recent Labs     09/13/21  0613 09/14/21  0610 09/15/21  1125    138 135*   K 3.2* 4.0 4.0    102 100   CO2 28 31 25   BUN 41* 35* 23*   CREATININE 0.6* <0.5* <0.5*     LIVER PROFILE:   Recent Labs     09/13/21  0613 09/14/21  0610   * 172*   * 202*   PROT 5.5* 4.4*   BILITOT 1.0 1.0   ALKPHOS 218* 316*     PT/INR:   Recent Labs     09/13/21  1033 09/14/21  0610 09/15/21  1125   INR 12.34* 1.95* 1.52*       IMAGING:  CT CHEST ABDOMEN PELVIS W CONTRAST   Final Result   Multifocal pneumonia, greatest in the left lower lobe, new compared to   09/03/2021. There is a background emphysema and severe coronary artery   disease.   Filling defects are seen in the left lower lobe, suggesting   sequelae from mucous plugging or aspiration. Bilateral hydronephrosis, likely secondary to markedly distended bladder. Left-sided inguinal hernia containing colon. No resultant bowel obstruction. CT HEAD WO CONTRAST   Final Result   No acute traumatic intracranial abnormality      Atrophy and small-vessel ischemic change      Probable calcified meningioma on the right. XR CHEST PORTABLE   Final Result   Left lower lobe pneumonia           Attending Supervising [de-identified] Attestation Statement  The patient is a 80 y.o. male. I have performed a history and physical examination of the patient. I discussed the case with my physician assistant Lennie Farias PA-C    I reviewed the patient's Past Medical History, Past Surgical History, Medications, and Allergies. Physical Exam:  Vitals:    09/14/21 1416 09/14/21 2135 09/15/21 0319 09/15/21 1432   BP: 97/65 122/74 (!) 93/56 108/72   Pulse: 86 83 71 88   Resp: 16 18 16 16   Temp: 97.1 °F (36.2 °C) 98.3 °F (36.8 °C) 96.7 °F (35.9 °C) 97.8 °F (36.6 °C)   TempSrc: Oral Oral Oral Oral   SpO2:  93% 98% 92%   Weight:   136 lb 12.8 oz (62.1 kg)    Height:           Physical Examination: General appearance - chronically ill appearing  Mental status - alert, oriented to person, place, and time  Eyes - pupils equal and reactive, extraocular eye movements intact  Neck - supple, no significant adenopathy  Chest - clear to auscultation, no wheezes, rales or rhonchi, symmetric air entry  Heart - normal rate, regular rhythm, normal S1, S2, no murmurs, rubs, clicks or gallops  Abdomen - soft, nontender, nondistended, no masses or organomegaly  Extremities - no pedal edema noted            Impression: 80year old male with a history of atrial fibrillation on Coumadin, CHF, diabetes, HTN, HLD, and BELKYS on BiPAP admitted with weakness due to pneumonia, abnormal LFTs, and weight loss.       Recommendation:  1.  Continue supportive care  2. Monitor LFTs  3. Monitor and document output  4. Monitor and replenish electrolytes  5. Continue broad spectrum antibiotics  6. Liver serologies normal   7. Continue diet as tolerated  8. Encourage nutrition with supplementation  9. PT/OT  10. Hematology following  11. Urology following   12. Will follow  13. Will consider inpatient EGD +/- colonoscopy once cardiopulmonary status is optimized      Radha Short PA-C  3:58 PM 9/15/2021                      80year old male with a history of HTN, HLD, DM, CHF, atrial fibrillation on Coumadin, and BELKYS on BiPAP admitted with pneumonia and weight loss. Continue supportive care. Broad spectrum antibiotics. Monitor LFTs. Await liver serologies.  EGD+colonoscopy once he is cleared from medical standpoint    Cullen Alberto MD          [phone]  Cond Eth

## 2021-09-15 NOTE — PROGRESS NOTES
ONCOLOGY HEMATOLOGY CARE PROGRESS NOTE      SUBJECTIVE:       INR 1.4 after Vit K. No fever. Pt restful on Bipap this AM.     ROS:     10 point ROS completed. Pertinent positives in HPI, otherwise negative. OBJECTIVE        Physical    VITALS:  BP (!) 93/56   Pulse 71   Temp 96.7 °F (35.9 °C) (Oral)   Resp 16   Ht 5' 9\" (1.753 m)   Wt 136 lb 12.8 oz (62.1 kg)   SpO2 98%   BMI 20.20 kg/m²   TEMPERATURE:  Current - Temp: 96.7 °F (35.9 °C); Max - Temp  Av.2 °F (36.2 °C)  Min: 96.5 °F (35.8 °C)  Max: 98.3 °F (36.8 °C)  PULSE OXIMETRY RANGE: SpO2  Av.5 %  Min: 93 %  Max: 98 %  24HR INTAKE/OUTPUT:      Intake/Output Summary (Last 24 hours) at 9/15/2021 0805  Last data filed at 9/15/2021 0319  Gross per 24 hour   Intake 390.9 ml   Output 1550 ml   Net -1159.1 ml       CONSTITUTIONAL: awake, alert, cooperative, no apparent distress ; temporal wasting   EYES: pupils equal, round and reactive to light, sclera clear and conjunctiva normal  ENT: Normocephalic, without obvious abnormality, atraumatic  NECK: supple, symmetrical, no jugular venous distension and no carotid bruits   HEMATOLOGIC/LYMPHATIC: no cervical, supraclavicular or axillary lymphadenopathy   LUNGS: no increased work of breathing and clear to auscultation   CARDIOVASCULAR: regular rate and rhythm, normal S1 and S2, no murmur noted  ABDOMEN: normal bowel sounds x 4, soft, non-distended, non-tender, no masses palpated, no hepatosplenomgaly   MUSCULOSKELETAL: full range of motion noted, tone is normal  NEUROLOGIC: awake, alert, oriented to name, place and time. Motor skills grossly intact.    SKIN: scattered bruising and skin tears to Bilateral forearms   EXTREMITIES: no LE edema       Data      Recent Labs     21  1821 21  0613 21  0610   WBC 6.3 7.8 10.2   HGB 12.9* 13.0* 11.9*   HCT 40.2* 39.1* 36.9*   * 103* 85*   MCV 96.8 96.0 99.3        Recent Labs     21  0613 09/14/21  0610    138   K 3.2* 4.0    102   CO2 28 31   BUN 41* 35*   CREATININE 0.6* <0.5*     Recent Labs     09/13/21  0613 09/14/21  0610   * 172*   * 202*   BILITOT 1.0 1.0   ALKPHOS 218* 316*       Magnesium:    Lab Results   Component Value Date    MG 1.90 09/13/2021    MG 2.00 09/12/2021    MG 2.20 09/11/2021         Problem List  Patient Active Problem List   Diagnosis    Atrial fibrillation ([facility]    Diabetes mellitus ([facility]    Hyperlipidemia    Urinary retention    Sleep apnea    Morbid obesity with BMI of 40.0-44.9, adult ([facility]    Prolonged pt (prothrombin time)    Snoring    Dyspnea    Chronic atrial fibrillation (HCC)    Essential hypertension    BELKYS treated with BiPAP    Nocturnal hypoxemia    Supratherapeutic INR    General weakness    Supratherapeutic international normalized ratio (INR)    Pneumonia due to infectious organism    Constipation    Weight loss       ASSESSMENT AND PLAN:    1. Leukopenia/TCP     - WBC stable now at 7.8 and Hgb 13 with plt 103k now   - this is new with normal CBC last month - likely from pneumonia  - marrow signal abnl on recent MRI - finding is non-specific  - Hapto is 107, , CRP high at 178, sed rate is 48, RF slightly high at 15, SPEP no bands ; Iron stores are 40, TIBC 102, and sat is normal at 39 percent. Ferritin is grossly high at 2700 ; Hepatitis is negative, IGG slightly low at 625- likely due to acute infection ; HIV pending, B12 over 2k, Folate is 12 ; LINDY pending, RF slightly high at 1  - request smear  - may need BMBX if worsening  - Flow cytometry is negative  - WBC improved, Plt remain mildly low   - checking hemochromatosis screen with high ferritin but suspect this is reactive - pending     2.  Weight Loss/Cahcexia     - no definite malignancy on CT scan  - bilateral hydronephrosis on CT - urology planning outpatient cystoscopy  - GI consulted - last c-scope 20 years ago; EGD and C scope are planned

## 2021-09-15 NOTE — PROGRESS NOTES
Bedside report given to [facility] RN  pt in stable condition no needs at this time.  Call light within reach

## 2021-09-16 NOTE — PROGRESS NOTES
4.0 4.0 3.8    100 102   CO2 31 25 28   BUN 35* 23* 21*   CREATININE <0.5* <0.5* <0.5*     Recent Labs     09/14/21  0610 09/16/21  0553   * 85*   * 155*   BILITOT 1.0 0.9   ALKPHOS 316* 313*       Magnesium:    Lab Results   Component Value Date    MG 1.90 09/13/2021    MG 2.00 09/12/2021    MG 2.20 09/11/2021         Problem List  Patient Active Problem List   Diagnosis    Atrial fibrillation ([facility]    Diabetes mellitus ([facility]    Hyperlipidemia    Urinary retention    Sleep apnea    Morbid obesity with BMI of 40.0-44.9, adult ([facility]    Prolonged pt (prothrombin time)    Snoring    Dyspnea    Chronic atrial fibrillation (HCC)    Essential hypertension    BELKYS treated with BiPAP    Nocturnal hypoxemia    Supratherapeutic INR    General weakness    Supratherapeutic international normalized ratio (INR)    Pneumonia due to infectious organism    Constipation    Weight loss       ASSESSMENT AND PLAN:    1. Leukopenia/TCP     - WBC stable now at 7.8 and Hgb 13 with plt 103k now   - this is new with normal CBC last month - likely from pneumonia  - marrow signal abnl on recent MRI - finding is non-specific  - Hapto is 107, , CRP high at 178, sed rate is 48, RF slightly high at 15, SPEP no bands ; Iron stores are 40, TIBC 102, and sat is normal at 39 percent. Ferritin is grossly high at 2700 ; Hepatitis is negative, IGG slightly low at 625- likely due to acute infection ; HIV pending, B12 over 2k, Folate is 12 ; LINDY pending, RF slightly high at 1  - request smear  - may need BMBX if worsening  - Flow cytometry is negative  - WBC improved, Plt remain mildly low   - checking hemochromatosis screen with high ferritin but suspect this is reactive - pending     2. Weight Loss/Cahcexia     - no definite malignancy on CT scan  - bilateral hydronephrosis on CT - urology planning outpatient cystoscopy  - GI consulted - last c-scope 20 years ago; EGD and C scope are planned in future     3. Pneumonia     - abx per IM    4.  Coagulopathy   - INR over 14 9/13  - suspect from poor nutrition  - Vitamin K 10 mg PO ordered X 1 dose and a mixing study to assess for underlying inhibitor   - INR checks daily ; likely due to profound malnutrition   - check Factor VII and lupus anticoagulant based on mixing study results      ONCOLOGIC DISPOSITION: per IM       Artie Desai MD

## 2021-09-16 NOTE — PLAN OF CARE
Problem: Skin Integrity:  Goal: Will show no infection signs and symptoms  Description: Will show no infection signs and symptoms  Outcome: Ongoing    Problem: Falls - Risk of:  Goal: Will remain free from falls  Description: Will remain free from falls  Outcome: Ongoing     Problem: Bleeding:  Goal: Will show no signs and symptoms of excessive bleeding  Description: Will show no signs and symptoms of excessive bleeding  Outcome: Ongoing     Problem: Nutrition  Goal: Optimal nutrition therapy  Outcome: Ongoing

## 2021-09-16 NOTE — PROGRESS NOTES
Hospitalist Progress Note      PCP: Dalia Quintanilla MD    Date of Admission: 9/11/2021    Hospital Course:   41-year-old gentleman with history of atrial fibrillation, chronic congestive heart failure, diabetes type 2, history of smoking and alcohol use which is remote admitted here with bilateral hydronephrosis and possible pneumonia. Patient with multiple issues-  presented with profound weakness and fatigue. Patient has been losing weight. There has been concern about possible malignancy  Blood sugars been very low. Patient with significant coagulopathy INR over 14    Seen by heme-onc cardiology, GI and urology   Seen by PT OT         Subjective:   Patient is more awake and alert today . Well-oriented . On clear liquids , plan is for n.p.o. after midnight for endoscopic procedure . Less hypoglycemic episodes now. INR improved. Spoke to patient's wife at bedside.         Medications:  Reviewed    Infusion Medications    IV infusion builder      dextrose      sodium chloride 25 mL (09/15/21 0010)    dextrose       Scheduled Medications    [START ON 9/17/2021] polyethylene glycol  120 g Oral Once    polyethylene glycol  120 g Oral Once    tamsulosin  0.8 mg Oral Daily    polyethylene glycol  17 g Oral BID    guaiFENesin  400 mg Oral BID    docusate sodium  200 mg Oral BID    bisacodyl  10 mg Rectal Once    doxycycline (VIBRAMYCIN) IV  100 mg IntraVENous Q12H    cefTRIAXone (ROCEPHIN) IV  1,000 mg IntraVENous Q24H    sodium chloride flush  10 mL IntraVENous 2 times per day     PRN Meds: glucose, dextrose, glucagon (rDNA), dextrose, sennosides-docusate sodium, ipratropium-albuterol, sodium chloride flush, sodium chloride, potassium chloride, magnesium sulfate, promethazine **OR** ondansetron, acetaminophen **OR** acetaminophen, dextrose      Intake/Output Summary (Last 24 hours) at 9/16/2021 4522  Last data filed at 9/16/2021 1134  Gross per 24 hour   Intake --   Output 1200 ml   Net -1200 ml       Physical Exam Performed:    /71   Pulse 72   Temp 98.1 °F (36.7 °C) (Oral)   Resp 16   Ht 5' 9\" (1.753 m)   Wt 134 lb 1.6 oz (60.8 kg)   SpO2 94%   BMI 19.80 kg/m²     General appearance:   Patient appears malnourished, very ill-appearing. Awake , alert and oriented  appears very cachectic  Multiple areas of bruising and ecchymosis  HEENT: Pupils equal, round, and reactive to light. Conjunctivae/corneas clear. Neck: Supple, with full range of motion. No jugular venous distention. Trachea midline. Respiratory:  Normal respiratory effort. Clear to auscultation, bilaterally without Rales/Wheezes/Rhonchi. Cardiovascular: Irregularly irregular  Abdomen: Soft, non-tender, non-distended with normal bowel sounds. Musculoskeletal: No clubbing, cyanosis . Trace edema feet. Full range of motion without deformity. Skin: Chronic bilateral lower extremity wounds  Multiple areas of bruising and ecchymosis  Neurologic:  Neurovascularly intact without any focal sensory/motor deficits. Cranial nerves: II-XII intact, grossly non-focal.  Psychiatric: No anxiety or confusion today. Well-oriented    Labs:   Recent Labs     09/14/21  0610 09/15/21  1125   WBC 10.2 8.8   HGB 11.9* 12.1*   HCT 36.9* 37.1*   PLT 85* 81*     Recent Labs     09/14/21  0610 09/15/21  1125 09/16/21  0553    135* 138   K 4.0 4.0 3.8    100 102   CO2 31 25 28   BUN 35* 23* 21*   CREATININE <0.5* <0.5* <0.5*   CALCIUM 8.5 8.6 8.3     Recent Labs     09/14/21  0610 09/16/21  0553   * 85*   * 155*   BILITOT 1.0 0.9   ALKPHOS 316* 313*     Recent Labs     09/14/21  0610 09/15/21  1125 09/16/21  0553   INR 1.95* 1.52* 1.38*     No results for input(s): Prentis Revels in the last 72 hours.     Urinalysis:      Lab Results   Component Value Date    NITRU Negative 09/12/2021    WBCUA 0-2 09/12/2021    RBCUA 11-20 09/12/2021    BLOODU SMALL 09/12/2021    SPECGRAV 1.015 09/12/2021    GLUCOSEU Negative 09/12/2021 Radiology:  CT CHEST ABDOMEN PELVIS W CONTRAST   Final Result   Multifocal pneumonia, greatest in the left lower lobe, new compared to   09/03/2021. There is a background emphysema and severe coronary artery   disease. Filling defects are seen in the left lower lobe, suggesting   sequelae from mucous plugging or aspiration. Bilateral hydronephrosis, likely secondary to markedly distended bladder. Left-sided inguinal hernia containing colon. No resultant bowel obstruction. CT HEAD WO CONTRAST   Final Result   No acute traumatic intracranial abnormality      Atrophy and small-vessel ischemic change      Probable calcified meningioma on the right. XR CHEST PORTABLE   Final Result   Left lower lobe pneumonia           Echocardiogram  Summary   Left ventricular systolic function is reduced with ejection fraction   estimated at 45-51 %. All remaining wall segments appear normal in function. Left ventricular size is decreased. There is moderate concentric left ventricular hypertrophy. Normal left ventricular diastolic filling pressure. The right atrium is mildly dilated. The aortic root is mildly dilated. Mild mitral regurgitation. Mild-to-moderate aortic regurgitation is present. Mild to moderate tricuspid regurgitation. Normal systolic pulmonary artery pressure (SPAP) estimated at 31 mmHg (RA   pressure 8 mmHg). There is a pleural effusion. 1/15/2016 LVE, LVH, 55%, LAE, RVE, MAYELA        Assessment/Plan:    Multifocal pneumonia  Possible gram-negative infection  Patient has bronchiectasis  History of COPD  - on IV antibiotic -on Rocephin and doxycycline. Continue  -Continue HHN  Treatment  Oxygen saturations are stable. Patient still coughing up some sputum    Obstructive uropathy  Bilateral hydronephrosis  - Valentine catheter inserted  - Urology consulted . Likely related to severe constipation. Treat as below  -PSA normal   -Continue Flomax. Keep Valentine in.   And follow-up with urology as an outpatient    Constipation  - Started on on stool regimen with good response. Patient on Colace and MiraLAX, got suppositories. + BM . -Seen by GI -> plan is for  EGD and colonoscopy in AM   Patient with significant weight loss. Stool for occult blood was negative    Supratherapeutic INR  Coagulopathy  -INR over 14  -Likely related to poor nutritional status  -Received multiple doses of oral vitamin K. INR slowly trending down. INR down from 14-> 12 --> 1.5 today. No active bleed   Monitor    A. fib   - on chronic Coumadin-hold as above. INR correcting now likely resume Coumadin tomorrow after endoscopic evaluation.  -Chart reviewed , discussed with patient again . He states that he has not been on Verapamil   Checked  Echocardiogram - > normal EF  TSH normal at 1.69 . Hypokalemia  - replete    Hypoglycemia,  - has lost a lot of weight  - AM cortisol level checked and normal.  -Blood sugars more stable now,  continue to monitor. Severe protein calorie malnutrition  -Encourage nutritional supplements    Patient with severe weight loss  -Needs endoscopic evaluation    Profound weakness and fatigue   -consulted PT OT   Need SNF    Chronic bilateral lower extremity wounds  -Seen by wound care    Leukopenia, thrombocytopenia  - heme-onc consulted  - likely related to pneumonia   monitor white count. Better today. Platelet count still low at 81    Oncology work-up so far  Hepatitis panel negative  HIV negative  Flow cytometry negative  B12 and folate normal  LINDY negative  Rheumatoid factor negative  Elevated CRP and ferritin levels.-Likely reactive. Hemochromatosis screen pending  Factor VII and lupus anticoagulant pending      COVID-19 ruled out . patient has been vaccinated. Rapid Covid test was negative    Elevated LFTs  -Monitor  So far work-up negative.   Unclear if related to malnutrition  LFTs slightly improved now    DVT Prophylaxis:on warf, INR elevated  Diet: Diet NPO Exceptions are: Ice Chips, Sips of Water with Meds  ADULT DIET; Clear Liquid  Adult Oral Nutrition Supplement; Clear Liquid Oral Supplement  Code Status: Full Code      Dispo - ms  Continue PT OT  He will need SNF placement. Home medications reviewed with the patient and family. patient has not been on Lipitor or verapamil for a while. his Coumadin dose was 10 mg and 5 mg alternating prior to admission. patient was otherwise taking only Lasix, Colace and Flomax.  Not on any antihypertensive medications      Updated patient's wife at bedside with plan of care    Yolanda Pugh MD    9/16/2021

## 2021-09-16 NOTE — PROGRESS NOTES
Bilateral hydronephrosis, likely secondary to markedly distended bladder. Left-sided inguinal hernia containing colon. No resultant bowel obstruction. CT HEAD WO CONTRAST   Final Result   No acute traumatic intracranial abnormality      Atrophy and small-vessel ischemic change      Probable calcified meningioma on the right. XR CHEST PORTABLE   Final Result   Left lower lobe pneumonia           Attending Supervising [de-identified] Attestation Statement  The patient is a 80 y.o. male. I have performed a history and physical examination of the patient. I discussed the case with my physician assistant Kt Bush PA-C    I reviewed the patient's Past Medical History, Past Surgical History, Medications, and Allergies. Physical Exam:  Vitals:    09/15/21 2104 09/16/21 0415 09/16/21 0600 09/16/21 1359   BP: 102/65 122/72  108/71   Pulse: 76 76  72   Resp: 16 16 16   Temp: 98.2 °F (36.8 °C) 97.6 °F (36.4 °C)  98.1 °F (36.7 °C)   TempSrc: Oral Axillary  Oral   SpO2:  94%  94%   Weight:   134 lb 1.6 oz (60.8 kg)    Height:           Physical Examination: General appearance - chronically ill appearing  Mental status - alert, oriented to person, place, and time  Eyes - pupils equal and reactive, extraocular eye movements intact  Neck - supple, no significant adenopathy  Chest - clear to auscultation, no wheezes, rales or rhonchi, symmetric air entry  Heart - normal rate, regular rhythm, normal S1, S2, no murmurs, rubs, clicks or gallops  Abdomen - soft, nontender, nondistended, no masses or organomegaly  Extremities - no pedal edema noted        Impression: 80year old male with a history of HTN, HLD, DM, CHF, atrial fibrillation on Coumadin, and BELKYS on BiPAP admitted with pneumonia and weight loss. Recommendation:  1. Continue supportive care  2. Monitor LFTs - improving   3. Monitor and document output  4. Monitor and replenish electrolytes  5.  Continue broad spectrum

## 2021-09-16 NOTE — PROGRESS NOTES
Bedside Mobility Assessment Tool (BMAT):     Assessment Level 1- Sit and Shake    1. From a semi-reclined position, ask patient to sit up and rotate to a seated position at the side of the bed. Can use the bedrail. 2. Ask patient to reach out and grab your hand and shake making sure patient reaches across his/her midline. Fail- Patient is unable to perform tasks, patient is MOBILITY LEVEL 1. Assessment Level 2- Stretch and Point   1. With patient in seated position at the side of the bed, have patient place both feet on the floor (or stool) with knees no higher than hips. 2. Ask patient to stretch one leg and straighten the knee, then bend the ankle/flex and point the toes. If appropriate, repeat with the other leg. Fail- Patient is unable to complete task. Patient is MOBILITY LEVEL 2. Assessment Level 3- Stand   1. Ask patient to elevate off the bed or chair (seated to standing) using an assistive device (cane, bedrail). 2. Patient should be able to raise buttocks off be and hold for a count of five. May repeat once. Fail- Patient unable to demonstrate standing stability. Patient is MOBILITY LEVEL 3. Assessment Level 4- Walk   1. Ask patient to march in place at bedside. 2. Then ask patient to advance step and return each foot. Some medical conditions may render a patient from stepping backwards, use your best clinical judgement. Fail- Patient not able to complete tasks OR requires use of assistive device. Patient is MOBILITY LEVEL 3.     Pt states he is too weak to try    Mobility Level- 1     Patient is not able to demonstrate the ability to move from a reclining position to an upright position within the recliner due to weakness.

## 2021-09-16 NOTE — PROGRESS NOTES
AM assessment completed, see flow sheet. Pt is alert and oriented. Pt has abrasions and swelling is all 4 extremities. Arm wounds are weeping. Upper Left extremity is +3 pitting edema. Vital signs are WNL. Respirations are even & easy. Lung sounds clear but diminished. No complaints voiced. Pt denies needs at this time. SR up x 2, and bed in low position. Call light is within reach.

## 2021-09-16 NOTE — PLAN OF CARE
Problem: Skin Integrity:  Goal: Will show no infection signs and symptoms  Description: Will show no infection signs and symptoms  9/16/2021 1259 by Theresa Bentley RN  Outcome: Ongoing  9/16/2021 0258 by Bharat Candelaria RN  Outcome: Ongoing  Goal: Absence of new skin breakdown  Description: Absence of new skin breakdown  Outcome: Ongoing     Problem: Falls - Risk of:  Goal: Will remain free from falls  Description: Will remain free from falls  9/16/2021 1259 by Theresa Bentley RN  Outcome: Ongoing  9/16/2021 0258 by Bharat Candelaria RN  Outcome: Ongoing  Goal: Absence of physical injury  Description: Absence of physical injury  Outcome: Ongoing     Problem: Bleeding:  Goal: Will show no signs and symptoms of excessive bleeding  Description: Will show no signs and symptoms of excessive bleeding  9/16/2021 1259 by Theresa Bentley RN  Outcome: Ongoing  9/16/2021 0258 by Bharat Candelaria RN  Outcome: Ongoing     Problem: Nutrition  Goal: Optimal nutrition therapy  9/16/2021 1259 by Theresa Bentley RN  Outcome: Ongoing  9/16/2021 0258 by Bharat Candelaria RN  Outcome: Ongoing  Goal: Understanding of nutritional guidelines  Outcome: Ongoing

## 2021-09-16 NOTE — PROGRESS NOTES
Shift Summary    /65   Pulse 76   Temp 98.2 °F (36.8 °C) (Oral)   Resp 16   Ht 5' 9\" (1.753 m)   Wt 136 lb 12.8 oz (62.1 kg)   SpO2 92%   BMI 20.20 kg/m²     GEN: Patient is alert & oriented with intermittent confusion, speech clear and appropriate. Pain: Denies pain. IV:   RAC IV site clean dry and intact without redness or pain. Receiving intermittent ATB for PNA      Sig Labs:     Monitoring blood glucose and daily INR. 9/15 - 1.52    0400 blood glucose 62, gave 1/2amp dextrose. 0415 Blood glucose 115    CV: HR irregular. On Telemetry:A-fib . generalized edema. Palpable pulses. Lungs: Respirations easy, unlabored with moist cough. On 2L oxygen, denies SOB. Lungs with rhonci t/o. Encouraged C&DB. Has home CPAP at bedside. GI/: No complaints of nausea/vomiting/diarrhea. Abdomen soft, non tender, with bowel sounds. Continent of bowel and bladder. Last BM 9/15. Has aguayo for obstructive uropathy, Urine output tea colored. Skin: Warm and dry. Dressing on Left arm and bilateral lower extremities. Mobility: Up as needed with assist/stedy. Safety: Calls appropriately. Plan of care and safety measures reviewed with the patient. Call light in reach and bed alarm in place. Disposition: planning for endoscopy/colonoscopy when cardio/pulm stable. Leave aguayo in and follow up with urology outpatient.

## 2021-09-17 NOTE — PLAN OF CARE
Problem: Skin Integrity:  Goal: Will show no infection signs and symptoms  Description: Will show no infection signs and symptoms  9/17/2021 1426 by David Dixon RN  Outcome: Ongoing  9/17/2021 0135 by Lela Green RN  Outcome: Ongoing  Goal: Absence of new skin breakdown  Description: Absence of new skin breakdown  9/17/2021 1426 by David Dixon RN  Outcome: Ongoing  9/17/2021 0135 by Lela Green RN  Outcome: Ongoing     Problem: Falls - Risk of:  Goal: Will remain free from falls  Description: Will remain free from falls  9/17/2021 1426 by David Dixon RN  Outcome: Ongoing  9/17/2021 0135 by Lela Green RN  Outcome: Ongoing  Goal: Absence of physical injury  Description: Absence of physical injury  Outcome: Ongoing     Problem: Bleeding:  Goal: Will show no signs and symptoms of excessive bleeding  Description: Will show no signs and symptoms of excessive bleeding  Outcome: Ongoing     Problem: Nutrition  Goal: Optimal nutrition therapy  Outcome: Ongoing  Goal: Understanding of nutritional guidelines  Outcome: Ongoing

## 2021-09-17 NOTE — ANESTHESIA POSTPROCEDURE EVALUATION
Department of Anesthesiology  Postprocedure Note    Patient: Duncan Haley  MRN: [id number]  YOB: 1950  Date of evaluation: 9/17/2021  Time:  4:12 PM     Procedure Summary     Date: 09/17/21 Room / Location: [address] [place] [address] / [facility]    Anesthesia Start: 5362 Anesthesia Stop: 8126    Procedures:       EGD BIOPSY (N/A )      COLONOSCOPY POLYPECTOMY SNARE/COLD BIOPSY (N/A ) Diagnosis: (WEIGHT LOSS)    Surgeons: Zakiya Fulton MD Responsible Provider: Sanjuana Bernardo MD    Anesthesia Type: TIVA ASA Status: 3          Anesthesia Type: TIVA    Nick Phase I: Nick Score: 5    Nick Phase II: Nick Score: 8    Last vitals: Reviewed and per EMR flowsheets.      Vitals:    09/17/21 0709 09/17/21 1413 09/17/21 1557 09/17/21 1602   BP: 110/74 (!) 100/57 113/64 111/68   Pulse: 69 76 66 68   Resp: 16 20 13 14   Temp: 97 °F (36.1 °C) 97.8 °F (36.6 °C) 97.3 °F (36.3 °C) 97.2 °F (36.2 °C)   TempSrc: Axillary Temporal     SpO2: 93% 95% 100% 98%   Weight:       Height:         Anesthesia Post Evaluation    Patient location during evaluation: bedside (pop gluc 50s, gave d50, put pt back on preop d5 inf., repeat 124)  Patient participation: complete - patient participated  Level of consciousness: awake and alert  Airway patency: patent  Nausea & Vomiting: no nausea  Complications: no  Cardiovascular status: hemodynamically stable  Respiratory status: acceptable  Hydration status: euvolemic

## 2021-09-17 NOTE — PROGRESS NOTES
Hospitalist Progress Note      PCP: Cari Underwood MD    Date of Admission: 9/11/2021    Hospital Course:     71-year-old gentleman with history of atrial fibrillation, chronic congestive heart failure, diabetes type 2, history of smoking and remote history of alcohol use  Presenting with profound weakness,progressively getting worse associated with weight loss, progressive worsening LFTs over the last several of months. Now with constipation , bilateral hydronephrosis and  pneumonia. Patient was severely coagulopathic with INR over 14. Persistent recurrent episodes of hypoglycemia  Concern for possible underlying malignancy, however so far work-up has not been revealing. Patient has been seen by GI oncology and urology        Subjective:  Patient is back from endoscopy . Which showed a polyp that was removed no other significant lesions noted. Pinky Angles His LFTs are persistently. .  I had a lengthy discussion with patient and patient's family his wife and daughter at bedside. . Patient is profoundly weak. .  There is no clear diagnosis yet. LFTs elevated. ..    He will need continued PT OT, needs SNF. Pinky Angles   Also needs further work-up probably from a hepatologist or endocrinologist. .        Medications:  Reviewed    Infusion Medications    dextrose 60 mL/hr at 09/17/21 0029    lactated ringers      sodium chloride      dextrose      sodium chloride 25 mL (09/15/21 0010)    dextrose       Scheduled Medications    sodium chloride flush  10 mL IntraVENous 2 times per day    famotidine (PEPCID) injection  20 mg IntraVENous Once    sterile water        pantoprazole  40 mg IntraVENous BID    tamsulosin  0.8 mg Oral Daily    polyethylene glycol  17 g Oral BID    guaiFENesin  400 mg Oral BID    docusate sodium  200 mg Oral BID    bisacodyl  10 mg Rectal Once    doxycycline (VIBRAMYCIN) IV  100 mg IntraVENous Q12H    cefTRIAXone (ROCEPHIN) IV  1,000 mg IntraVENous Q24H    sodium chloride flush  10 mL IntraVENous 2 times per day     PRN Meds: sodium chloride flush, sodium chloride, meperidine, HYDROmorphone, HYDROmorphone, morphine, morphine, oxyCODONE-acetaminophen **OR** oxyCODONE-acetaminophen, ondansetron, glucose, dextrose, glucagon (rDNA), dextrose, sennosides-docusate sodium, ipratropium-albuterol, sodium chloride flush, sodium chloride, potassium chloride, magnesium sulfate, promethazine **OR** ondansetron, acetaminophen **OR** acetaminophen, dextrose      Intake/Output Summary (Last 24 hours) at 9/17/2021 1752  Last data filed at 9/17/2021 0322  Gross per 24 hour   Intake 120 ml   Output 400 ml   Net -280 ml       Physical Exam Performed:    /68   Pulse 68   Temp 97.2 °F (36.2 °C)   Resp 14   Ht 5' 9\" (1.753 m)   Wt 134 lb 1.6 oz (60.8 kg)   SpO2 98%   BMI 19.80 kg/m²     General appearance:   Patient appears malnourished, very ill-appearing. Awake , alert and oriented  appears very cachectic  Multiple areas of bruising and ecchymosis  HEENT: Pupils equal, round, and reactive to light. Conjunctivae/corneas clear. Neck: Supple, with full range of motion. No jugular venous distention. Trachea midline. Respiratory:  Normal respiratory effort. Clear to auscultation, bilaterally without Rales/Wheezes/Rhonchi. Cardiovascular: Irregularly irregular  Abdomen: Soft, non-tender, non-distended with normal bowel sounds. Musculoskeletal: No clubbing, cyanosis . Trace edema feet. Full range of motion without deformity. Skin: Chronic bilateral lower extremity wounds  Multiple areas of bruising and ecchymosis  Neurologic:  Neurovascularly intact without any focal sensory/motor deficits. Cranial nerves: II-XII intact, grossly non-focal.  Psychiatric: No anxiety or confusion today.   Well-oriented    Labs:   Recent Labs     09/15/21  1125   WBC 8.8   HGB 12.1*   HCT 37.1*   PLT 81*     Recent Labs     09/15/21  1125 09/16/21  0553 09/17/21  0545   * 138 133*   K 4.0 3.8 3.8    102 99   CO2 25 28 25   BUN 23* 21* 33*   CREATININE <0.5* <0.5* <0.5*   CALCIUM 8.6 8.3 8.3     Recent Labs     09/16/21  0553 09/17/21  0545   AST 85* 132*   * 166*   BILITOT 0.9 1.0   ALKPHOS 313* 369*     Recent Labs     09/15/21  1125 09/16/21  0553 09/17/21  0545   INR 1.52* 1.38* 1.49*     No results for input(s): Jennie Fucluis in the last 72 hours. Urinalysis:      Lab Results   Component Value Date    NITRU Negative 09/12/2021    WBCUA 0-2 09/12/2021    RBCUA 11-20 09/12/2021    BLOODU SMALL 09/12/2021    SPECGRAV 1.015 09/12/2021    GLUCOSEU Negative 09/12/2021       Radiology:  IR MIDLINE CATH   Final Result   Successful placement of midline. CT CHEST ABDOMEN PELVIS W CONTRAST   Final Result   Multifocal pneumonia, greatest in the left lower lobe, new compared to   09/03/2021. There is a background emphysema and severe coronary artery   disease. Filling defects are seen in the left lower lobe, suggesting   sequelae from mucous plugging or aspiration. Bilateral hydronephrosis, likely secondary to markedly distended bladder. Left-sided inguinal hernia containing colon. No resultant bowel obstruction. CT HEAD WO CONTRAST   Final Result   No acute traumatic intracranial abnormality      Atrophy and small-vessel ischemic change      Probable calcified meningioma on the right. XR CHEST PORTABLE   Final Result   Left lower lobe pneumonia         VL Extremity Venous Bilateral    (Results Pending)     Echocardiogram  Summary   Left ventricular systolic function is reduced with ejection fraction   estimated at 45-51 %. All remaining wall segments appear normal in function. Left ventricular size is decreased. There is moderate concentric left ventricular hypertrophy. Normal left ventricular diastolic filling pressure. The right atrium is mildly dilated. The aortic root is mildly dilated. Mild mitral regurgitation.    Mild-to-moderate aortic regurgitation is present. Mild to moderate tricuspid regurgitation. Normal systolic pulmonary artery pressure (SPAP) estimated at 31 mmHg (RA   pressure 8 mmHg). There is a pleural effusion. 1/15/2016 LVE, LVH, 55%, LAE, RVE, MAYELA        Assessment/Plan:    Multifocal pneumonia  Possible gram-negative infection  Patient has bronchiectasis  History of COPD  - on IV antibiotic -on Rocephin and doxycycline. Day 6/7     Continue  -Continue HHN  Treatment  Oxygen saturations are stable. Patient still coughing up some sputum    Obstructive uropathy  Bilateral hydronephrosis  - Valentine catheter inserted  - Urology consulted . Likely related to severe constipation. Treat as below  -PSA normal   -Continue Flomax. Keep Valentine in. And follow-up with urology as an outpatient    Constipation  - Started on on stool regimen with good response. Patient on Colace and MiraLAX, got suppositories. + BM . Stool for occult blood was negative  -Seen by GI -> S/P  EGD and colonoscopy . EGDs showed severe esophagitis and gastric ulcer-likely HSV or CMV  Colonoscopy had a transverse colon polyp and internal hemorrhoids  Continue PPI      Transaminitis  Worsening LFTs  -Ongoing for several months  -Etiology unclear  -Hepatitis panel negative, LINDY negative  -AFP pending      Supratherapeutic INR  Coagulopathy  -INR over 14  -Likely related to poor nutritional status  -Received multiple doses of oral vitamin K. INR slowly trending down. INR down from 14-> 12 --> 1.5 today. No active bleed   Monitor    A. fib   - on chronic Coumadin-hold as above. INR correcting now likely resume Coumadin tomorrow after endoscopic evaluation.  -Chart reviewed , discussed with patient again . He states that he has not been on Verapamil   Checked  Echocardiogram - > normal EF  TSH normal at 1.69 .     Hypokalemia  - replete    Hypoglycemia,  - has lost a lot of weight  - AM cortisol level checked and normal.  Patient with persistent recurrent episodes of hypoglycemia    Severe protein calorie malnutrition  -Encourage nutritional supplements    Patient with severe weight loss  -Needs endoscopic evaluation    Profound weakness and fatigue   -consulted PT OT   Need SNF    Chronic bilateral lower extremity wounds  -Seen by wound care    Leukopenia, thrombocytopenia  - heme-onc consulted  - likely related to pneumonia   monitor white count. Better today. Platelet count still low at 81    Oncology work-up so far  Hepatitis panel negative  HIV negative  Flow cytometry negative  B12 and folate normal  LINDY negative  Rheumatoid factor negative  Elevated CRP and ferritin levels.-Likely reactive. Hemochromatosis screen pending  Factor VII and lupus anticoagulant pending      COVID-19 ruled out . patient has been vaccinated. Rapid Covid test was negative      DVT Prophylaxis:on warf, INR elevated  Diet: ADULT DIET; Regular  Adult Oral Nutrition Supplement; Standard High Calorie/High Protein Oral Supplement  Code Status: Full Code      Dispo - ms  Continue PT OT  He will need SNF placement. Home medications reviewed with the patient and family. patient has not been on Lipitor or verapamil for a while. his Coumadin dose was 10 mg and 5 mg alternating prior to admission. patient was otherwise taking only Lasix, Colace and Flomax. Not on any antihypertensive medications      Updated patient's wife, dtr  at bedside with plan of care  We discussed all current findings and work-up. Briana Seymour No clear direction about why patient has persistent LFTs and recurrent hypoglycemia. Patient also has intermittent confusion. .  Consider transfer to        Total time today 34 minutes.  > 50 % time dominated by coordination of care and D/W family      Ruth Knutson MD    9/17/2021

## 2021-09-17 NOTE — PROGRESS NOTES
Occupational Therapy Daily Treatment Note    Unit: [address] [place]  Date:  9/17/2021  Patient Name:    Emmanuel Germain  Admitting diagnosis:  General weakness [R53.1]  Elevated brain natriuretic peptide (BNP) level [R79.89]  Supratherapeutic INR [R79.1]  Supratherapeutic international normalized ratio (INR) [R79.1]  Pneumonia due to infectious organism, unspecified laterality, unspecified part of lung [J18.9]  Admit Date:  9/11/2021  Precautions/Restrictions:    Fall risk, Bed/chair alarm, Lines -Valentine catheter, Telemetry and Continuous pulse oximetry      Discharge Recommendations: SNF  DME needs for discharge: defer to facility       Therapy recommendations for staff:   Assist of 2 with use of ANTONIA STEDY for all transfers to/from [facility]  to/from chair    AM-PAC Score: AM-PAC Inpatient Daily Activity Raw Score: 16  Home Health S4 Level: NA       Treatment Time:   13:18-13:43  Treatment number:  4  Timed code treatment minutes: 25 minutes  Total treatment minutes:  25  minutes    History of Present Illness:   per H&P on 9-11-21 Юлия Prasad MD   80 y. o. male history of hypertension hyperlipidemia A. fib, CHF who presents to the emergency department complaining of generalized weakness fatigue, falls. Erica Molina found to have hypoglycemia earlier today with a blood glucose into the 40s.  Former diabetic form of Metformin no longer on medications for this with improved A1c.  Patient has had weight loss in the last month over 15 pounds unintentionally.  Family was concerned over the last 1 month increasing fatigue and difficulty with completing ADLs.  Has had difficulty now getting out of bed in the last 24 hours requiring assistance.  Had a fall down on on the ground but did not hit head.  Is on Coumadin for history of A. fib.  No chest pain no shortness of breath no abdominal pain. Subjective:  Pt in the bed and needing encouragement to participate. Patient incontinent during bed mobility and required pericare.  Transport entering room after pericare was complete. Session discontinued at that time. Pain   Yes  Rating: mild  Location:LE  Pain Medicine Status: No request made      Bed Mobility:   Supine to Sit:  Not Tested  Sit to Supine:  Not Tested  Rolling:           Min A of 2  Scooting:       Not Tested    Transfer Training:   Sit to stand:   Not Tested (transport entering room during session and unable to attempt)  Stand to sit:  NT  Bed to Chair:  NT  Bed to [facility]:   Not Tested  Standard toilet:   Not Tested    Activity Tolerance   Pt completed therapy session with No adverse symptoms noted w/activity    ADL Training:   Upper body dressing: Not Tested  Upper body bathing:  Not Tested  Lower body dressing:  Not Tested  Lower body bathing: Total A  Toileting:   Not Tested  Grooming/Hygiene: Total A    Therapeutic Exercise:   Completed through participation in bed mobility. Patient Education:   Role of OT  Safe RW use/hand placement    Positioning Needs:   Pt in bed, no alarm needed, positioned in proper neutral alignment and pressure relief provided. Family Present:  Yes    Assessment:   Treatment session limited by incontinence and patient leaving for procedure. Patient required more encouragement to participate this date. Per wife, patient is wanting \"to give up\". Encouraged patient to continue to participate in exercises outside of therapy and to change positions as tolerated. Will continue to promote out of bed activity as appropriate. Patient would benefit from SNF at [place] to continue to improve independence. GOALS  To be met in 3 Visits:  1). Bed to toilet/BSC: Mod A  and 2 persons with RW     To be met in 5 Visits:  1). Supine to/from Sit:             Min A   2). Upper Body Bathing:         CGA  3). Lower Body Bathing: Mod A   4). Upper Body Dressing:       Min A   5). Lower Body Dressing: Mod A  and Max A   6).  Pt to demonstrate UE exs x 15 reps with minimal cues      Plan: cont with [address] Stanley Martínez, OTR/L #[id number]        If patient discharges from this facility prior to next visit, this note will serve as the Discharge Summary

## 2021-09-17 NOTE — ANESTHESIA PRE PROCEDURE
Department of Anesthesiology  Preprocedure Note       Name:  Ernestina Joe   Age:  80 y.o.  :  1950                                          MRN:  [id number]         Date:  2021      Surgeon: Danielle Jacobo):  Ernesto Perkins MD    Procedure: Procedure(s):  EGD W/ANES. COLON W/ANES. Medications prior to admission:   Prior to Admission medications    Medication Sig Start Date End Date Taking? Authorizing Provider   tamsulosin (FLOMAX) 0.4 MG capsule Take 0.4 mg by mouth 17  Yes Historical Provider, MD   furosemide (LASIX) 40 MG tablet Take 1 tablet by mouth 2 times daily 12/13/15  Yes Jeannine Mccabe MD   atorvastatin (LIPITOR) 20 MG tablet Take 20 mg by mouth nightly   Yes Historical Provider, MD   benazepril (LOTENSIN) 40 MG tablet Take 40 mg by mouth daily. Yes Historical Provider, MD   warfarin (COUMADIN) 10 MG tablet Take 10 mg by mouth daily.    Yes Historical Provider, MD   docusate sodium (COLACE) 100 MG capsule Take 100 mg by mouth every other day    Yes Historical Provider, MD       Current medications:    Current Facility-Administered Medications   Medication Dose Route Frequency Provider Last Rate Last Admin    dextrose 5 % solution   IntraVENous Continuous June Kilpatrick MD 60 mL/hr at 21 0029 New Bag at 21 0029    sterile water injection             tamsulosin (FLOMAX) capsule 0.8 mg  0.8 mg Oral Daily Yamile Li MD   0.8 mg at 21 1148    polyethylene glycol (GLYCOLAX) packet 17 g  17 g Oral BID June Kilpatrick MD   17 g at 21 2210    glucose (GLUTOSE) 40 % oral gel 15 g  15 g Oral PRN Ahmad A Terra, DO   15 g at 21 1209    dextrose 50 % IV solution  12.5 g IntraVENous PRN Ahmad A Terra, DO   12.5 g at 21 1206    glucagon (rDNA) injection 1 mg  1 mg IntraMUSCular PRN Ahmad A Terra, DO   1 mg at 21 0901    dextrose 5 % solution  100 mL/hr IntraVENous PRN Ahmad A Terra, DO        sennosides-docusate sodium ([facility] I48.91    Diabetes mellitus ([facility] E11.9    Hyperlipidemia E78.5    Urinary retention R33.9    Sleep apnea G47.30    Morbid obesity with BMI of 40.0-44.9, adult (HCC) E66.01, Z68.41    Prolonged pt (prothrombin time) R79.1    Snoring R06.83    Dyspnea R06.00    Chronic atrial fibrillation (HCC) I48.20    Essential hypertension I10    BELKYS treated with BiPAP G47.33    Nocturnal hypoxemia G47.34    Supratherapeutic INR R79.1    General weakness R53.1    Supratherapeutic international normalized ratio (INR) R79.1    Pneumonia due to infectious organism J18.9    Constipation K59.00    Weight loss R63.4       Past Medical History:        Diagnosis Date    Atrial fibrillation (HCC)     CHF [facility]congestive heart failure) ([facility])     Diabetes mellitus ([facility]     Hyperlipidemia     Hypertension     BELKYS treated with BiPAP     Pneumonia due to infectious organism        Past Surgical History:        Procedure Laterality Date    CATARACT REMOVAL WITH IMPLANT Left 2018    EYE SURGERY  12/1/15    right eye cataract    HERNIA REPAIR         Social History:    Social History     Tobacco Use    Smoking status: Former Smoker     Quit date: 3/1/2005     Years since quittin.5    Smokeless tobacco: Never Used   Substance Use Topics    Alcohol use: No     Alcohol/week: 0.0 standard drinks                                Counseling given: Not Answered      Vital Signs (Current):   Vitals:    21 1359 21 0322 21 0709   BP: 108/71 97/70 106/70 110/74   Pulse: 72 93 68 69   Resp: 16 16 16 16   Temp: 98.1 °F (36.7 °C) 97.3 °F (36.3 °C) 96.3 °F (35.7 °C) 97 °F (36.1 °C)   TempSrc: Oral Oral Axillary Axillary   SpO2: 94% 93% 92% 93%   Weight:       Height:                                                  BP Readings from Last 3 Encounters:   21 110/74   18 (!) 101/54   18 (!) 93/56       NPO Status:  mn+< see mar BMI:   Wt Readings from Last 3 Encounters:   09/16/21 134 lb 1.6 oz (60.8 kg)   07/11/18 171 lb (77.6 kg)   05/19/18 174 lb (78.9 kg)     Body mass index is 19.8 kg/m².     CBC:   Lab Results   Component Value Date    WBC 8.8 09/15/2021    RBC 3.85 09/15/2021    HGB 12.1 09/15/2021    HCT 37.1 09/15/2021    MCV 96.3 09/15/2021    RDW 14.5 09/15/2021    PLT 81 09/15/2021       CMP:   Lab Results   Component Value Date     09/17/2021    K 3.8 09/17/2021    CL 99 09/17/2021    CO2 25 09/17/2021    BUN 33 09/17/2021    CREATININE <0.5 09/17/2021    GFRAA >60 09/17/2021    GFRAA >60 08/03/2012    AGRATIO 0.9 09/17/2021    LABGLOM >60 09/17/2021    GLUCOSE 45 09/17/2021    PROT 5.1 09/17/2021    PROT 7.0 08/03/2012    CALCIUM 8.3 09/17/2021    BILITOT 1.0 09/17/2021    ALKPHOS 369 09/17/2021     09/17/2021     09/17/2021       POC Tests:   Recent Labs     09/17/21  1232   POCGLU 118*       Coags:   Lab Results   Component Value Date    PROTIME 17.1 09/17/2021    PROTIME 19.6 12/30/2009    INR 1.49 09/17/2021    APTT 87.8 09/12/2021       HCG (If Applicable): No results found for: PREGTESTUR, PREGSERUM, HCG, HCGQUANT     ABGs:   Lab Results   Component Value Date    PHART 7.303 12/10/2015    PO2ART 96.2 12/10/2015    XPW8YAC 70.8 12/10/2015    DSO2FYG 34.3 12/10/2015    BEART 5.6 12/10/2015    P7HDAIIG 96.4 12/10/2015        Type & Screen (If Applicable):  No results found for: LABABO, LABRH    Drug/Infectious Status (If Applicable):  No results found for: HIV, HEPCAB    COVID-19 Screening (If Applicable):   Lab Results   Component Value Date    COVID19 Not Detected 09/12/2021           Anesthesia Evaluation  Patient summary reviewed and Nursing notes reviewed no history of anesthetic complications:   Airway: Mallampati: II  TM distance: >3 FB   Neck ROM: full  Mouth opening: > = 3 FB Dental:          Pulmonary: breath sounds clear to auscultation  (+) pneumonia (xray/ct at admit):  shortness of breath:  sleep apnea (bipap):      (-) COPD, asthma and not a current smoker          Patient did not smoke on day of surgery. Cardiovascular:    (+) hypertension:, valvular problems/murmurs (ar and tr on echo): MR, dysrhythmias: atrial fibrillation, CHF:, SALCEDO:, hyperlipidemia    (-) pacemaker, CAD and CABG/stent    ECG reviewed  Rhythm: irregular  Rate: normal  Echocardiogram reviewed         Beta Blocker:  Not on Beta Blocker         Neuro/Psych:   (+) neuromuscular disease (gen. weakness):,    (-) seizures, TIA and CVA           GI/Hepatic/Renal:   (+) liver disease (elev lfts):, renal disease (hydro on ct):,      (-) GERD, bowel prep and no morbid obesity       Endo/Other:    (+) DiabetesType II DM, using insulin, blood dyscrasia: thrombocytopenia and anticoagulation therapy, arthritis:., electrolyte abnormalities, no malignancy/cancer. (-) no malignancy/cancer               Abdominal:       Abdomen: soft. Vascular: negative vascular ROS. Other Findings: Poor dentition          Anesthesia Plan      TIVA     ASA 3       Induction: intravenous. MIPS: Prophylactic antiemetics administered. Anesthetic plan and risks discussed with patient. Plan discussed with CRNA. This pre-anesthesia assessment may be used as a history and physical.    DOS STAFF ADDENDUM:    Pt seen and examined, chart reviewed (including anesthesia, drug and allergy history). No interval changes to history and physical examination. Anesthetic plan, risks, benefits, alternatives, and personnel involved discussed with patient. Patient verbalized an understanding and agrees to proceed.       Jelly Christian MD  September 17, 2021  1:59 PM      Jelly Christian MD   9/17/2021

## 2021-09-17 NOTE — PROGRESS NOTES
ONCOLOGY HEMATOLOGY CARE PROGRESS NOTE      SUBJECTIVE:       INR 1.49 today after Vit K. No fever. EGD and c-scope planned today. Had a hypoglycemic episode this AM.     ROS:     10 point ROS completed. Pertinent positives in HPI, otherwise negative. OBJECTIVE        Physical    VITALS:  /74   Pulse 69   Temp 97 °F (36.1 °C) (Axillary)   Resp 16   Ht 5' 9\" (1.753 m)   Wt 134 lb 1.6 oz (60.8 kg)   SpO2 93%   BMI 19.80 kg/m²   TEMPERATURE:  Current - Temp: 97 °F (36.1 °C); Max - Temp  Av.2 °F (36.2 °C)  Min: 96.3 °F (35.7 °C)  Max: 98.1 °F (36.7 °C)  PULSE OXIMETRY RANGE: SpO2  Av %  Min: 92 %  Max: 94 %  24HR INTAKE/OUTPUT:      Intake/Output Summary (Last 24 hours) at 2021 1846  Last data filed at 2021 0322  Gross per 24 hour   Intake 240 ml   Output 1150 ml   Net -910 ml       CONSTITUTIONAL: awake, alert, cooperative, no apparent distress ; temporal wasting   EYES: pupils equal, round and reactive to light, sclera clear and conjunctiva normal  ENT: Normocephalic, without obvious abnormality, atraumatic  NECK: supple, symmetrical, no jugular venous distension and no carotid bruits   HEMATOLOGIC/LYMPHATIC: no cervical, supraclavicular or axillary lymphadenopathy   LUNGS: no increased work of breathing and clear to auscultation   CARDIOVASCULAR: regular rate and rhythm, normal S1 and S2, no murmur noted  ABDOMEN: normal bowel sounds x 4, soft, non-distended, non-tender, no masses palpated, no hepatosplenomgaly   MUSCULOSKELETAL: full range of motion noted, tone is normal  NEUROLOGIC: awake, alert, oriented to name, place and time. Motor skills grossly intact.    SKIN: scattered bruising and skin tears to Bilateral forearms   EXTREMITIES: no LE edema       Data      Recent Labs     09/15/21  1125   WBC 8.8   HGB 12.1*   HCT 37.1*   PLT 81*   MCV 96.3        Recent Labs     09/15/21  1125 21  0553 21  0545   * 138 133*   K 4.0 3.8 3.8    102 99   CO2 25 28 25   BUN 23* 21* 33*   CREATININE <0.5* <0.5* <0.5*     Recent Labs     09/16/21  0553 09/17/21  0545   AST 85* 132*   * 166*   BILITOT 0.9 1.0   ALKPHOS 313* 369*       Magnesium:    Lab Results   Component Value Date    MG 1.90 09/13/2021    MG 2.00 09/12/2021    MG 2.20 09/11/2021         Problem List  Patient Active Problem List   Diagnosis    Atrial fibrillation ([facility]    Diabetes mellitus ([facility]    Hyperlipidemia    Urinary retention    Sleep apnea    Morbid obesity with BMI of 40.0-44.9, adult ([facility]    Prolonged pt (prothrombin time)    Snoring    Dyspnea    Chronic atrial fibrillation (HCC)    Essential hypertension    BELKYS treated with BiPAP    Nocturnal hypoxemia    Supratherapeutic INR    General weakness    Supratherapeutic international normalized ratio (INR)    Pneumonia due to infectious organism    Constipation    Weight loss       ASSESSMENT AND PLAN:    1. Leukopenia/TCP     - WBC stable now at 7.8 and Hgb 13 with plt 103k now   - this is new with normal CBC last month - likely from pneumonia  - marrow signal abnl on recent MRI - finding is non-specific  - Hapto is 107, , CRP high at 178, sed rate is 48, RF slightly high at 15, SPEP no bands ; Iron stores are 40, TIBC 102, and sat is normal at 39 percent. Ferritin is grossly high at 2700 ; Hepatitis is negative, IGG slightly low at 625- likely due to acute infection ; HIV pending, B12 over 2k, Folate is 12 ; LINDY pending, RF slightly high at 1  - request smear  - may need BMBX if worsening  - Flow cytometry is negative  - WBC improved, Plt remain mildly low   - checking hemochromatosis screen with high ferritin but suspect this is reactive - pending     2.  Weight Loss/Cahcexia     - no definite malignancy on CT scan  - bilateral hydronephrosis on CT - urology planning outpatient cystoscopy  - GI consulted - last c-scope 20 years ago; EGD and C scope are planned today     3. Pneumonia     - abx per IM    4.  Coagulopathy   - INR over 14 9/13  - suspect from poor nutrition and pt on warfarin for a fib  - Vitamin K 10 mg PO ordered X 1 dose and a mixing study to assess for underlying inhibitor   - INR checks daily ; likely due to profound malnutrition   - checking Factor VII and lupus anticoagulant based on mixing study results - pending      ONCOLOGIC DISPOSITION: per IM       Polo Solomon MD

## 2021-09-17 NOTE — CARE COORDINATION
INTERDISCIPLINARY PLAN OF CARE CONFERENCE    Date/Time: 9/17/2021 5:08 PM  Completed by: Hung Mcclure RN, Case Management      Patient Name:  Leslie Thao  YOB: 1950  Admitting Diagnosis: General weakness [R53.1]  Elevated brain natriuretic peptide (BNP) level [R79.89]  Supratherapeutic INR [R79.1]  Supratherapeutic international normalized ratio (INR) [R79.1]  Pneumonia due to infectious organism, unspecified laterality, unspecified part of lung [J18.9]     Admit Date/Time:  9/11/2021  3:19 PM    Chart reviewed. Interdisciplinary team contacted or reviewed plan related to patient progress and discharge plans. Disciplines included Case Management, Nursing, and Dietitian. Current Status:ongoing  PT/OT recommendation for discharge plan of care: SNF    Expected D/C Disposition:  Skilled nursing facility  Confirmed plan with patient and/or family Yes confirmed with: (name) pt  Met with:pt  Discharge Plan Comments: Reviewed chart. Role of discharge planner explained and patient verbalized understanding to CAMILO Maradiaga RN, CM. Pt is from home with spouse and plans to go to SNF at [facility]. [facility] is able to accept pt whenever pt is discharged. Precert is waived d/t covid.          Home O2 in place on admit: No  Pt informed of need to bring portable home O2 tank on day of discharge for nursing to connect prior to leaving:  Not Indicated  Verbalized agreement/Understanding:  Not Indicated

## 2021-09-17 NOTE — BRIEF OP NOTE
Brief Postoperative Note      Patient: Morris Briseno  YOB: 1950  MRN: [id number]    Date of Procedure: 9/17/2021    Pre-Op Diagnosis: WEIGHT LOSS    Post-Op Diagnosis: esophagitis, gastric ulcer, colon polyp, internal hemorrhoids. Otherwise normal       Procedure(s):  EGD BIOPSY  COLONOSCOPY POLYPECTOMY SNARE/COLD BIOPSY    Surgeon(s):  Hillary Hopkins MD    Assistant:  * No surgical staff found *    Anesthesia: Monitor Anesthesia Care    Estimated Blood Loss (mL): Minimal    Complications: None    Specimens:   ID Type Source Tests Collected by Time Destination   A :  Tissue Tissue SURGICAL PATHOLOGY Hillary Hopkins MD 9/17/2021 1503    B :  Tissue Tissue SURGICAL PATHOLOGY Hillary Hopkins MD 9/17/2021 1504    C :  Tissue Tissue SURGICAL PATHOLOGY Hillary Hopkins MD 9/17/2021 1505    D :  Tissue Tissue SURGICAL PATHOLOGY Hillary Hopkins MD 9/17/2021 1528        Implants:  * No implants in log *      Drains:   Urethral Catheter (Active)   Catheter Indications Urinary retention (acute or chronic), continuous bladder irrigation or bladder outlet obstruction 09/17/21 1543   Site Assessment Tradewinds 09/14/21 2205   Urine Color Yarelis 09/17/21 1543   Urine Appearance Clear 09/17/21 1543   Output (mL) 100 mL 09/17/21 0322       [REMOVED] Urethral Catheter  (Removed)   Catheter Indications Assist in healing of open sacral or perineal wounds (Stage III, IV, or unstageable documented by a provider or enterostomal therapy) and/or full thickness perineal and lower extremity burns in incontinent patients 09/11/21 1848   Urine Color Yellow 09/12/21 0442   Urine Appearance Clear 09/12/21 0442   Output (mL) 300 mL 09/12/21 1345       Findings: esophagitis, gastric ulcer, colon polyp, internal hemorrhoids. Otherwise normal    Recommendation  1. Continue supportive care  2. PPI BID x 8wks  3. Await pathology  4. Check HIV  5. Avoid NSAIDs  6.  Encourage nutrition with supplements    Electronically signed by Lexii Salgado MD on 9/17/2021 at 3:46 PM

## 2021-09-17 NOTE — PROGRESS NOTES
Inpatient Physical Therapy Daily Treatment Note    Unit: [address] [place]  Date:  9/17/2021  Patient Name:    Billy Porras  Admitting diagnosis:  General weakness [R53.1]  Elevated brain natriuretic peptide (BNP) level [R79.89]  Supratherapeutic INR [R79.1]  Supratherapeutic international normalized ratio (INR) [R79.1]  Pneumonia due to infectious organism, unspecified laterality, unspecified part of lung [J18.9]  Admit Date:  9/11/2021  Precautions/Restrictions/WB Status/ Lines/ Wounds/ Oxygen: Fall risk, Bed/chair alarm, Lines -Valentine catheter, Telemetry and Continuous pulse oximetry    Treatment Time: 2146-2136   Treatment Number:  3  Timed Code Treatment Minutes: 25 minutes  Total Treatment Minutes:  25 minutes    Patient Goals for Therapy: \" to sit up in a chair \"          Discharge Recommendations: SNF  DME needs for discharge: defer to facility       Therapy recommendation for EMS Transport: requires transport by cot due to need for lift equipment to safely transfer    Therapy recommendations for staff:   Assist of 2 for bed mobility and sitting EOB. History of Present Illness: Per H&P Dr. Jose Marion 9/11: \"79 y.o. male who presented to [facility] with past medical history of A. fib on Coumadin, HFpEF, type 2 diabetes, hypertension, hyperlipidemia, BELKYS on BiPAP presented to the ED for worsening fatigue unintentional weight loss and inability ambulate.     Patient reported he has lost almost 10 to 20 pounds unintentionally from previous. Patient in the picture in the chart looks much more skinnier malnourished anorexic. Patient reported that he has been having appropriate appetite eating and drinking and has been having increased cough no phlegm but does report shortness of breath on exertion. Alleviated with rest.  Patient reports that he was seen his primary care and was reported that he needs to be follow-up with GI and heme-onc.   Patient's daughter is a pediatrician physician and I also spoke on the phone to discuss care. Patient does not currently smoke drink however does have extensive 20+ pack years of smoking in addition to drinking. Patient also more than 20 years from the time where he needs a colonoscopy. Patient also reports been having intermittent diarrhea and constipation no known alleviating exacerbating factor as well. No reported bleeding. Patient also reports that he has lower extremity edema that is progressive despite putting Ace wrap in addition has a lesion on the right foot which the patient does not take care of nor follow-up. CODE STATUS discussed and the patient is a full code. \"    Home Health S4 Level Recommendation:  NA  AM-PAC Mobility Score    AM-PAC Inpatient Mobility Raw Score : 10  AM-PAC Inpatient Mobility without Stair Climbing Raw Score : 9    Pain   Yes  Location: \"the usual places\"  Rating: mild /10   Pain Medicine Status: No request made    Cognition    A&O x4   Able to follow 2 step commands    Subjective  Patient lying semifowlers in bed with spouse present. Pt says earlier today he was interested in getting up to chair but now he is not. He is tired from being up all night with incontinence and wants to do bed level exercise only. With light encouragement he is agreeable to attempt sitting up to EOB for balance activities. Feels like he is getting weaker. Has been doing some bed level exercises on his own. Balance  Sitting:  Not tested; N/A   Comments: N/A    Standing: Not tested; Not Tested  Comments:  N/A    Bed Mobility   Supine to Sit:    Not Tested  Sit to Supine:   Not Tested   Rolling:   Min A  and 2 persons, both directions  Scooting in sitting: Not Tested   Scooting in supine:  Not Tested     Transfer Training     Sit to stand:   Not Tested   Stand to sit:   Not Tested   Bed to Chair:  Not Tested with use of N/A. Gait gait deferred due to unsafe to attempt; pt ambulated 0 ft.    Distance:      NA ft  Deviations (firm surface/linoleum):  N/A  Assistive Device Used:    N/A  Level of Assist:    N/A  Comment: NA    Stair Training deferred, pt unsafe/ not appropriate to complete stairs at this time    Activity Tolerance   Pt completed therapy session with No adverse symptoms noted w/activity    Positioning Needs   Pt in bed, no alarm needed, positioned in proper neutral alignment and pressure relief provided. Spouse in room. Transport in room preparing to take pt for EGD and colonoscopy. Exercises Initiated  Estrella deferred secondary to treatment focus on functional mobility  NA   Discussed with pt and pt's wife that exercises may best be done without theraband previously given, due to extreme fatigue and noted additional fatigue affecting functional mobility after completing exercises with theraband 2 days ago. Other  Additional/new skin tear sites and bruises noted on bilat LEs. Caution with bed mobility required. Patient/Family Education   Pt educated on role of inpatient PT, POC, importance of continued activity, transfer techniques. Assessment  Pt seen for Physical Therapy follow up treatment. This session fairly limited. Intent was to help pt up to EOB sitting for balance/trunk control activities but pt needed assist with depends change and cleanup. He rolled both directions with ~min A x 2 and therapist changed soiled mepelex. Transport then arrived to take pt for EGD and colonoscopy. Discussed ongoing POC/goals for therapy in coming days. Pt generally appearing progressively weaker over course of this week. Recommending SNF upon discharge as patient functioning well below baseline, demonstrates good rehab potential and unable to return home due to inability to negotiate stairs to enter home/bedroom/bathroom, burden of care beyond caregiver ability and home environment not conducive to patient recovery. Goals : To be met in 3 visits:  1). Independent with LE Ex x 10 reps    To be met in 6 visits:  1).   Supine to/from sit: Supervision  2). Sit to/from stand: SBA  3). Bed to chair: SBA  4). Gait: Establish goal after trial.  5). Tolerate B LE exercises 3 sets of 10-15 reps    Rehabilitation Potential: Good  Strengths for achieving goals include:   Pt motivated, PLOF, Family Support and Pt cooperative   Barriers to achieving goals include:    Complexity of condition    Plan    To be seen 3-5 x / week  while in acute care setting for therapeutic exercises, bed mobility, transfers, progressive gait training, balance training, and family/patient education. Signature: Kahlil Cristina, PT, DPT    If patient discharges from this facility prior to next visit, this note will serve as the Discharge Summary.

## 2021-09-17 NOTE — PROGRESS NOTES
Comprehensive Nutrition Assessment    Type and Reason for Visit:  Reassess    Nutrition Recommendations/Plan:   1. Continue Regular diet  2/ Continue Ensure Enlive TID    Nutrition Assessment:    Pt improving from a nutritional standpoint AEB he hs been accepting the regular diet and ensure enlive . Remains at risk for further nutritional compromise r/t he has severe wasting of muscle. Will continue current diet and supps       Malnutrition Assessment:  Malnutrition Status:  Severe malnutrition    Context:  Acute Illness     Findings of the 6 clinical characteristics of malnutrition:  Energy Intake:  No significant decrease in energy intake (patient has been eating his normal)  Weight Loss:  No significant weight loss (4% or -4.7#  x 3 months PTA, however suspect there has been additional weight loss d/t +2 pitting edema when current weight was taken)     Body Fat Loss:  7 - Moderate body fat loss Buccal region, Triceps, Orbital   Muscle Mass Loss:  7 - Moderate muscle mass loss Temples (temporalis), Clavicles (pectoralis & deltoids), Thigh (quadraceps), Calf (gastrocnemius)  Fluid Accumulation:  1 - Mild Extremities, Generalized (BLE + 2 pitting edema; generalized edema)   Strength:       Estimated Daily Nutrient Needs:  Energy (kcal):  0820-2993 kcals based on 28-30 kcals/kg/CBW; Weight Used for Energy Requirements:  Current     Protein (g):  74-87 g protein based on 1.2-1.4 g/kg/CBW (unsure of wounds at this time);  Weight Used for Protein Requirements:  Current        Fluid (ml/day):  9602-0264 ml; Method Used for Fluid Requirements:  1 ml/kcal      Nutrition Related Findings:  EGD and colonoscopytoday; pt was lyingin bed with daughter at bedside assiting to feed him; he is cachetic in apperance; below eyes are large pockets filled with fluid; arms are edematous; RN was dressing LE wounds; IVFS infusing; O2/NC; BM today      Wounds:  Partial Thickness (partial thickness skin loss on right achilles per wound notes)       Current Nutrition Therapies:    ADULT DIET; Regular  Adult Oral Nutrition Supplement; Standard High Calorie/High Protein Oral Supplement    Anthropometric Measures:  · Height: 5' 9\" (175.3 cm)  · Current Body Weight: 134 lb 1.6 oz (60.8 kg)   · Admission Body Weight: 136 lb 4.8 oz (61.8 kg)    · Usual Body Weight: 142 lb (64.4 kg) (obtained 6/29/2021 per past encounters; pt wife reports this was a standing scale weight)     · Ideal Body Weight: 160 lbs; % Ideal Body Weight 85.2 %   · BMI: 19.8  · Adjusted Body Weight:  ; No Adjustment   · BMI Categories: Underweight (BMI less than 22) age over 72       Nutrition Diagnosis:   · Severe malnutrition related to inadequate protein-energy intake, other (comment) as evidenced by severe muscle loss, severe loss of subcutaneous fat, lab values, weight loss, BMI, localized or generalized fluid accumulation, other (comment), poor intake prior to admission (possible GI dysfunction, noted need to r/o GI neoplasm in outpatient EGD/colonscopy)      Nutrition Interventions:   Food and/or Nutrient Delivery:  Continue Current Diet, Continue Oral Nutrition Supplement  Nutrition Education/Counseling:  No recommendation at this time   Coordination of Nutrition Care:  Continue to monitor while inpatient    Goals:  patient will consume 75% or greater of meals on ADULT DIET; Regular       Nutrition Monitoring and Evaluation:   Behavioral-Environmental Outcomes:  None Identified   Food/Nutrient Intake Outcomes:  Food and Nutrient Intake, Supplement Intake, IVF Intake  Physical Signs/Symptoms Outcomes:  Fluid Status or Edema, Nutrition Focused Physical Findings, Weight     Discharge Planning:     Too soon to determine     Electronically signed by Donna Marmolejo RD, LD on 9/17/21 at 6:26 PM EDT    Contact: [phone]

## 2021-09-17 NOTE — PROGRESS NOTES
Bowel prep started pt very hesitant on drinking all the prep. Pt IV infiltrated. Placed pts arm on pillow to help with swelling.  Will make clinical aware of IV needs d/t pt being swollen and weeping from bilateral arms

## 2021-09-17 NOTE — H&P
History and Physical / Pre-Sedation Assessment    Patient:  Emmanuel Germain   :   1950     Intended Procedure:  EGD+Colon    HPI: 80year old male with a history of HTN, HLD, DM, CHF, atrial fibrillation on Coumadin, and BELKYS on BiPAP admitted with pneumonia and weight loss    Past Medical History:   Diagnosis Date    [facility]     CHF [facility]congestive heart failure) [facility]     Diabetes mellitus [facility]     Hyperlipidemia     Hypertension     BELKYS treated with BiPAP     Pneumonia due to infectious organism      Past Surgical History:   Procedure Laterality Date    CATARACT REMOVAL WITH IMPLANT Left 2018    EYE SURGERY  12/1/15    right eye cataract    HERNIA REPAIR         Medications reviewed  Prior to Admission medications    Medication Sig Start Date End Date Taking? Authorizing Provider   tamsulosin (FLOMAX) 0.4 MG capsule Take 0.4 mg by mouth 17  Yes Historical Provider, MD   furosemide (LASIX) 40 MG tablet Take 1 tablet by mouth 2 times daily 12/13/15  Yes Pop Youssef MD   atorvastatin (LIPITOR) 20 MG tablet Take 20 mg by mouth nightly   Yes Historical Provider, MD   benazepril (LOTENSIN) 40 MG tablet Take 40 mg by mouth daily. Yes Historical Provider, MD   warfarin (COUMADIN) 10 MG tablet Take 10 mg by mouth daily. Yes Historical Provider, MD   docusate sodium (COLACE) 100 MG capsule Take 100 mg by mouth every other day    Yes Historical Provider, MD        Allergies: No Known Allergies    Nurses notes reviewed and agreed.     Physical Exam:  Vital Signs: BP (!) 100/57   Pulse 76   Temp 97.8 °F (36.6 °C) (Temporal)   Resp 20   Ht 5' 9\" (1.753 m)   Wt 134 lb 1.6 oz (60.8 kg)   SpO2 95%   BMI 19.80 kg/m²    Airway: Mallampati: II (soft palate, uvula, fauces visible)  Pulmonary:Normal  Cardiac:Normal  Abdomen:Normal    Pre-Procedure Assessment / Plan:  ASA: Class 3 - A patient with severe systemic disease that limits activity but is not incapacitating  Level of Sedation Plan: Moderate sedation  Post Procedure plan: Return to same level of care    I assessed the patient and find that the patient is in satisfactory condition to proceed with the planned procedure and sedation plan. I have explained the risk, benefits, and alternatives to the procedure; the patient understands and agrees to proceed.        Tanesha Cartwright MD  9/17/2021

## 2021-09-17 NOTE — PROGRESS NOTES
Bedside report given to [facility]  pt in stable condition no needs at this time.  Call light within reach

## 2021-09-17 NOTE — PROGRESS NOTES
Casey Bautista, RN informed this RN face to face of critical lab call with glucose of 45. PCA Willama also reported POC glucose of 65. Pt is NPO for procedure and also lost IV access last night, needs PICC line. Gustavo Johnston, radiology RN informed, will try to fit patient in this morning, unsure of what time he will be able to. No needs at this time. Will continue to monitor.

## 2021-09-17 NOTE — FLOWSHEET NOTE
09/17/21 1204   Encounter Summary   Services provided to: Patient and family together   Referral/Consult From: Nurse   Support System Family members   Continue Visiting   (9-17, declined, request followup another time.)   Complexity of Encounter Low   Length of Encounter 15 minutes   Routine   Type Initial   Assessment Calm; Approachable;Passive   Intervention Active listening   Outcome Refused/declined    called to room at patient request.  On arrival he states he did not want to visit at this time and requested another day. He is going for a procedure this afternoon. Offered prayer and follow-up another time. I told him he could have the nurse page us if he wants to talk sooner.

## 2021-09-17 NOTE — FLOWSHEET NOTE
09/16/21 2005   Vital Signs   Temp 97.3 °F (36.3 °C)   Temp Source Oral   Pulse 93   Heart Rate Source Monitor   Resp 16   BP 97/70   Level of Consciousness Alert (0)   MEWS Score 2   Oxygen Therapy   SpO2 93 %   O2 Device None (Room air)   Pt A/O x 4 assessment completed. Meds given per MAR. Consent for EGD and Colonoscopy signed and placed in chart. Pt informed of NPO status at 0500. Pt denies any needs at this time.  Call light within reach and bed alarm on

## 2021-09-18 NOTE — DISCHARGE INSTR - COC
Continuity of Care Form    Patient Name: Olegario Motta   :  1950  MRN:  [id number]    Admit date:  2021  Discharge date:  ***    Code Status Order: Full Code   Advance Directives:   Advance Care Flowsheet Documentation       Date/Time Healthcare Directive Type of Healthcare Directive Copy in [address] Agent's Name Healthcare Agent's Phone Number    21 1421  No, patient does not have an advance directive for healthcare treatment  --  --  --  --  --            Admitting Physician:  Soraya Jacobson DO  PCP: Ketan Crane MD    Discharging Nurse: [facility] Unit/Room#: 0210/0210-01  Discharging Unit Phone Number: ***    Emergency Contact:   Extended Emergency Contact Information  Primary Emergency Contact: [facility]  Address: [address] [place] [address] Phone: [phone]  Work Phone: [phone]  Relation: Spouse  Secondary Emergency Contact: Devendra Keene [address] Phone: [phone]  Relation: Child    Past Surgical History:  Past Surgical History:   Procedure Laterality Date    CATARACT REMOVAL WITH IMPLANT Left 2018    COLONOSCOPY  2021    [place] Colon polyp    EYE SURGERY  12/1/15    right eye cataract    HERNIA REPAIR      IR MIDLINE CATH  2021    IR MIDLINE CATH 2021 [facility] SPECIAL PROCEDURES    UPPER GASTROINTESTINAL ENDOSCOPY  2021    Gastric Ulcer; Esophagitis;  Bx Duodenum, gastric, esophagus       Immunization History:   Immunization History   Administered Date(s) Administered    COVID-19, Moderna, PF, 100mcg/0.5mL 2021, 2021    Influenza Virus Vaccine 10/01/2017       Active Problems:  Patient Active Problem List   Diagnosis Code    Atrial fibrillation ([address] I48.91    Diabetes mellitus ([address] E11.9    Hyperlipidemia E78.5    Urinary retention R33.9    Sleep apnea G47.30    Morbid obesity with BMI of 40.0-44.9, adult ([facility] E66.01, Z68.41    Prolonged pt (prothrombin time) R79.1    Snoring R06.83    Dyspnea R06.00    Chronic atrial fibrillation (HCC) I48.20    Essential hypertension I10    BELKYS treated with BiPAP G47.33    Nocturnal hypoxemia G47.34    Supratherapeutic INR R79.1    General weakness R53.1    Supratherapeutic international normalized ratio (INR) R79.1    Pneumonia due to infectious organism J18.9    Constipation K59.00    Weight loss R63.4       Isolation/Infection:   Isolation            No Isolation          Patient Infection Status       Infection Onset Added Last Indicated Last Indicated By Review Planned Expiration Resolved Resolved By    None active    Resolved    COVID-19 Rule Out 09/11/21 09/11/21 09/12/21 COVID-19 (Ordered)   09/13/21 Ella Culp RN    COVID-19 Rule Out 09/11/21 09/11/21 09/11/21 COVID-19, Rapid (Ordered)   09/11/21 Rule-Out Test Resulted            Nurse Assessment:  Last Vital Signs: /74   Pulse 67   Temp 96.8 °F (36 °C) (Oral)   Resp 18   Ht 5' 9\" (1.753 m)   Wt 134 lb 1.6 oz (60.8 kg)   SpO2 96%   BMI 19.80 kg/m²     Last documented pain score (0-10 scale): Pain Level: 0  Last Weight:   Wt Readings from Last 1 Encounters:   09/16/21 134 lb 1.6 oz (60.8 kg)     Mental Status:  {IP PT MENTAL STATUS:20030:::0}    IV Access:  { GIOVANNI IV ACCESS:973629270:::0}    Nursing Mobility/ADLs:  Walking   {CHP DME ADLs:898227157:::0}  Transfer  {CHP DME ADLs:199793212:::0}  Bathing  {CHP DME ADLs:208336812:::0}  Dressing  {CHP DME ADLs:307652887:::0}  Toileting  {CHP DME ADLs:204418547:::0}  Feeding  {CHP DME ADLs:374387517:::0}  Med Admin  {CHP DME ADLs:184968479:::0}  Med Delivery   {MH GIOVANNI MED Delivery:817383793:::0}    Wound Care Documentation and Therapy:        Elimination:  Continence:    Bowel: {YES / VN:59976}  Bladder: {YES / DF:19516}  Urinary Catheter: {Urinary Catheter:588710003:::0}   Colostomy/Ileostomy/Ileal Conduit: {YES / IY:76342}       Date of Last BM: ***    Intake/Output Summary (Last 24 hours) at 2021 1350  Last data filed at 2021 0644  Gross per 24 hour   Intake 979 ml   Output 250 ml   Net 729 ml     I/O last 3 completed shifts:   In: 18 [I.V.:979]  Out: 250 [Urine:250]    Safety Concerns:     [address] GIOVANNI Safety Concerns:291960928:::0}    Impairments/Disabilities:      [address] GIOVANNI Impairments/Disabilities:165836818:::0}    Nutrition Therapy:  Current Nutrition Therapy:   [address] GIOVANNI Diet List:111572009:::0}    Routes of Feeding: {CHP DME Other Feedings:118413934:::0}  Liquids: {Slp liquid thickness:17319}  Daily Fluid Restriction: {CHP DME Yes amt example:132226146:::0}  Last Modified Barium Swallow with Video (Video Swallowing Test): {Done Not Done JLIW:450498969:::1}    Treatments at the Time of Hospital Discharge:   Respiratory Treatments: ***  Oxygen Therapy:  {Therapy; copd oxygen:84577:::0}  Ventilator:    { CC Vent List:614201234:::0}    Rehab Therapies: {THERAPEUTIC INTERVENTION:4295137681}  Weight Bearing Status/Restrictions: { CC Weight Bearin:::0}  Other Medical Equipment (for information only, NOT a DME order):  {EQUIPMENT:087422011}  Other Treatments: ***    Patient's personal belongings (please select all that are sent with patient):  {CHP DME Belongings:941420023:::0}    RN SIGNATURE:  {Esignature:800114209:::0}    CASE MANAGEMENT/SOCIAL WORK SECTION    Inpatient Status Date: ***    Readmission Risk Assessment Score:  Readmission Risk              Risk of Unplanned Readmission:  18           Discharging to Facility/ Agency   Name:   Address:  Phone:  Fax:    Dialysis Facility (if applicable)   Name:  Address:  Dialysis Schedule:  Phone:  Fax:    / signature: {Esignature:043479564:::0}    PHYSICIAN SECTION    Prognosis: Fair    Condition at Discharge: Stable    Rehab Potential (if transferring to Rehab): Good    Recommended Labs or Other Treatments After Discharge:     Physician Certification: I certify the above information and transfer of Talat Gomez  is necessary for the continuing treatment of the diagnosis listed and that he requires [facility] for less 30 days.      Update Admission H&P: No change in H&P    PHYSICIAN SIGNATURE:  Electronically signed by Alissa Hall MD on 9/18/21 at 1:50 PM EDT

## 2021-09-18 NOTE — PROGRESS NOTES
Bedside Mobility Assessment Tool (BMAT):     Assessment Level 1- Sit and Shake    1. From a semi-reclined position, ask patient to sit up and rotate to a seated position at the side of the bed. Can use the bedrail. 2. Ask patient to reach out and grab your hand and shake making sure patient reaches across his/her midline. Fail- Patient is unable to perform tasks, patient is MOBILITY LEVEL 1. Assessment Level 2- Stretch and Point   1. With patient in seated position at the side of the bed, have patient place both feet on the floor (or stool) with knees no higher than hips. 2. Ask patient to stretch one leg and straighten the knee, then bend the ankle/flex and point the toes. If appropriate, repeat with the other leg. Fail- Patient is unable to complete task. Patient is MOBILITY LEVEL 2. Assessment Level 3- Stand   1. Ask patient to elevate off the bed or chair (seated to standing) using an assistive device (cane, bedrail). 2. Patient should be able to raise buttocks off be and hold for a count of five. May repeat once. Fail- Patient unable to demonstrate standing stability. Patient is MOBILITY LEVEL 3. Assessment Level 4- Walk   1. Ask patient to march in place at bedside. 2. Then ask patient to advance step and return each foot. Some medical conditions may render a patient from stepping backwards, use your best clinical judgement. Fail- Patient not able to complete tasks OR requires use of assistive device. Patient is MOBILITY LEVEL 3.        Mobility Level- 1     Patient refusing to be assessed

## 2021-09-18 NOTE — PROGRESS NOTES
PROGRESS NOTE  S:71 yrs Patient  admitted on 9/11/2021 with General weakness [R53.1]  Elevated brain natriuretic peptide (BNP) level [R79.89]  Supratherapeutic INR [R79.1]  Supratherapeutic international normalized ratio (INR) [R79.1]  Pneumonia due to infectious organism, unspecified laterality, unspecified part of lung [J18.9] . Severe esophagitis. Current Hospital Schedued Meds   sodium chloride flush  10 mL IntraVENous 2 times per day    famotidine (PEPCID) injection  20 mg IntraVENous Once    pantoprazole  40 mg IntraVENous BID    tamsulosin  0.8 mg Oral Daily    polyethylene glycol  17 g Oral BID    guaiFENesin  400 mg Oral BID    docusate sodium  200 mg Oral BID    bisacodyl  10 mg Rectal Once    doxycycline (VIBRAMYCIN) IV  100 mg IntraVENous Q12H    cefTRIAXone (ROCEPHIN) IV  1,000 mg IntraVENous Q24H    sodium chloride flush  10 mL IntraVENous 2 times per day     Current Hospital IV Meds   dextrose 60 mL/hr at 09/17/21 0029    sodium chloride      dextrose      sodium chloride 25 mL (09/15/21 0010)    dextrose       Current Hospital PRN Meds  sodium chloride flush, sodium chloride, meperidine, HYDROmorphone, HYDROmorphone, morphine, morphine, glucose, dextrose, glucagon (rDNA), dextrose, sennosides-docusate sodium, ipratropium-albuterol, sodium chloride flush, sodium chloride, potassium chloride, magnesium sulfate, promethazine **OR** ondansetron, acetaminophen **OR** acetaminophen, dextrose    Exam:   Vitals:    09/18/21 1342   BP: 116/74   Pulse: 67   Resp: 18   Temp: 96.8 °F (36 °C)   SpO2: 96%     I/O last 3 completed shifts:   In: 979 [I.V.:979]  Out: 250 [Urine:250]   General appearance: alert, appears stated age and cooperative  HEENT: PERRLA  Neck: no adenopathy, no carotid bruit, no JVD, supple, symmetrical, trachea midline and thyroid not enlarged, symmetric, no tenderness/mass/nodules  Lungs: clear to auscultation bilaterally  Heart: regular rate and rhythm, S1, S2 normal, no murmur, click, rub or gallop  Abdomen: soft, non-tender; bowel sounds normal; no masses,  no organomegaly  Extremities: extremities normal, atraumatic, no cyanosis or edema     Labs:  CBC: No results for input(s): WBC, HGB, HCT, MCV, PLT in the last 72 hours. BMP:   Recent Labs     09/16/21  0553 09/17/21  0545 09/18/21  0550    133* 132*   K 3.8 3.8 3.7    99 98*   CO2 28 25 29   BUN 21* 33* 39*   CREATININE <0.5* <0.5* <0.5*     LIVER PROFILE:   Recent Labs     09/16/21  0553 09/17/21  0545 09/18/21  0550   AST 85* 132* 188*   * 166* 160*   PROT 5.0* 5.1* 4.7*   BILITOT 0.9 1.0 0.8   ALKPHOS 313* 369* 394*     PT/INR:   Recent Labs     09/16/21  0553 09/17/21  0545 09/18/21  0550   INR 1.38* 1.49* 1.85*       IMAGING:  IR MIDLINE CATH    Result Date: 9/17/2021  EXAMINATION: LIMITED ULTRASOUND OF THE ARM FOR PICC ACCESS, 9/17/2021 TECHNIQUE: The PICC team used ultrasound and VPS guidance to place a PICC line. HISTORY: ORDERING SYSTEM PROVIDED HISTORY: Limited access TECHNOLOGIST PROVIDED HISTORY: Reason for exam:->Limited access How many lumens are being requested?->2 What site is the preferred site?->Other (Comment) What side should this line be placed? ->Either Reason for Exam: limited access Additional signs and symptoms: 15cm dual lumen non tunneled right brachial midline US guidance FLUOROSCOPY DOSE AND TYPE OR TIME AND EXPOSURES: N/a FINDINGS: Ultrasound images demonstrate patency of the right brachial vein which was used for access for placement of a midline by the PICC team, without a radiologist present. VPS guidance was used by the PICC team By report, a 15 cm dual-lumen midline catheter was placed. Successful placement of midline.         Hospital Problems         Last Modified POA    Supratherapeutic INR 9/18/2021 Yes    General weakness 9/18/2021 Yes    Supratherapeutic international normalized ratio (INR) 9/18/2021 Yes    Pneumonia due

## 2021-09-18 NOTE — PROGRESS NOTES
Patient refused post D 50 accu check earlier. Refusing to have his blood drawn via venipuncture and via mid line. Discussed having another nurse draw it; continues to refuse and then placed his arm above his head. Discussion continued and patient finally put his arm back down and this writer was able to begin the process of drawing from the mid line. Patient then began thrashing his arm about as the syringe was in the port and ended up purposely hitting this writer in the head. He seemed to settle enough after this and the blood draw/accu check was completed. When asked where he was at, patient either was unable or unwilling to answer at this time. Call light in reach; will continue to monitor.

## 2021-09-18 NOTE — PROGRESS NOTES
For the IR BIOPSY LIVER PERCUTANEOUS (Order #[id number]) on 9/18/21 I called picc team and lvm for  The order to be done on Monday 9/20/21 and called radiology and spoke with jennifer about this order and she said to call [facility] to make sure they schedule this test for Monday 9/20/21 .  Dr Mirian Coronel said they didn't need to do test til Monday

## 2021-09-18 NOTE — PROGRESS NOTES
Shift assessment completed at this time. Repositioned for comfort and attempted to place everything as it was but patient became irritated stating that things were not to his satisfaction. He called his daughter and spouse enquiring for them to come in because things were not where they needed to be. Respiratory therapist Nia Howard is at bedside as well and trying to apply c pap. Patient is still dissatisfied but unable to decide what he wants as well. Call left in reach; table and dresser table both at bedside with water, glasses, tissues in reach.

## 2021-09-18 NOTE — FLOWSHEET NOTE
09/18/21 1126   Vital Signs   Temp 96.7 °F (35.9 °C)   Temp Source Oral   Pulse 63   Heart Rate Source Monitor   Resp 18   /70   BP Location Left upper arm   Patient Position Semi fowlers   Level of Consciousness Alert (0)   MEWS Score 1   Patient Currently in Pain Denies   Pain Assessment   Pain Assessment 0-10   RASS Score +1   POSS Score (Patient Ctrl Analgesia) Awake and Alert   Pain Level 0   Oxygen Therapy   SpO2 97 %   O2 Device None (Room air)     Patient allowed PCA to take vitals when daughter arrived and allowed patient care. New orders to D/C aguayo and do ua/culture and ammonia level. Patient is alert and oriented x4 at this time. Preventative dressing c/d/i to mid spine and mepilix to sacrum c/d/i and remains a stage 1. No s/s distress noted.

## 2021-09-18 NOTE — PROGRESS NOTES
Patient refused VS by PCA and nurse. Patient took cpap off and refusing PO meds this morning. Patient states \"I  last night and the Katie Grand brought me back so I don't need them accoring to God. \"  Bed alarm on, low position, Locked and IVF running as ordered. Midline in place. Taking tele and hitting it on bedrail.

## 2021-09-18 NOTE — PROGRESS NOTES
Blood sugar 73; patient refusing to drink OJ or other food/drink. He is alert and talking; discussed that his BS may continue to go lower but he is adamantly refusing. Will recheck BS shortly.   Call light in reach

## 2021-09-18 NOTE — PROGRESS NOTES
Patient refused to have dressing changed to RLE. Daughter at bedside. R/b/c. Explained. Verbalized understanding.

## 2021-09-18 NOTE — PROGRESS NOTES
Hospitalist Progress Note      PCP: Shirin Lai MD    Date of Admission: 9/11/2021    Hospital Course:     67-year-old gentleman with history of atrial fibrillation, chronic congestive heart failure, diabetes type 2, history of smoking and remote history of alcohol use  Presenting with profound weakness, progressively getting worse associated with weight loss, progressive worsening LFTs over the last several of months. Now with constipation , bilateral hydronephrosis and  pneumonia. Patient was severely coagulopathic with INR over 14. Persistent recurrent episodes of hypoglycemia  Concern for possible underlying malignancy, however so far work-up has not been revealing. Patient has been seen by GI,  oncology and urology    S/P EGD, colonoscopy -  showed severe esophagitis and gastric ulcer-likely HSV or CMV  Colonoscopy had a transverse colon polyp and internal hemorrhoids    His LFTs are persistently elevated  . Patient is profoundly weak- seen by PT, OT . Needs SNF      Subjective:    9/18  Significant issues with confusion through the night. Ammonia normal . UA Neg . Patient is currently awake, alert and well-oriented. Patient daughter is at bedside and I spoke to her. dtr is a pediatrician . I have discussed current work-up with her in detail. Discussed about getting a hepatology opinion, LFTs consistently going up->  unclear etiology. . I placed a call out to UC .       Medications:  Reviewed    Infusion Medications    dextrose 60 mL/hr at 09/17/21 0029    sodium chloride      dextrose      sodium chloride 25 mL (09/15/21 0010)    dextrose       Scheduled Medications    sodium chloride flush  10 mL IntraVENous 2 times per day    famotidine (PEPCID) injection  20 mg IntraVENous Once    pantoprazole  40 mg IntraVENous BID    tamsulosin  0.8 mg Oral Daily    polyethylene glycol  17 g Oral BID    guaiFENesin  400 mg Oral BID    docusate sodium  200 mg Oral BID    bisacodyl  10 mg Rectal Once    doxycycline (VIBRAMYCIN) IV  100 mg IntraVENous Q12H    cefTRIAXone (ROCEPHIN) IV  1,000 mg IntraVENous Q24H    sodium chloride flush  10 mL IntraVENous 2 times per day     PRN Meds: sodium chloride flush, sodium chloride, meperidine, HYDROmorphone, HYDROmorphone, morphine, morphine, glucose, dextrose, glucagon (rDNA), dextrose, sennosides-docusate sodium, ipratropium-albuterol, sodium chloride flush, sodium chloride, potassium chloride, magnesium sulfate, promethazine **OR** ondansetron, acetaminophen **OR** acetaminophen, dextrose      Intake/Output Summary (Last 24 hours) at 9/18/2021 1145  Last data filed at 9/18/2021 0644  Gross per 24 hour   Intake 979 ml   Output 250 ml   Net 729 ml       Physical Exam Performed:    /70   Pulse 63   Temp 96.7 °F (35.9 °C) (Oral)   Resp 18   Ht 5' 9\" (1.753 m)   Wt 134 lb 1.6 oz (60.8 kg)   SpO2 97%   BMI 19.80 kg/m²     General appearance:   Patient appears malnourished, very ill-appearing. Awake , alert and oriented now  appears very cachectic  Multiple areas of bruising and ecchymosis  HEENT: Pupils equal, round, and reactive to light. Conjunctivae/corneas clear. Neck: Supple, with full range of motion. No jugular venous distention. Trachea midline. Respiratory:  Normal respiratory effort. Clear to auscultation, bilaterally without Rales/Wheezes/Rhonchi. Cardiovascular: Irregularly irregular  Abdomen: Soft, non-tender, non-distended with normal bowel sounds. Musculoskeletal: No clubbing, cyanosis . Trace edema feet. Full range of motion without deformity. Skin: Chronic bilateral lower extremity wounds  Multiple areas of bruising and ecchymosis  Neurologic:  Neurovascularly intact without any focal sensory/motor deficits. Cranial nerves: II-XII intact, grossly non-focal.  Psychiatric: No anxiety or confusion today. Well-oriented    Labs:   No results for input(s): WBC, HGB, HCT, PLT in the last 72 hours.   Recent Labs     09/16/21  8109 09/17/21  0545 09/18/21  0550    133* 132*   K 3.8 3.8 3.7    99 98*   CO2 28 25 29   BUN 21* 33* 39*   CREATININE <0.5* <0.5* <0.5*   CALCIUM 8.3 8.3 8.1*     Recent Labs     09/16/21  0553 09/17/21  0545 09/18/21  0550   AST 85* 132* 188*   * 166* 160*   BILITOT 0.9 1.0 0.8   ALKPHOS 313* 369* 394*     Recent Labs     09/16/21  0553 09/17/21  0545 09/18/21  0550   INR 1.38* 1.49* 1.85*     No results for input(s): CKTOTAL, TROPONINI in the last 72 hours. Urinalysis:      Lab Results   Component Value Date    NITRU Negative 09/12/2021    WBCUA 0-2 09/12/2021    RBCUA 11-20 09/12/2021    BLOODU SMALL 09/12/2021    SPECGRAV 1.015 09/12/2021    GLUCOSEU Negative 09/12/2021       Radiology:  IR MIDLINE CATH   Final Result   Successful placement of midline. CT CHEST ABDOMEN PELVIS W CONTRAST   Final Result   Multifocal pneumonia, greatest in the left lower lobe, new compared to   09/03/2021. There is a background emphysema and severe coronary artery   disease. Filling defects are seen in the left lower lobe, suggesting   sequelae from mucous plugging or aspiration. Bilateral hydronephrosis, likely secondary to markedly distended bladder. Left-sided inguinal hernia containing colon. No resultant bowel obstruction. CT HEAD WO CONTRAST   Final Result   No acute traumatic intracranial abnormality      Atrophy and small-vessel ischemic change      Probable calcified meningioma on the right. XR CHEST PORTABLE   Final Result   Left lower lobe pneumonia         VL Extremity Venous Bilateral    (Results Pending)     Echocardiogram  Summary   Left ventricular systolic function is reduced with ejection fraction   estimated at 45-51 %. All remaining wall segments appear normal in function. Left ventricular size is decreased. There is moderate concentric left ventricular hypertrophy. Normal left ventricular diastolic filling pressure.    The right atrium is mildly dilated. The aortic root is mildly dilated. Mild mitral regurgitation. Mild-to-moderate aortic regurgitation is present. Mild to moderate tricuspid regurgitation. Normal systolic pulmonary artery pressure (SPAP) estimated at 31 mmHg (RA   pressure 8 mmHg). There is a pleural effusion. 1/15/2016 LVE, LVH, 55%, LAE, RVE, MAYELA        Assessment/Plan:    Multifocal pneumonia  Possible gram-negative infection  Patient has bronchiectasis  History of COPD  - on IV antibiotic -on Rocephin and doxycycline. Day 7/7 . Stop antibiotics today  -Continue HHN  . Oxygen saturations are stable. Obstructive uropathy  Bilateral hydronephrosis  - Valentine catheter inserted  - Urology consulted . Likely related to severe constipation. Treat as below  -PSA normal   -Continue Flomax. Keep Valentine in. And follow-up with urology as an outpatient    Constipation  - Started on on stool regimen with good response. Patient on Colace and MiraLAX, got suppositories. + BM . Stool for occult blood was negative  -Seen by GI -> S/P  EGD and colonoscopy . EGDs showed severe esophagitis and gastric ulcer-likely HSV or CMV  Colonoscopy had a transverse colon polyp and internal hemorrhoids  Continue PPI  Check HSV, EBV, CMV       Transaminitis  Worsening LFTs  -Ongoing for several months  Associated with significant weight loss. -Etiology unclear  -ALT AST and alk phos elevated. -Bilirubin level normal  -Ammonia level normal  -Hepatitis panel negative, LINDY negative  -AFP pending  - HSV, EBV, CMV pending  - CT abdomen negative. - ferritin elevated. Hemochromatosis panel pending. Supratherapeutic INR  Coagulopathy  -INR over 14  -Likely related to poor nutritional status  -Received multiple doses of oral vitamin K. INR slowly trending down. INR down from 14-> 12 --> 1.5 today. No active bleed   Monitor    A. fib   - on chronic Coumadin-hold as above. INR correcting now.   -Chart reviewed , discussed with patient again care  We discussed all current findings and work-up. No clear direction about why patient has persistent LFTs and recurrent hypoglycemia. Bilirubin level and ammonia level normal  Patient also has intermittent confusion. .      I was able to get in touch with hepatologist, Reji Lugo at [facility]. Shira Arreola He discussed the case thoroughly with me. Appreciate the phone consult. Shira Stands His current differential diagnosis  -> Possibly related to malnutrition versus liver cirrhosis. Also on the differential is possible ischemic cause although less likely. Continue to encourage nutrition. From a cirrhosis work-up standpoint-LINDY negative, hepatitis panel negative, AFP pending,. Hemochromatosis screen pending. CT abdomen essentially negative no significant liver changes noted on the CT, no splenomegaly. He agrees with work-up so far. Recommends liver biopsy with interventional radiology as the next step. I discussed his recommendation with patient and patient's daughter at bedside. We will consult interventional radiology. Likely procedure can be performed on Monday or Tuesday. Monitor INR, platelet count. Total time today 65 minutes.  > 50 % time dominated by coordination of care and D/W family      Saumya Baugh MD    9/18/2021

## 2021-09-18 NOTE — OP NOTE
[address]                 [address] [facility], [place]                                OPERATIVE REPORT    PATIENT NAME: Emma Boyd                  :        1950  MED REC NO:   [id number]                          ROOM:       0210  ACCOUNT NO:   [de-identified]                           ADMIT DATE: 2021  PROVIDER:     Tariq Brown MD    DATE OF PROCEDURE:  2021    PREOPERATIVE DIAGNOSES:  1. Failure to thrive. 2.  Severe weight loss. PROCEDURES:  1. EGD with biopsy. 2.  Colonoscopy to the cecum with snare polypectomy. POSTPROCEDURE DIAGNOSES:  1. Severe esophagitis, likely HSV. 2.  Gastric ulcer. 3.  Single transverse colon polyp. 4. Internal hemorrhoids. 5.  Otherwise normal colonoscopy to the cecum. PROCEDURE INDICATIONS:  This is a 59-year-old male with history of  hypertension, hyperlipidemia, diabetes, CHF, atrial fibrillation on  Coumadin, obstructive sleep apnea on BiPAP, admitted with pneumonia and  severe cachexia and failure to thrive resulting in severe weight loss. Workup so far with imaging of chest, abdomen and pelvis were negative  except for multifocal pneumonia. EGD and colonoscopy being performed  for diagnostic purposes. MEDICATIONS:  MAC per Anesthesia. PROCEDURE IN DETAIL:  Informed consent obtained after discussing risks,  benefits, alternatives. Full history and physical was performed. The  patient was classified as ASA class III. Medications were given by  Anesthesia. Cardiopulmonary status was continuously monitored  throughout the procedure. The patient was placed in left lateral  decubitus position. Once adequately sedated, the standard upper  gastroscope was inserted in mouth and advanced under direct  visualization to the second portion of the duodenum.   Entire mucosa of  esophagus, stomach (retroflexed and forward views), duodenum (bulb,  sweep and second portion) were examined carefully during withdrawal.    While the patient was lying in the left lateral decubitus position, the  bed was repositioned. Standard pediatric colonoscope was inserted in  the anus and advanced to cecum identified by landmarks of appendiceal  orifice and IC valve. Entire mucosa of the cecum, ascending colon,  transverse colon, descending colon, sigmoid colon, rectum were examined  carefully during withdrawal.  The patient tolerated the procedure well  without any difficulties. The colon prep was good. FINDINGS:  ESOPHAGUS:  There was severe esophagitis characterized by round lesions  concerning for HSV or CMV esophagitis. Biopsies were taken and sent for  histology as well as to rule out HSV/CMV esophagitis. STOMACH:  There was a single 7-mm clean-based ulcer in the antrum. Biopsies were taken to rule out H. Pylori. Retroflexed view of the  stomach was normal.  DUODENUM:  Examined portion of the duodenum appeared normal.  Biopsies  were taken from second portion to rule out celiac sprue. RECTUM:  The digital rectal exam was normal.  No masses were felt. COLON:  There was a single sessile 6 mm polyp in the transverse colon. This was completely resected and retrieved using snare polypectomy  method. Remaining examined colon mucosa appeared normal and healthy  without any evidence of inflammation, ulcers, pseudomembranes, masses. Retroflexed view of the rectum showed internal hemorrhoids. SUMMARY:  1. 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 2.  A gastric ulcer. 3.  A single transverse colon polyp. 4. Internal hemorrhoids. 5.  Otherwise normal EGD and colonoscopy. 6.  No explanation for weight loss on this exam.    RECOMMENDATIONS:  1. Return the patient to floor for continuous medical care. 2.  Start pantoprazole twice daily. 3.  Await pathology results. 4.  Consider checking for HIV  5. Avoid NSAIDs. 6.  Encourage nutrition with supplements.   7.  Okay to discharge from GI standpoint.   8.  Consider checking CTA to rule out mesenteric ischemia    EBL: <5mL    Julio Cueva MD    D: 09/17/2021 16:21:58       T: 09/17/2021 16:26:45     [email or web address]  Job#: [id number]     Doc#: [id number]    CC:  MD Jackeline Watson MD

## 2021-09-18 NOTE — PROGRESS NOTES
Blood sugar 67; OJ at bedside but again, patient is refusing. He is also refusing IV dextrose as well. Discussed that something will have to be done; continues to refuse. Will revisit situation shortly. Call light in reach.

## 2021-09-18 NOTE — PROGRESS NOTES
Please refer to Nitin Marina previous note to schedule liver biopsy with [place] [facility] on Monday

## 2021-09-19 NOTE — PROGRESS NOTES
Occupational Therapy Daily Treatment Note    Unit: 2 [place]  Date:  9/19/2021  Patient Name:    Trey Carrion  Admitting diagnosis:  General weakness [R53.1]  Elevated brain natriuretic peptide (BNP) level [R79.89]  Supratherapeutic INR [R79.1]  Supratherapeutic international normalized ratio (INR) [R79.1]  Pneumonia due to infectious organism, unspecified laterality, unspecified part of lung [J18.9]  Admit Date:  9/11/2021  Precautions/Restrictions:  Fall risk, Bed/chair alarm, Lines -Valentine catheter, Telemetry and Continuous pulse oximetry    Treatment Time:  6736-3998  Treatment number:  5   Total Treatment Time:   55 minutes    Patient Goals for Session:  \" to sit at the EOB \"      Discharge Recommendations: SNF  DME needs for discharge: defer to facility       Therapy recommendations for staff:  Assist of 1 to sit at EOB  Wear foot drop boots at night     History of Present Illness: per H&P on 9-11-21 [facility] FUNG   80 y. o. male history of hypertension hyperlipidemia A. fib, CHF who presents to the emergency department complaining of generalized weakness fatigue, falls. Shania Leavitt found to have hypoglycemia earlier today with a blood glucose into the 40s.  Former diabetic form of Metformin no longer on medications for this with improved A1c.  Patient has had weight loss in the last month over 15 pounds unintentionally.  Family was concerned over the last 1 month increasing fatigue and difficulty with completing ADLs.  Has had difficulty now getting out of bed in the last 24 hours requiring assistance.  Had a fall down on on the ground but did not hit head.  Is on Coumadin for history of A. fib.  No chest pain no shortness of breath no abdominal pain. Home Health S4 Level Recommendation:  NA    AM-PAC Score: AM-PAC Inpatient Daily Activity Raw Score: 14  Pt scored a 14/24 on the AM-PAC ADL Inpatient form.  Current research shows that an AM-PAC score of 17 or less is typically not associated with a discharge to the patient's home setting. Subjective:  Pt supine in bed upon therapist arrival. Pt agreeable to work with therapy this date. Pain   No  Rating: NA  Location:None reported throughout session   Pain Medicine Status: No request made      Cognition:    A&O Person, Place, Time and Situation   Able to follow 2 step commands, consistently. Balance:  Functional Sitting Balance:  WFL   Comments: Good at EOB  Functional Standing Balance:NT   Comments: NT    Bed mobility:    Supine to sit:   moderate assistance (50%)  Sit to supine:   maximal assistance (75%) and 2 people  Rolling:    Not Tested  Scooting in sitting:  maximal assistance (75%)  Scooting to head of bed:   total assistance (100%) and 2 people   Bridging:   No    Transfers:    Sit to stand:  Not Tested  Stand to sit:  Not Tested  Bed to chair:   Not Tested  Standard toilet: Not Tested  Bed to [facility]:  Not Tested    Activities of Daily Living:   UB Dressing:   Not Tested  LB Dressing:    Not Tested  UB Bathing:  Not Tested  LB Bathing:  Not Tested  Feeding:  Not Tested  Grooming:   SBA to complete oral care seated at EOB   Toileting:  Not Tested    Activity Tolerance:   Pt completed therapy session with No adverse symptoms noted w/activity  HR up to 145bpm with activity. RN aware and watching monitor. Therapeutic Exercise: The following exercises completed 5-10x each seated at EOB:   Knee flex/ext, lateral weight sifts, pelvic tilts to sit fully upright, ankle pumps, marching (had to use BUE to lift legs)     Patient Education:   Role of OT    Positioning Needs: In bed, call light and needs in reach. Alarm Set    Family Present:  Wife present initially, however, left during session     Assessment: Pt with good progress, able tolerate increased activity at EOB. Pt sat at EOB for 35 minutes. Pt may benefit from continued skilled occupational therapy while in the hospital in order to progress to a safe and more indpt level of functioning. GOALS  To be met in 3 Visits:  1). Bed to toilet/BSC: Mod A  and 2 persons with RW     To be met in 5 Visits:  1). Supine to/from QDC:             NHW A   2). Upper Body Bathing:         CGA  3). Lower Body Bathing:         Mod A   4). Upper Body Dressing:       Min A   5). Lower Body Dressing:       Mod A  and Max A   6).  Pt to demonstrate UE exs x 15 reps with minimal cues      Plan: cont with POC      DANN Hernandez, OTR/L   [id number]       If patient discharges from this facility prior to next visit, this note will serve as the Discharge Summary

## 2021-09-19 NOTE — CARE COORDINATION
Spoke with pt and family at bedside on 9/18 as pt was to dc to [facility] for rehab and pt has now decided will not go to rehab and will stay and complete testing before leaving. Unsure of dcp at this time.  Probably will go home

## 2021-09-19 NOTE — FLOWSHEET NOTE
09/19/21 0702   Vital Signs   Temp 98.6 °F (37 °C)   Temp Source Oral   Pulse 89   Heart Rate Source Monitor   Resp 20   /69   BP Location Right upper arm   Patient Position Semi fowlers   Level of Consciousness Alert (0)   MEWS Score 1   Patient Currently in Pain Denies   Pain Assessment   RASS Score 0   POSS Score (Patient Ctrl Analgesia) Awake and Alert   Oxygen Therapy   SpO2 94 %   O2 Device None (Room air)     Alert and oriented x4 today. Skin w/d. resp e/e unlabored no cough noted. Repositioned patient in bed. Breakfast set up and meds given. Nursing asmt completed. abd soft and non distended. Preventative dressing to bony prominence to spine. Sacrum dressing to stage 1. Dressing to RLE c/d/i. Denies pain. No voiced concerns. Liver biopsy needs called into [place] [facility]ology and scheduled for Monday. No s/s distress noted. Call light in easy reach.

## 2021-09-19 NOTE — PROGRESS NOTES
Dressing to RLE changed as ordered. Tolerated well. New fluid filled blister to right inner calf. mepilex applied for protection.

## 2021-09-19 NOTE — PLAN OF CARE
Problem: Skin Integrity:  Goal: Will show no infection signs and symptoms  Description: Will show no infection signs and symptoms  Outcome: Ongoing  Goal: Absence of new skin breakdown  Description: Absence of new skin breakdown  Outcome: Ongoing     Problem: Falls - Risk of:  Goal: Will remain free from falls  Description: Will remain free from falls  Outcome: Ongoing  Goal: Absence of physical injury  Description: Absence of physical injury  Outcome: Ongoing     Problem: Bleeding:  Goal: Will show no signs and symptoms of excessive bleeding  Description: Will show no signs and symptoms of excessive bleeding  Outcome: Ongoing     Problem: Nutrition  Goal: Optimal nutrition therapy  Outcome: Ongoing  Goal: Understanding of nutritional guidelines  Outcome: Ongoing

## 2021-09-19 NOTE — PROGRESS NOTES
Shift assessment complete - See flow sheet. Nightly medications given - See [facility]         Patient with no complaints of pain at this time. Patient denies any further needs. Bed alarm is set for patient safety. Call light explained and in reach      Pt bladder scanned at this time, as there has been little U/O since removal of aguayo, there was 81ml found. Will continue to monitor.  Cinthya Ferrara, RN, RN

## 2021-09-19 NOTE — PROGRESS NOTES
Pt resting with eyes closed, respirs witnessed as e/e, no signs of distress. SR up x2, bed in lowest  position, wheels locked,bed alarm on, Call light and bedside table in easy reach.    Heidi Almendarez, RN, RN

## 2021-09-19 NOTE — PROGRESS NOTES
Pt resting with eyes closed, respirs witnessed as e/e, no signs of distress. SR up x2, bed in lowest  position, wheels locked,bed alarm on, Call light and bedside table in easy reach.    Hilda Bermudez, RN, RN

## 2021-09-19 NOTE — PROGRESS NOTES
Bedside Mobility Assessment Tool (BMAT):     Assessment Level 1- Sit and Shake    1. From a semi-reclined position, ask patient to sit up and rotate to a seated position at the side of the bed. Can use the bedrail. 2. Ask patient to reach out and grab your hand and shake making sure patient reaches across his/her midline. Pass- Patient is able to come to a seated position, maintain core strength. Maintains seated balance while reaching across midline. Move on to Assessment Level 2. Assessment Level 2- Stretch and Point   1. With patient in seated position at the side of the bed, have patient place both feet on the floor (or stool) with knees no higher than hips. 2. Ask patient to stretch one leg and straighten the knee, then bend the ankle/flex and point the toes. If appropriate, repeat with the other leg. Fail- Patient is unable to complete task. Patient is MOBILITY LEVEL 2. Assessment Level 3- Stand   1. Ask patient to elevate off the bed or chair (seated to standing) using an assistive device (cane, bedrail). 2. Patient should be able to raise buttocks off be and hold for a count of five. May repeat once. Fail- Patient unable to demonstrate standing stability. Patient is MOBILITY LEVEL 3. Assessment Level 4- Walk   1. Ask patient to march in place at bedside. 2. Then ask patient to advance step and return each foot. Some medical conditions may render a patient from stepping backwards, use your best clinical judgement. Fail- Patient not able to complete tasks OR requires use of assistive device. Patient is MOBILITY LEVEL 3.        Mobility Level- 1     Patient states to weak today

## 2021-09-19 NOTE — PROGRESS NOTES
Hospitalist Progress Note      PCP: Nils Hickman MD    Date of Admission: 9/11/2021    Hospital Course:     69-year-old gentleman with history of atrial fibrillation, chronic congestive heart failure, diabetes type 2, history of smoking and remote history of alcohol use  Presenting with profound weakness, progressively getting worse associated with weight loss, progressive worsening LFTs over the last several of months. Now with constipation , bilateral hydronephrosis and  pneumonia. Patient was severely coagulopathic with INR over 14. Persistent recurrent episodes of hypoglycemia  Concern for possible underlying malignancy, however so far work-up has not been revealing. Patient has been seen by GI,  oncology and urology    S/P EGD, colonoscopy -  showed severe esophagitis and gastric ulcer-likely HSV or CMV  Colonoscopy had a transverse colon polyp and internal hemorrhoids    His LFTs are persistently elevated  . Patient is profoundly weak- seen by PT, OT . Needs SNF  Oral intake is improving now. He has been having significant episodes of confusion, and hypoglycemic episodes at night. Seems to be a little better now. On IV fluids with D5 for hypoglycemia    Subjective:    9/19  Patient is doing well today awake alert and well-oriented . Oral intake has improved  Appears to have had less confusion through the night. LFTs still significantly trending up. Plan is for liver biopsy. Plan of care discussed with , patient's wife and patient's daughter at bedside. Patient's wife is a nurse here at [facility]. His dtr is a pediatrician . I have discussed current work-up/plan of care with them in detail.         Medications:  Reviewed    Infusion Medications    dextrose 50 mL/hr at 09/19/21 1230    sodium chloride      dextrose      sodium chloride 25 mL (09/15/21 0010)    dextrose       Scheduled Medications    pantoprazole  40 mg Oral BID AC    sodium chloride flush  10 mL IntraVENous 2 times per day    tamsulosin  0.8 mg Oral Daily    polyethylene glycol  17 g Oral BID    guaiFENesin  400 mg Oral BID    docusate sodium  200 mg Oral BID    sodium chloride flush  10 mL IntraVENous 2 times per day     PRN Meds: sodium chloride flush, sodium chloride, glucose, dextrose, glucagon (rDNA), dextrose, sennosides-docusate sodium, ipratropium-albuterol, sodium chloride flush, sodium chloride, potassium chloride, magnesium sulfate, promethazine **OR** ondansetron, dextrose      Intake/Output Summary (Last 24 hours) at 9/19/2021 1252  Last data filed at 9/19/2021 1011  Gross per 24 hour   Intake 3116.14 ml   Output --   Net 3116.14 ml       Physical Exam Performed:    BP 90/62   Pulse 67   Temp 96.9 °F (36.1 °C) (Oral)   Resp 18   Ht 5' 9\" (1.753 m)   Wt 134 lb 1.6 oz (60.8 kg)   SpO2 94%   BMI 19.80 kg/m²     General appearance:   Patient appears malnourished,  ill-appearing. Awake , alert and well oriented now  appearscachectic  Multiple areas of bruising and ecchymosis  HEENT: Pupils equal, round, and reactive to light. Conjunctivae/corneas clear. Neck: Supple, with full range of motion. No jugular venous distention. Trachea midline. Respiratory:  Normal respiratory effort. Clear to auscultation, bilaterally without Rales/Wheezes/Rhonchi. Cardiovascular: Irregularly irregular  Abdomen: Soft, non-tender, non-distended with normal bowel sounds. Musculoskeletal: No clubbing, cyanosis . Trace edema feet. Full range of motion without deformity. Skin: Chronic bilateral lower extremity wounds  Multiple areas of bruising and ecchymosis  Neurologic:  Neurovascularly intact without any focal sensory/motor deficits. Cranial nerves: II-XII intact, grossly non-focal.  Psychiatric: No anxiety or confusion today.   Well-oriented    Labs:   Recent Labs     09/19/21  1025   WBC 7.6   HGB 10.7*   HCT 31.4*        Recent Labs     09/17/21  0545 09/18/21  0550 09/19/21  0556   * 132* 130*   K 3.8 3.7 3.5   CL 99 98* 95*   CO2 25 29 28   BUN 33* 39* 40*   CREATININE <0.5* <0.5* 0.6*   CALCIUM 8.3 8.1* 8.4     Recent Labs     09/17/21  0545 09/18/21  0550 09/19/21  0556   * 188* 301*   * 160* 246*   BILITOT 1.0 0.8 0.6   ALKPHOS 369* 394* 477*     Recent Labs     09/17/21  0545 09/18/21  0550 09/19/21  0556   INR 1.49* 1.85* 1.94*     No results for input(s): CKTOTAL, TROPONINI in the last 72 hours. Urinalysis:      Lab Results   Component Value Date    NITRU Negative 09/18/2021    WBCUA 0-2 09/12/2021    RBCUA 11-20 09/12/2021    BLOODU Negative 09/18/2021    SPECGRAV 1.025 09/18/2021    GLUCOSEU Negative 09/18/2021       Radiology:  IR MIDLINE CATH   Final Result   Successful placement of midline. CT CHEST ABDOMEN PELVIS W CONTRAST   Final Result   Multifocal pneumonia, greatest in the left lower lobe, new compared to   09/03/2021. There is a background emphysema and severe coronary artery   disease. Filling defects are seen in the left lower lobe, suggesting   sequelae from mucous plugging or aspiration. Bilateral hydronephrosis, likely secondary to markedly distended bladder. Left-sided inguinal hernia containing colon. No resultant bowel obstruction. CT HEAD WO CONTRAST   Final Result   No acute traumatic intracranial abnormality      Atrophy and small-vessel ischemic change      Probable calcified meningioma on the right. XR CHEST PORTABLE   Final Result   Left lower lobe pneumonia         VL Extremity Venous Bilateral    (Results Pending)   IR BIOPSY LIVER PERCUTANEOUS    (Results Pending)     Echocardiogram  Summary   Left ventricular systolic function is reduced with ejection fraction   estimated at 45-51 %. All remaining wall segments appear normal in function. Left ventricular size is decreased. There is moderate concentric left ventricular hypertrophy. Normal left ventricular diastolic filling pressure.    The right atrium is mildly dilated. The aortic root is mildly dilated. Mild mitral regurgitation. Mild-to-moderate aortic regurgitation is present. Mild to moderate tricuspid regurgitation. Normal systolic pulmonary artery pressure (SPAP) estimated at 31 mmHg (RA   pressure 8 mmHg). There is a pleural effusion. 1/15/2016 LVE, LVH, 55%, LAE, RVE, MAYELA        Assessment/Plan:    Multifocal pneumonia  Possible gram-negative infection  Patient has bronchiectasis  History of COPD  - on IV antibiotic -on Rocephin and doxycycline. Day 7/7 . Stop antibiotics today  -Continue HHN  . Oxygen saturations are stable. Obstructive uropathy  Bilateral hydronephrosis  - Valentine catheter inserted  - Urology consulted . Likely related to severe constipation. Treat as below  -PSA normal   -Continue Flomax. Keep Valentine in. And follow-up with urology as an outpatient    Constipation  - Started on on stool regimen with good response. Patient on Colace and MiraLAX, got suppositories. + BM . Stool for occult blood was negative  -Seen by GI -> S/P  EGD and colonoscopy . EGDs showed severe esophagitis and gastric ulcer-likely HSV or CMV  Colonoscopy had a transverse colon polyp and internal hemorrhoids  Continue PPI  Check HSV, EBV, CMV       Transaminitis  Worsening LFTs  -Ongoing for several months  Associated with significant weight loss. -Etiology unclear  -ALT AST and alk phos elevated. -Bilirubin level normal  -Ammonia level normal  -Hepatitis panel negative, LINDY negative  -AFP pending  - HSV, EBV, CMV pending  -Antimitochondrial antibody pending  - CT abdomen negative. - ferritin elevated. Hemochromatosis panel pending. Supratherapeutic INR  Coagulopathy  -INR over 14  -Likely related to poor nutritional status  -Received multiple doses of oral vitamin K. INR slowly trending down. INR down from 14-> 12 --> 1.9 today. No active bleed   Monitor  Will be dosed with oral vitamin K today in anticipation of liver biopsy    A. medications reviewed with the patient and family. patient has not been on Lipitor or verapamil for a while. his Coumadin dose was 10 mg and 5 mg alternating prior to admission. patient was otherwise taking only Lasix, Colace and Flomax. Not on any antihypertensive medications      Updated patient and his dtr at bedside with plan of care  We discussed all current findings and work-up. No clear direction about why patient has persistent LFTs and recurrent hypoglycemia. Bilirubin level and ammonia level normal  Patient also has intermittent confusion. .      On 9/18   I was able to get in touch with hepatologist, Grant Bingham at [facility]. Jerome Hernandez He discussed the case thoroughly with me. Appreciate the phone consult. Jerome Hernandez His current differential diagnosis  -> Possibly related to malnutrition versus liver cirrhosis. Also on the differential is possible ischemic cause although less likely. Continue to encourage nutrition. From a cirrhosis work-up standpoint-LINDY negative, hepatitis panel negative, AFP pending,. Hemochromatosis screen pending. CT abdomen essentially negative no significant liver changes noted on the CT, no splenomegaly. He agrees with work-up so far. Recommends liver biopsy with interventional radiology as the next step. I discussed his recommendation with patient and patient's daughter at bedside. We will consult interventional radiology. Likely procedure can be performed on Monday or Tuesday. Monitor INR, platelet count.            Chico Shrestha MD    9/19/2021

## 2021-09-19 NOTE — PROGRESS NOTES
Patient still eating breakfast.  Cleaned up linens and took the trash out of bathroom. Patient states will allow dressing change later to RLE.

## 2021-09-19 NOTE — FLOWSHEET NOTE
09/19/21 0702   Vital Signs   Temp 98.6 °F (37 °C)   Temp Source Oral   Pulse 89   Heart Rate Source Monitor   Resp 20   /69   BP Location Right upper arm   Patient Position Semi fowlers   Level of Consciousness Alert (0)   MEWS Score 1   Patient Currently in Pain Denies   Pain Assessment   RASS Score 0   POSS Score (Patient Ctrl Analgesia) Awake and Alert   Oxygen Therapy   SpO2 94 %   O2 Device None (Room air)

## 2021-09-20 NOTE — PROGRESS NOTES
[facility] called this morning and a message was left with Seun Orta who stated that she would send a message to the  and they will call us with a time.

## 2021-09-20 NOTE — PROGRESS NOTES
Inpatient Physical Therapy Daily Treatment Note    Unit: 2 [facility]  Date:  9/20/2021  Patient Name:    Adeel Palacios  Admitting diagnosis:  General weakness [R53.1]  Elevated brain natriuretic peptide (BNP) level [R79.89]  Supratherapeutic INR [R79.1]  Supratherapeutic international normalized ratio (INR) [R79.1]  Pneumonia due to infectious organism, unspecified laterality, unspecified part of lung [J18.9]  Admit Date:  9/11/2021  Precautions/Restrictions/WB Status/ Lines/ Wounds/ Oxygen: Fall risk, Bed/chair alarm, Lines -Valentine catheter, Telemetry and Continuous pulse oximetry    Treatment Time: 9233-7627  Treatment Number:  4  Timed Code Treatment Minutes: 25 minutes  Total Treatment Minutes:  25 minutes    Patient Goals for Therapy: none stated today          Discharge Recommendations: SNF  DME needs for discharge: defer to facility       Therapy recommendation for EMS Transport: requires transport by cot due to need for lift equipment to safely transfer    Therapy recommendations for staff:   Assist of 2 for bed mobility and sitting EOB. History of Present Illness: Per H&P Dr. Ayush Ayoub 9/11: \"79 y.o. male who presented to [facility] with past medical history of A. fib on Coumadin, HFpEF, type 2 diabetes, hypertension, hyperlipidemia, BELKYS on BiPAP presented to the ED for worsening fatigue unintentional weight loss and inability ambulate.     Patient reported he has lost almost 10 to 20 pounds unintentionally from previous. Patient in the picture in the chart looks much more skinnier malnourished anorexic. Patient reported that he has been having appropriate appetite eating and drinking and has been having increased cough no phlegm but does report shortness of breath on exertion. Alleviated with rest.  Patient reports that he was seen his primary care and was reported that he needs to be follow-up with GI and heme-onc.   Patient's daughter is a pediatrician physician and I also spoke on the phone to discuss care. Patient does not currently smoke drink however does have extensive 20+ pack years of smoking in addition to drinking. Patient also more than 20 years from the time where he needs a colonoscopy. Patient also reports been having intermittent diarrhea and constipation no known alleviating exacerbating factor as well. No reported bleeding. Patient also reports that he has lower extremity edema that is progressive despite putting Ace wrap in addition has a lesion on the right foot which the patient does not take care of nor follow-up. CODE STATUS discussed and the patient is a full code. \"    Home Health S4 Level Recommendation:  NA  AM-PAC Mobility Score    AM-PAC Inpatient Mobility Raw Score : 10  AM-PAC Inpatient Mobility without Stair Climbing Raw Score : 9    Pain   Yes  Location: \"all over\"  Rating: unrated /10 - appears mild. Sensitivity to IVs and bandages in RUE. Pain Medicine Status: No request made    Cognition    A&O orientation not directly assessed. Able to follow 2 step commands    Subjective  Patient lying supine in bed with spouse present, assisting PCA with brief change. PCA reports earlier today pt wanted to sit up to EOB and attempted to do so with PCA but they were unable to complete the transfer. At time of PT session, pt states he does not want to do therapy until after his planned liver biopsy procedure this afternoon. With light encouragement from therapists and pt's wife, pt is agreeable to bed level exercise. Balance  Sitting:  Not tested; N/A   Comments: N/A    Standing: Not tested; Not Tested  Comments:  N/A    Bed Mobility   Supine to Sit:    Not Tested  Sit to Supine:   Not Tested   Rolling:   Not Tested  Scooting in sitting: Not Tested   Scooting in supine:  Not Tested     Transfer Training     Sit to stand:   Not Tested   Stand to sit:   Not Tested   Bed to Chair:  Not Tested with use of N/A. Gait gait deferred due to unsafe to attempt; pt ambulated 0 ft. Distance:      NA ft  Deviations (firm surface/linoleum):  N/A  Assistive Device Used:    N/A  Level of Assist:    N/A  Comment: NA    Stair Training deferred, pt unsafe/ not appropriate to complete stairs at this time    Activity Tolerance   Pt completed therapy session with Pain noted with some RUE AROM/PROM. Positioning Needs   Pt in bed, no alarm needed, positioned in proper neutral alignment and pressure relief provided. Spouse in room. Exercises Initiated  all completed bilaterally unless indicated and completed in supine position  Ankle Pumps x 10 reps  Quad sets x 10 reps  Hip abduction: x 10 reps with slide sheet, AAROM  Ankle PF with manual resistance x 10 reps RLE  Hip IR/ER x 10 reps; with LLE, cues for sustained IR to neutral. Pt able to hold isometric contraction for <5 seconds. Alternating UE and LE exercises with OT direction for UE, due to pt's low activity tolerance. Other  Additional/new skin tear sites and bruises noted on bilat LEs. Caution with bed mobility required. Patient/Family Education   Pt educated on role of inpatient PT, POC, importance of continued activity, transfer techniques. Assessment  Pt seen for Physical Therapy follow up treatment. This session limited to supine A/AA/PROM of UE and LEs as tolerated. Pt refused attempt to sit up to EOB but does say he \"did well\" with therapy yesterday sitting EOB. Will downgrade goals today based on current status. Recommending SNF upon discharge as patient functioning well below baseline, demonstrates good rehab potential and unable to return home due to inability to negotiate stairs to enter home/bedroom/bathroom, burden of care beyond caregiver ability and home environment not conducive to patient recovery. Goals : To be met in 3 visits:  1). Independent with LE Ex x 10 reps - 9/20 pt and pt's wife have been educated on supine there ex and can demo indep. Slide sheet left in room.   2) Goal added 9/20: Tolerate sitting EOB 30 minutes with Supervision. To be met in 6 visits:  1). Supine to/from sit:  goal amended 9/20: Min A  2). Sit to/from stand:  goal amended 9/20: Min A  3). Bed to chair:  goal amended 9/20: Min A  4). Gait: Establish goal after trial.  5). Tolerate B LE exercises 3 sets of 10-15 reps    Rehabilitation Potential: Good  Strengths for achieving goals include:   Pt motivated, PLOF, Family Support and Pt cooperative   Barriers to achieving goals include:    Complexity of condition    Plan    To be seen 3-5 x / week  while in acute care setting for therapeutic exercises, bed mobility, transfers, progressive gait training, balance training, and family/patient education. Signature: Karin Balderas PT, DPT    If patient discharges from this facility prior to next visit, this note will serve as the Discharge Summary.

## 2021-09-20 NOTE — FLOWSHEET NOTE
conversed with pt alone before wife came into room and joined conversation. Pt reported that he had \"caused a mess\" last night, expressing awareness that he had acted out in strong way. Pt not sure what happened to him, but detailed description of what he remembered doing. Pt apologetic, but now calm and welcomed conversation. Nurse came to attend to pt and  had side conversation with pt's wife, Kimberley. She is a part-time surg nurse here at [employer], anticipating prison in 1.5 yr. Dght is pediatrician who lives in [place]. Son works in some PT related area. All of family actively engaged in processing pt's situation. Pt's wife is \"overwhelmed\" by the accumulative challenges beng faced.  encouraged pt and wife to lean on CM for support, and also spoke to pt's CM after pt visit. Asking pt \"what he wants,\" he says (pointing to wife), Channing Dunham decides. \" Later adding, \"and my kids. \" Pt still seems prone to some agitation. Pt anticipating afternoon procedure and  will follow-up later today. 09/20/21 1107   Encounter Summary   Services provided to: Patient and family together   Referral/Consult From: Other    Support System Spouse; Children   Place of [employer] (Past[facility] [employer]   Continue Visiting   (9/20 - Spiritual support, listening)   Complexity of Encounter Moderate   Length of Encounter 45 minutes   Spiritual Assessment Completed Yes   Spiritual/Baptist   Type Spiritual support   Assessment Approachable; Anxious; Angry   Intervention Active listening;Explored feelings, thoughts, concerns   Outcome Expressed gratitude;Engaged in conversation; Shared life review;Expressed feelings/needs/concerns;Receptive;Venting emotion

## 2021-09-20 NOTE — PLAN OF CARE
Problem: Skin Integrity:  Goal: Will show no infection signs and symptoms  Description: Will show no infection signs and symptoms  Outcome: Ongoing  Goal: Absence of new skin breakdown  Description: Absence of new skin breakdown  Outcome: Ongoing     Problem: Falls - Risk of:  Goal: Will remain free from falls  Description: Will remain free from falls  Outcome: Ongoing  Goal: Absence of physical injury  Description: Absence of physical injury  Outcome: Ongoing     Problem: Bleeding:  Goal: Will show no signs and symptoms of excessive bleeding  Description: Will show no signs and symptoms of excessive bleeding  Outcome: Ongoing     Problem: Nutrition  Goal: Optimal nutrition therapy  Outcome: Ongoing  Goal: Understanding of nutritional guidelines  Outcome: Ongoing     Problem: Pain:  Goal: Pain level will decrease  Description: Pain level will decrease  Outcome: Ongoing  Goal: Control of acute pain  Description: Control of acute pain  Outcome: Ongoing  Goal: Control of chronic pain  Description: Control of chronic pain  Outcome: Ongoing

## 2021-09-20 NOTE — PROGRESS NOTES
PROGRESS NOTE  S:71 yrs Patient  admitted on 9/11/2021 with General weakness [R53.1]  Elevated brain natriuretic peptide (BNP) level [R79.89]  Supratherapeutic INR [R79.1]  Supratherapeutic international normalized ratio (INR) [R79.1]  Pneumonia due to infectious organism, unspecified laterality, unspecified part of lung [J18.9] . Planning liver biopsy    Current Hospital Schedued Meds   pantoprazole  40 mg Oral BID AC    sodium chloride flush  10 mL IntraVENous 2 times per day    tamsulosin  0.8 mg Oral Daily    polyethylene glycol  17 g Oral BID    guaiFENesin  400 mg Oral BID    docusate sodium  200 mg Oral BID    sodium chloride flush  10 mL IntraVENous 2 times per day     Current Hospital IV Meds   dextrose 50 mL/hr at 09/19/21 1546    sodium chloride      dextrose      sodium chloride 25 mL (09/15/21 0010)    dextrose       Current Hospital PRN Meds  sodium chloride flush, sodium chloride, glucose, dextrose, glucagon (rDNA), dextrose, sennosides-docusate sodium, ipratropium-albuterol, sodium chloride flush, sodium chloride, potassium chloride, magnesium sulfate, promethazine **OR** ondansetron, dextrose    Exam:   Vitals:    09/19/21 1243   BP: 90/62   Pulse: 67   Resp: 18   Temp: 96.9 °F (36.1 °C)   SpO2: 94%     I/O last 3 completed shifts: In: 3116.1 [P.O.:240;  I.V.:1572.8; IV Piggyback:1303.4]  Out: -    General appearance: alert, appears stated age and cooperative  HEENT: PERRLA  Neck: no adenopathy, no carotid bruit, no JVD, supple, symmetrical, trachea midline and thyroid not enlarged, symmetric, no tenderness/mass/nodules  Lungs: clear to auscultation bilaterally  Heart: regular rate and rhythm, S1, S2 normal, no murmur, click, rub or gallop  Abdomen: soft, non-tender; bowel sounds normal; no masses,  no organomegaly  Extremities: extremities normal, atraumatic, no cyanosis or edema     Labs:  CBC:   Recent Labs     09/19/21  1025   WBC 7.6   HGB 10.7*   HCT 31.4*   MCV 95.5        BMP:   Recent Labs     09/17/21  0545 09/18/21  0550 09/19/21  0556   * 132* 130*   K 3.8 3.7 3.5   CL 99 98* 95*   CO2 25 29 28   BUN 33* 39* 40*   CREATININE <0.5* <0.5* 0.6*     LIVER PROFILE:   Recent Labs     09/17/21  0545 09/18/21  0550 09/19/21  0556   * 188* 301*   * 160* 246*   PROT 5.1* 4.7* 4.8*   BILITOT 1.0 0.8 0.6   ALKPHOS 369* 394* 477*     PT/INR:   Recent Labs     09/17/21  0545 09/18/21  0550 09/19/21  0556   INR 1.49* 1.85* 1.94*       IMAGING:  No results found. Hospital Problems         Last Modified POA    Supratherapeutic INR 9/18/2021 Yes    General weakness 9/18/2021 Yes    Supratherapeutic international normalized ratio (INR) 9/18/2021 Yes    Pneumonia due to infectious organism 9/18/2021 Yes    Constipation 9/18/2021 Yes    Weight loss 9/18/2021 Yes         Impression:  81 yo male with afib on coumadin, diabetes, HTN, hyperlipidemia, BELKYS with unintentional weight loss, transaminitis. Coagulopathic on admission    Recommendation:  1.  Planning liver biopsy, consider transjugular biopsy with pressures given right atrial/ventricular dilation      Author Luis Daniel DO  9:13 PM 9/19/2021            [address] [place] [address]    [address]      [address] [place] [address]     Phone: [phone]     Fax: [phone]

## 2021-09-20 NOTE — PROGRESS NOTES
Pt is refusing vital signs at this time and pulling the hose out of his bi-pap, saying he is dying and to let him be. Pt is confused and agitated speaking unkindly to staff. Respiratory has been called to add oxygen to the bipap as O2 sats are 86% with bi-pap on.

## 2021-09-20 NOTE — PLAN OF CARE
Problem: Skin Integrity:  Goal: Will show no infection signs and symptoms  Description: Will show no infection signs and symptoms  9/19/2021 2225 by Gracie Portillo RN  Outcome: Ongoing  9/19/2021 0903 by Inderjit Green RN  Outcome: Ongoing  Goal: Absence of new skin breakdown  Description: Absence of new skin breakdown  9/19/2021 0903 by Inderjit Green RN  Outcome: Ongoing     Problem: Falls - Risk of:  Goal: Will remain free from falls  Description: Will remain free from falls  9/19/2021 2225 by Gracie Portillo RN  Outcome: Met This Shift  9/19/2021 0903 by Inderjit Green RN  Outcome: Ongoing  Goal: Absence of physical injury  Description: Absence of physical injury  9/19/2021 0903 by Inderjit Green RN  Outcome: Ongoing     Problem: Bleeding:  Goal: Will show no signs and symptoms of excessive bleeding  Description: Will show no signs and symptoms of excessive bleeding  9/19/2021 0903 by Inderjit Green RN  Outcome: Ongoing     Problem: Nutrition  Goal: Optimal nutrition therapy  9/19/2021 2225 by Gracie Portillo RN  Outcome: Ongoing  9/19/2021 0903 by Inderjit Green RN  Outcome: Ongoing  Goal: Understanding of nutritional guidelines  9/19/2021 0903 by Inderjit Green RN  Outcome: Ongoing

## 2021-09-20 NOTE — PROGRESS NOTES
Upon waking pt this morning he became angry, throwing his arms, threw himself across the bed, and is refusing care including a scheduled venous dopler. Pt is using profanities to refer to staff. Pt has reopened 2 skin tears on his right arm. Writer bandaged both areas. Edit to add: wife contacted and updated, wife will be coming in today, will re-attempt care with wife present in hopes that pt will have a better response with her present.

## 2021-09-20 NOTE — PROGRESS NOTES
Bedside Mobility Assessment Tool (BMAT):     Assessment Level 1- Sit and Shake    1. From a semi-reclined position, ask patient to sit up and rotate to a seated position at the side of the bed. Can use the bedrail. 2. Ask patient to reach out and grab your hand and shake making sure patient reaches across his/her midline. Fail- Patient is unable to perform tasks, patient is MOBILITY LEVEL 1. Assessment Level 2- Stretch and Point   1. With patient in seated position at the side of the bed, have patient place both feet on the floor (or stool) with knees no higher than hips. 2. Ask patient to stretch one leg and straighten the knee, then bend the ankle/flex and point the toes. If appropriate, repeat with the other leg. Fail- Patient is unable to complete task. Patient is MOBILITY LEVEL 2. Assessment Level 3- Stand   1. Ask patient to elevate off the bed or chair (seated to standing) using an assistive device (cane, bedrail). 2. Patient should be able to raise buttocks off be and hold for a count of five. May repeat once. Fail- Patient unable to demonstrate standing stability. Patient is MOBILITY LEVEL 3. Assessment Level 4- Walk   1. Ask patient to march in place at bedside. 2. Then ask patient to advance step and return each foot. Some medical conditions may render a patient from stepping backwards, use your best clinical judgement. Fail- Patient not able to complete tasks OR requires use of assistive device. Patient is MOBILITY LEVEL 3. Mobility Level- 1     Patient is not able to demonstrate the ability to move from a reclining position to an upright position within the recliner due to weakness and impaired mobility.

## 2021-09-20 NOTE — PROGRESS NOTES
PROGRESS NOTE  S:71 yrs Patient  admitted on 9/11/2021 with General weakness [R53.1]  Elevated brain natriuretic peptide (BNP) level [R79.89]  Supratherapeutic INR [R79.1]  Supratherapeutic international normalized ratio (INR) [R79.1]  Pneumonia due to infectious organism, unspecified laterality, unspecified part of lung [J18.9] . Today he feels well. He passed 3-4x BMs yesterday. Exam:   Vitals:    09/20/21 0600   BP:    Pulse:    Resp:    Temp:    SpO2: 94%      General appearance: alert, appears older than stated age, cooperative, no distress and syndromic appearance - chronically ill appearing  HEENT: Neck supple with midline trachea  Neck: no adenopathy and supple, symmetrical, trachea midline  Lungs: clear to auscultation bilaterally  Heart: regular rate and rhythm, S1, S2 normal, no murmur, click, rub or gallop  Abdomen: soft, non-tender; bowel sounds normal; no masses,  no organomegaly  Extremities: extremities normal, atraumatic, no cyanosis or edema     Medications: Reviewed    Labs:  CBC:   Recent Labs     09/19/21  1025   WBC 7.6   HGB 10.7*   HCT 31.4*   MCV 95.5        BMP:   Recent Labs     09/18/21  0550 09/19/21  0556 09/20/21  0504   * 130* 130*   K 3.7 3.5 3.3*   CL 98* 95* 94*   CO2 29 28 25   BUN 39* 40* 38*   CREATININE <0.5* 0.6* 0.6*     LIVER PROFILE:   Recent Labs     09/18/21  0550 09/19/21  0556 09/20/21  0504   * 301* 149*   * 246* 195*   PROT 4.7* 4.8* 5.0*   BILITOT 0.8 0.6 0.8   ALKPHOS 394* 477* 433*     PT/INR:   Recent Labs     09/18/21  0550 09/19/21  0556 09/20/21  0504   INR 1.85* 1.94* 1.50*       IMAGING:  IR MIDLINE CATH   Final Result   Successful placement of midline. CT CHEST ABDOMEN PELVIS W CONTRAST   Final Result   Multifocal pneumonia, greatest in the left lower lobe, new compared to   09/03/2021. There is a background emphysema and severe coronary artery   disease.   Filling defects are seen in the left lower lobe, suggesting   sequelae from mucous plugging or aspiration. Bilateral hydronephrosis, likely secondary to markedly distended bladder. Left-sided inguinal hernia containing colon. No resultant bowel obstruction. CT HEAD WO CONTRAST   Final Result   No acute traumatic intracranial abnormality      Atrophy and small-vessel ischemic change      Probable calcified meningioma on the right. XR CHEST PORTABLE   Final Result   Left lower lobe pneumonia         VL Extremity Venous Bilateral    (Results Pending)   IR BIOPSY LIVER PERCUTANEOUS    (Results Pending)       PROCEDURES:  1. EGD with biopsy. 2.  Colonoscopy to the cecum with snare polypectomy. DATE OF PROCEDURE:  09/17/2021  PRE-OPERATIVE DIAGNOSES:  1. Failure to thrive. 2.  Severe weight loss. POST-PROCEDURE DIAGNOSES:  1. Severe esophagitis, likely HSV. 2.  Gastric ulcer. 3.  Single transverse colon polyp. 4. Internal hemorrhoids. 5.  Otherwise normal colonoscopy to the cecum. Attending Supervising [de-identified] Attestation Statement  The patient is a 80 y.o. male. I have performed a history and physical examination of the patient. I discussed the case with my physician assistant Chanda Leslie PA-C    I reviewed the patient's Past Medical History, Past Surgical History, Medications, and Allergies.      Physical Exam:  Vitals:    09/20/21 1030 09/20/21 1115 09/20/21 1408 09/20/21 1413   BP:  (!) 92/54 109/70 102/67   Pulse:  79 81 64   Resp:  15 16 16   Temp:  97.3 °F (36.3 °C)     TempSrc:  Oral     SpO2: 93%  95% 96%   Weight:       Height:           Physical Examination: General appearance - ill-appearing  Mental status - drowsy  Eyes - pupils equal and reactive, extraocular eye movements intact  Neck - supple, no significant adenopathy  Chest - clear to auscultation, no wheezes, rales or rhonchi, symmetric air entry  Heart - normal rate, regular rhythm, normal S1, S2, no murmurs, rubs, clicks or gallops  Abdomen - soft, nontender, nondistended, no masses or organomegaly  Extremities - pedal edema 2 +        Impression: 80year old male with a history of HTN, HLD, DM, CHF, atrial fibrillation on Coumadin, and BELKYS on BiPAP admitted with pneumonia, transaminitis, and weight loss. EGD + colonoscopy showed severe esophagitis, gastric ulcer, and colon polyp. Recommendation:  1. Continue supportive care  2. Monitor LFTs  3. Monitor and document output  4. Continue Pantoprazole 40 mg BID  5. Avoid NSAIDs  6. Await pathology results  7. HSV, CMV, and EBV pending   8. Liver biopsy scheduled for today  9. Encourage nutrition with supplementation  10. PT/OT  11. Hematology following  12. Will follow      Milli Wilkinson PA-C  9:50 AM 9/20/2021                      80year old male with a history of HTN, HLD, DM, CHF, atrial fibrillation on Coumadin, and BELKYS on BiPAP admitted with pneumonia, transaminitis, and weight loss. EGD + colonoscopy showed severe esophagitis, gastric ulcer, and colon polyp. CT negative    Continue supportive care. Elevated LFTs, likely due to passive congestion. Weight loss of unknown etiology. HIV screen negative.  Consider CTA to rule out mesenteric ischemia    Deangelo Jacobson MD          (O) [phone]  [facility]

## 2021-09-20 NOTE — PROGRESS NOTES
Occupational Therapy Daily Treatment Note    Unit: [facility]  Date:  9/20/2021  Patient Name:    Sharee Allen  Admitting diagnosis:  General weakness [R53.1]  Elevated brain natriuretic peptide (BNP) level [R79.89]  Supratherapeutic INR [R79.1]  Supratherapeutic international normalized ratio (INR) [R79.1]  Pneumonia due to infectious organism, unspecified laterality, unspecified part of lung [J18.9]  Admit Date:  9/11/2021  Precautions/Restrictions:  Fall risk, Bed/chair alarm, Lines -Valentine catheter, Telemetry and Continuous pulse oximetry    Treatment Time:  4930-1745  Treatment number:  6   Total Treatment Time:   25 minutes    Patient Goals for Session:  \" Pt agreeable to bed exercises  \"      Discharge Recommendations: SNF  DME needs for discharge: defer to facility       Therapy recommendations for staff:  Assist of 1 to sit at EOB  Wear foot drop boots at night     History of Present Illness: per H&P on 9-11-21 [facility] FUNG   80 y. o. male history of hypertension hyperlipidemia A. fib, CHF who presents to the emergency department complaining of generalized weakness fatigue, falls. Robby Odom found to have hypoglycemia earlier today with a blood glucose into the 40s.  Former diabetic form of Metformin no longer on medications for this with improved A1c.  Patient has had weight loss in the last month over 15 pounds unintentionally.  Family was concerned over the last 1 month increasing fatigue and difficulty with completing ADLs.  Has had difficulty now getting out of bed in the last 24 hours requiring assistance.  Had a fall down on on the ground but did not hit head.  Is on Coumadin for history of A. fib.  No chest pain no shortness of breath no abdominal pain. Home Health S4 Level Recommendation:  NA    AM-PAC Score: AM-PAC Inpatient Daily Activity Raw Score: 14  Pt scored a 14/24 on the AM-PAC ADL Inpatient form.  Current research shows that an AM-PAC score of 17 or less is typically not associated with a discharge to the patient's home setting. Subjective:  Pt supine in bed upon therapist arrival. Pt agreeable to work with therapy this date only in bed     Pain   Yes   Rating: NA  Location:throughout body   Pain Medicine Status: No request made      Cognition:  Eyes closed most of treatment   A&O person, place   Able to follow 2 step commands, consistently. Balance:  NT  Bed mobility:    Supine to sit:   NT  Sit to supine:   NT  Rolling:    Not Tested  Scooting in sitting:  NT  Scooting to head of bed:   NT   Bridging:   No    Transfers:    Sit to stand:  Not Tested  Stand to sit:  Not Tested  Bed to chair:   Not Tested  Standard toilet: Not Tested  Bed to [facility]:  Not Tested    Activities of Daily Living:   UB Dressing:   Not Tested  LB Dressing:    Not Tested  UB Bathing:  Not Tested  LB Bathing:  Not Tested  Feeding:  Not Tested  Grooming:   Not Tested   Toileting:  Not Tested    Activity Tolerance:   Pt completed therapy session with min paticipation      Therapeutic Exercise:   Shoulder shrugs 12x  Shoulder flex 12x  Elbow flex/ext 12x  Wrist flex/ext 12x   Finger flex/ext 12x        Patient Education:   Role of OT    Positioning Needs: In bed, call light and needs in reach. Alarm Set    Family Present:  Wife present during therapy     Assessment: Pt only wanting to participate in bed exercises today. The pts wife stated the pt had a bad night. The pt kept eyes closed most of the treatment however the pt did converse with the therapist . The pt is to go for a liver biopsy today. Pt may benefit from continued skilled occupational therapy while in the hospital in order to progress to a safe and more indpt level of functioning. GOALS  To be met in 3 Visits:  1). Bed to toilet/BSC: Mod A  and 2 persons with RW     To be met in 5 Visits:  1). Supine to/from [facility]:             DDT A   2). Upper Body Bathing:         CGA  3). Lower Body Bathing:         Mod A   4). Upper Body Dressing:       Min A   5). Lower Body Dressing:       Mod A  and Max A   6).  Pt to demonstrate UE exs x 15 reps with minimal cues      Plan: cont with POC      Vearl Linares OTR/L [id number]        If patient discharges from this facility prior to next visit, this note will serve as the Discharge Summary

## 2021-09-20 NOTE — CONSULTS
[id number] [facility] Wound Ostomy Continence Nurse  Follow-up Progress Note       NAME:  Deana Scott  MEDICAL RECORD NUMBER:  [id number]  AGE:  80 y.o. GENDER:  male  :  1950  TODAY'S DATE:  2021    Subjective: Pt is alert and oriented, glucose level low at this time. Wife at bedside. Wound Identification:  Wound Type: venous and pressure  Contributing Factors: edema, venous stasis, diabetes, poor glucose control, chronic pressure, decreased mobility and shear force        Patient Goal of Care:  [x] Wound Healing  [] Odor Control  [] Palliative Care  [x] Pain Control   [] Other:     Objective:    BP 88/62   Pulse 59   Temp 97.6 °F (36.4 °C) (Oral)   Resp 16   Ht 5' 9\" (1.753 m)   Wt 142 lb 11.2 oz (64.7 kg)   SpO2 92%   BMI 21.07 kg/m²   Tanner Risk Score: Tanner Scale Score: 15  Assessment:   Coccyx:  Dark purple nonblanchable discoloration, surrounding skin red and nonblanchable. Left IT red and blanchable. Linear pruple discoloration on left posterior thigh from attends. DTOI will get worse before getting better, even with optimal treatment. Likely evolve into stage 4 pressure injury, pt has no SUBQ tissue. Discussed with wife at bedside. Bridge of nose:  Unstageable pressure injury related to BiPAP mask. Wife states area open and closes frequently at home. Left lateral LE:  Deep tissue injury, linear along lateral tibia, open. 7x1.2x0.1cm, 20% dark nonblanchable purple, 80% red, moist tissue. Surrounding skin with hemosiderin staining and pitting edema    Right posterior LE:  Partial thickness skin loss, 100% pink, moist tissue, ruptured bulla. Surrounding skin with hemosiderin staining and pitting edema        Pt has multiple areas to BUE and BLE open and weeping due to thin skin and edema. Response to treatment:  Well tolerated by patient.      Pain Assessment:  Severity:  0 / 10  Quality of pain: N/A  Wound Pain Timing/Severity: none  Premedicated: N/A  Plan:   BLE:  Cleanse open areas with NS, pat dry. Apply Xeroform gauze to open areas and cover with ABD pads. Apply DermaFit stocking (size E) to BLE from toes to below knew. Change stockings every couple days or if soiled. Sacrum, buttocks, posterior thighs, Bilateral IT's, bridge of nose:  Apply Venelex ointment 2-3 times daily and prn. Use Incontinent pads under arms and legs to wick drainage from weeping skin  TIM fluid immersion mattress. Apply Mepilex Lite dressing to bridge of nose tonight prior to applying BiPAP mask   Plan of Care:      Specialty Bed Required : Yes   [x] Low Air Loss   [] Pressure Redistribution  [x] Fluid Immersion  [] Bariatric  [] Total Pressure Relief  [] Other:     Current Diet: ADULT DIET; Regular  Adult Oral Nutrition Supplement; Standard High Calorie/High Protein Oral Supplement  Dietician consult:  Yes-Pt is malnourished, cathectic. Discharge Plan:  Placement for patient upon discharge: TBD  Patient appropriate for Outpatient [address]: Yes    Referrals:  [x]   [] [address] [place] [address]  [] Supplies  [] Other    Patient/Caregiver Teaching:  Level of patient/caregiver understanding able to:   Wide verbalizes an understanding  [] Indicates understanding       [x] Needs reinforcement  [] Unsuccessful      [] Verbal Understanding  [] Demonstrated understanding       [] No evidence of learning  [] Refused teaching         [] N/A       Electronically signed by Bryce Ornelas RN, [facility] on 9/20/2021 at 5:28 PM

## 2021-09-20 NOTE — PROGRESS NOTES
AM assessment completed, see flow sheet. Pt is confused, angry, verbally aggressive. Pt reoriented, VSS, BS 70. MD notified. Respirations are even & easy. Pt denies needs at this time. SR up x 2, and bed in low position. Call light is within reach.

## 2021-09-20 NOTE — PROGRESS NOTES
Hospitalist Progress Note      PCP: Ketan Crane MD     This is my first encounter with the patient. Chart reviewed briefly. Date of Admission: 9/11/2021    Hospital Course:     81-year-old gentleman with history of atrial fibrillation, chronic congestive heart failure, diabetes type 2, history of smoking and remote history of alcohol use  Presenting with profound weakness, progressively getting worse associated with weight loss, progressive worsening LFTs over the last several of months. Now with constipation , bilateral hydronephrosis and  pneumonia. Patient was severely coagulopathic with INR over 14. Persistent recurrent episodes of hypoglycemia  Concern for possible underlying malignancy, however so far work-up has not been revealing. Patient has been seen by GI,  oncology and urology    S/P EGD, colonoscopy -  showed severe esophagitis and gastric ulcer-likely HSV or CMV  Colonoscopy had a transverse colon polyp and internal hemorrhoids    His LFTs are persistently elevated  . Patient is profoundly weak- seen by PT, OT . Needs SNF  Oral intake is improving now. He has been having significant episodes of confusion, and hypoglycemic episodes at night. Seems to be a little better now. On IV fluids with D5 for hypoglycemia    Subjective:    9/19  Patient is doing well today awake alert and well-oriented . Oral intake has improved  Appears to have had less confusion through the night. LFTs still significantly trending up. Plan is for liver biopsy. 9/20- patient was confused this but mentation back to normal when I saw him.  Wife at bedside        Medications:  Reviewed    Infusion Medications    dextrose Stopped (09/19/21 1550)    sodium chloride      dextrose      sodium chloride 25 mL (09/15/21 0010)    dextrose       Scheduled Medications    pantoprazole  40 mg Oral BID AC    sodium chloride flush  10 mL IntraVENous 2 times per day    tamsulosin  0.8 mg Oral Daily    polyethylene glycol  17 g Oral BID    guaiFENesin  400 mg Oral BID    docusate sodium  200 mg Oral BID    sodium chloride flush  10 mL IntraVENous 2 times per day     PRN Meds: sodium chloride flush, sodium chloride, glucose, dextrose, glucagon (rDNA), dextrose, sennosides-docusate sodium, ipratropium-albuterol, sodium chloride flush, sodium chloride, potassium chloride, magnesium sulfate, promethazine **OR** ondansetron, dextrose      Intake/Output Summary (Last 24 hours) at 9/20/2021 1119  Last data filed at 9/20/2021 0014  Gross per 24 hour   Intake 271.66 ml   Output --   Net 271.66 ml       Physical Exam Performed:    /71   Pulse 90   Temp 97 °F (36.1 °C) (Oral)   Resp 18   Ht 5' 9\" (1.753 m)   Wt 142 lb 11.2 oz (64.7 kg)   SpO2 93%   BMI 21.07 kg/m²     General appearance:   Patient appears malnourished,  ill-appearing. Awake , alert and well oriented now  appearscachectic  Multiple areas of bruising and ecchymosis  HEENT: Pupils equal, round, and reactive to light. Conjunctivae/corneas clear. Neck: Supple, with full range of motion. No jugular venous distention. Trachea midline. Respiratory:  Normal respiratory effort. Clear to auscultation, bilaterally without Rales/Wheezes/Rhonchi. Cardiovascular: Irregularly irregular  Abdomen: Soft, non-tender, non-distended with normal bowel sounds. Musculoskeletal: No clubbing, cyanosis . Trace edema feet. Full range of motion without deformity. Skin: Chronic bilateral lower extremity wounds  Multiple areas of bruising and ecchymosis  Neurologic:  Neurovascularly intact without any focal sensory/motor deficits. Cranial nerves: II-XII intact, grossly non-focal.  Psychiatric: No anxiety or confusion today.   Well-oriented    Labs:   Recent Labs     09/19/21  1025   WBC 7.6   HGB 10.7*   HCT 31.4*        Recent Labs     09/18/21  0550 09/19/21  0556 09/20/21  0504   * 130* 130*   K 3.7 3.5 3.3*   CL 98* 95* 94*   CO2 29 28 25   BUN 39* 40* 38*   CREATININE <0.5* 0.6* 0.6*   CALCIUM 8.1* 8.4 8.3     Recent Labs     09/18/21  0550 09/19/21  0556 09/20/21  0504   * 301* 149*   * 246* 195*   BILITOT 0.8 0.6 0.8   ALKPHOS 394* 477* 433*     Recent Labs     09/18/21  0550 09/19/21  0556 09/20/21  0504   INR 1.85* 1.94* 1.50*     No results for input(s): Michelle Comber in the last 72 hours. Urinalysis:      Lab Results   Component Value Date    NITRU Negative 09/18/2021    WBCUA 0-2 09/12/2021    RBCUA 11-20 09/12/2021    BLOODU Negative 09/18/2021    SPECGRAV 1.025 09/18/2021    GLUCOSEU Negative 09/18/2021       Radiology:  IR MIDLINE CATH   Final Result   Successful placement of midline. CT CHEST ABDOMEN PELVIS W CONTRAST   Final Result   Multifocal pneumonia, greatest in the left lower lobe, new compared to   09/03/2021. There is a background emphysema and severe coronary artery   disease. Filling defects are seen in the left lower lobe, suggesting   sequelae from mucous plugging or aspiration. Bilateral hydronephrosis, likely secondary to markedly distended bladder. Left-sided inguinal hernia containing colon. No resultant bowel obstruction. CT HEAD WO CONTRAST   Final Result   No acute traumatic intracranial abnormality      Atrophy and small-vessel ischemic change      Probable calcified meningioma on the right. XR CHEST PORTABLE   Final Result   Left lower lobe pneumonia         VL Extremity Venous Bilateral    (Results Pending)   IR BIOPSY LIVER PERCUTANEOUS    (Results Pending)     Echocardiogram  Summary   Left ventricular systolic function is reduced with ejection fraction   estimated at 45-51 %. All remaining wall segments appear normal in function. Left ventricular size is decreased. There is moderate concentric left ventricular hypertrophy. Normal left ventricular diastolic filling pressure. The right atrium is mildly dilated. The aortic root is mildly dilated.    Mild mitral regurgitation. Mild-to-moderate aortic regurgitation is present. Mild to moderate tricuspid regurgitation. Normal systolic pulmonary artery pressure (SPAP) estimated at 31 mmHg (RA   pressure 8 mmHg). There is a pleural effusion. 1/15/2016 LVE, LVH, 55%, LAE, RVE, MAYELA        Assessment/Plan:    Multifocal pneumonia  Possible gram-negative infection  Patient has bronchiectasis  History of COPD  - on IV antibiotic -on Rocephin and doxycycline. Day 7/7 Finished antibiotics.  -Continue HHN  . Oxygen saturations are stable. Obstructive uropathy  Bilateral hydronephrosis  - Valentine catheter inserted  - Urology consulted . Likely related to severe constipation. Treat as below  -PSA normal   -Continue Flomax. Keep Valentine in. And follow-up with urology as an outpatient    Constipation  - Started on on stool regimen with good response. Patient on Colace and MiraLAX, got suppositories. + BM . Stool for occult blood was negative  -Seen by GI -> S/P  EGD and colonoscopy . EGDs showed severe esophagitis and gastric ulcer-likely HSV or CMV  Colonoscopy had a transverse colon polyp and internal hemorrhoids  Continue PPI  Check HSV, EBV, CMV       Transaminitis  Worsening LFTs  -Ongoing for several months  Associated with significant weight loss. -Etiology unclear  -ALT AST and alk phos elevated. -Bilirubin level normal  -Ammonia level normal  -Hepatitis panel negative, LINDY negative  -AFP pending  - HSV, EBV, CMV pending  -Antimitochondrial antibody pending  - CT abdomen negative. - ferritin elevated. Hemochromatosis panel pending. Supratherapeutic INR  Coagulopathy  -INR over 14  -Likely related to poor nutritional status  -Received multiple doses of oral vitamin K. INR slowly trending down. INR down from 14-> 12 --> 1.9 today. No active bleed   Monitor  -Liver biopsy today    A. fib   - on chronic Coumadin-hold as above. INR correcting now.   -Chart reviewed , discussed with patient again .  He states that he has not been on Verapamil . Heart rate stable  Checked  Echocardiogram - > normal EF  TSH normal at 1.69 . Acute metabolic encephalopathy  -With increasing confusions and evenings and nights  -Possibly sundowning  -Ammonia normal, urine analysis negative.  -Issues with hypoglycemia, blood sugars now stable but getting D5 and IV fluids. Keep rate low at 50 an hour. Monitor sodium levels  Patient is awake alert and well-oriented the mornings and early day times. He is communicating well this morning    Hypokalemia  - repleted    Hyponatremia  - na 130 - monitor with d5 in IVF    Hypoglycemia  Recurrent issues  - has lost a lot of weight  - AM cortisol level checked and normal.  Patient with persistent recurrent episodes of hypoglycemia    Severe protein calorie malnutrition  Profound weight loss  Generalized weakness and fatigue  -Encourage nutritional supplements   -consulted PT OT   Need SNF    Chronic bilateral lower extremity wounds  -Seen by wound care    Leukopenia, thrombocytopenia  Associated with elevated LFTs, coagulopathy, weight loss  - heme-onc consulted  - likely related to pneumonia   monitored white count. Normalized now. Monitor platelet ct , this is improved today  Worsening LFTs    Oncology work-up so far  Hepatitis panel negative  HIV negative  Flow cytometry negative  B12 and folate normal  LINDY negative  Rheumatoid factor negative  Elevated CRP and ferritin levels.-Likely reactive. Hemochromatosis screen pending  Factor VII and lupus anticoagulant pending      COVID-19 ruled out . Patient has been vaccinated. Rapid Covid test was negative      Diet: Diet NPO Exceptions are: Ice Chips, Sips of Water with Meds  Code Status: Full Code      Dispo - ms  Continue PT OT  He will need SNF placement. Home medications reviewed with the patient and family. patient has not been on Lipitor or verapamil for a while.   his Coumadin dose was 10 mg and 5 mg alternating prior to admission. patient was otherwise taking only Lasix, Colace and Flomax. Not on any antihypertensive medications      Updated patient and his dtr at bedside with plan of care  We discussed all current findings and work-up. No clear direction about why patient has persistent LFTs and recurrent hypoglycemia. Bilirubin level and ammonia level normal  Patient also has intermittent confusion. .      On 9/18   I was able to get in touch with hepatologist, Rayne Puri at [facility]. Rigoberto Haynes He discussed the case thoroughly with me. Appreciate the phone consult. Rigoberto Haynes His current differential diagnosis  -> Possibly related to malnutrition versus liver cirrhosis. Also on the differential is possible ischemic cause although less likely. Continue to encourage nutrition. From a cirrhosis work-up standpoint-LINDY negative, hepatitis panel negative, AFP pending,. Hemochromatosis screen pending. CT abdomen essentially negative no significant liver changes noted on the CT, no splenomegaly. He agrees with work-up so far. Recommends liver biopsy with interventional radiology as the next step. I discussed his recommendation with patient and patient's daughter at bedside. We will consult interventional radiology. Likely procedure can be performed on Monday or Tuesday. Monitor INR, platelet count.            Jackeline Beauchamp MD    9/20/2021

## 2021-09-20 NOTE — PROGRESS NOTES
bs 43. 12.5 g dextrose given. Message sent asking to return to reg diet since his procedure was successfully completed.

## 2021-09-20 NOTE — PROGRESS NOTES
ONCOLOGY HEMATOLOGY CARE PROGRESS NOTE      SUBJECTIVE:     Pt remains admitted. Had EGD and c-scope Friday - showed esophagitis and gastric ulcer. Awaits liver biopsy for possible cirrhosis. ROS:     10 point ROS completed. Pertinent positives in HPI, otherwise negative. OBJECTIVE        Physical    VITALS:  /71   Pulse 90   Temp 97 °F (36.1 °C) (Oral)   Resp 18   Ht 5' 9\" (1.753 m)   Wt 142 lb 11.2 oz (64.7 kg)   SpO2 94%   BMI 21.07 kg/m²   TEMPERATURE:  Current - Temp: 97 °F (36.1 °C); Max - Temp  Av °F (36.1 °C)  Min: 96.9 °F (36.1 °C)  Max: 97 °F (36.1 °C)  PULSE OXIMETRY RANGE: SpO2  Av.8 %  Min: 89 %  Max: 94 %  24HR INTAKE/OUTPUT:      Intake/Output Summary (Last 24 hours) at 2021 6995  Last data filed at 2021 0014  Gross per 24 hour   Intake 391.66 ml   Output --   Net 391.66 ml       CONSTITUTIONAL: awake, alert, cooperative, no apparent distress ; temporal wasting   EYES: pupils equal, round and reactive to light, sclera clear and conjunctiva normal  ENT: Normocephalic, without obvious abnormality, atraumatic  NECK: supple, symmetrical, no jugular venous distension and no carotid bruits   HEMATOLOGIC/LYMPHATIC: no cervical, supraclavicular or axillary lymphadenopathy   LUNGS: no increased work of breathing and clear to auscultation   CARDIOVASCULAR: regular rate and rhythm, normal S1 and S2, no murmur noted  ABDOMEN: normal bowel sounds x 4, soft, non-distended, non-tender, no masses palpated, no hepatosplenomgaly   MUSCULOSKELETAL: full range of motion noted, tone is normal  NEUROLOGIC: awake, alert, oriented to name, place and time. Motor skills grossly intact.    SKIN: scattered bruising and skin tears to Bilateral forearms   EXTREMITIES: no LE edema       Data      Recent Labs     21  1025   WBC 7.6   HGB 10.7*   HCT 31.4*      MCV 95.5        Recent Labs     21  0550 21  0556 21  0504 * 130* 130*   K 3.7 3.5 3.3*   CL 98* 95* 94*   CO2 29 28 25   BUN 39* 40* 38*   CREATININE <0.5* 0.6* 0.6*     Recent Labs     09/18/21  0550 09/19/21  0556 09/20/21  0504   * 301* 149*   * 246* 195*   BILITOT 0.8 0.6 0.8   ALKPHOS 394* 477* 433*       Magnesium:    Lab Results   Component Value Date    MG 1.70 09/20/2021    MG 1.80 09/19/2021    MG 1.90 09/13/2021         Problem List  Patient Active Problem List   Diagnosis    Atrial fibrillation ([facility]    Diabetes mellitus ([facility]    Hyperlipidemia    Urinary retention    Sleep apnea    Morbid obesity with BMI of 40.0-44.9, adult ([facility]    Prolonged pt (prothrombin time)    Snoring    Dyspnea    Chronic atrial fibrillation (HCC)    Essential hypertension    BELKYS treated with BiPAP    Nocturnal hypoxemia    Supratherapeutic INR    General weakness    Supratherapeutic international normalized ratio (INR)    Pneumonia due to infectious organism    Constipation    Weight loss       ASSESSMENT AND PLAN:    1. Leukopenia/TCP     - WBC stable now at 7.8 and Hgb 13 with plt 103k now   - this is new with normal CBC last month - likely from pneumonia  - marrow signal abnl on recent MRI - finding is non-specific  - Hapto is 107, , CRP high at 178, sed rate is 48, RF slightly high at 15, SPEP no bands ; Iron stores are 40, TIBC 102, and sat is normal at 39 percent. Ferritin is grossly high at 2700 ; Hepatitis is negative, IGG slightly low at 625- likely due to acute infection ; HIV pending, B12 over 2k, Folate is 12 ; LINDY pending, RF slightly high at 1  - request smear  - may need BMBX if worsening  - Flow cytometry is negative  - WBC improved, Plt improved  - checking hemochromatosis screen with high ferritin but suspect this is reactive - pending     2.  Weight Loss/C[place]cexia     - no definite malignancy on CT scan  - bilateral hydronephrosis on CT - urology planning outpatient cystoscopy  - GI consulted - last c-scope 20 years ago; EGD and C scope showed esphagitis and gastric ulcer     3. Pneumonia     - abx per IM    4. Coagulopathy   - INR over 14 9/13  - suspect from poor nutrition and pt on warfarin for a fib  - Vitamin K 10 mg PO ordered X 1 dose and a mixing study to assess for underlying inhibitor   - INR checks daily ; likely due to profound malnutrition   - checking Factor VII and lupus anticoagulant based on mixing study results - pending    5.  Elevated LFT   - liver biopsy recommended per GI      ONCOLOGIC DISPOSITION: per IM       Carmelita Page MD

## 2021-09-21 NOTE — PROGRESS NOTES
PROGRESS NOTE  S:71 yrs Patient  admitted on 9/11/2021 with General weakness [R53.1]  Elevated brain natriuretic peptide (BNP) level [R79.89]  Supratherapeutic INR [R79.1]  Supratherapeutic international normalized ratio (INR) [R79.1]  Pneumonia due to infectious organism, unspecified laterality, unspecified part of lung [J18.9] . Today he feels well. He passed 2x BMs yesterday, and is tolerating diet.      Exam:   Vitals:    09/21/21 0724   BP: (!) 97/59   Pulse: 84   Resp: 18   Temp: 97.4 °F (36.3 °C)   SpO2: 95%      General appearance: alert, appears older than stated age, cooperative, no distress and syndromic appearance - chronically ill appearing  HEENT: Neck supple with midline trachea  Neck: no adenopathy and supple, symmetrical, trachea midline  Lungs: clear to auscultation bilaterally  Heart: regular rate and rhythm, S1, S2 normal, no murmur, click, rub or gallop  Abdomen: soft, non-tender; bowel sounds normal; no masses,  no organomegaly  Extremities: edema 2+ BLE     Medications: Reviewed    Labs:  CBC:   Recent Labs     09/19/21  1025 09/20/21  2243   WBC 7.6 9.2   HGB 10.7* 10.5*   HCT 31.4* 31.2*   MCV 95.5 95.1    215     BMP:   Recent Labs     09/19/21  0556 09/20/21  0504 09/21/21  0630   * 130* 129*   K 3.5 3.3* 4.0   CL 95* 94* 95*   CO2 28 25 26   BUN 40* 38* 49*   CREATININE 0.6* 0.6* 0.6*     LIVER PROFILE:   Recent Labs     09/19/21  0556 09/20/21  0504 09/21/21  0630   * 149* 104*   * 195* 158*   PROT 4.8* 5.0* 5.0*   BILITOT 0.6 0.8 0.8   ALKPHOS 477* 433* 400*     PT/INR:   Recent Labs     09/19/21  0556 09/20/21  0504 09/21/21  0630   INR 1.94* 1.50* 1.40*       IMAGING:  VL Extremity Venous Bilateral         CT NEEDLE BIOPSY LIVER PERCUTANEOUS   Preliminary Result   Successful CT-guided random liver biopsy,         CT GUIDED NEEDLE PLACEMENT   Preliminary Result   Successful CT-guided random liver biopsy,         IR MIDLINE CATH   Final Result   Successful placement of midline. CT CHEST ABDOMEN PELVIS W CONTRAST   Final Result   Multifocal pneumonia, greatest in the left lower lobe, new compared to   09/03/2021. There is a background emphysema and severe coronary artery   disease. Filling defects are seen in the left lower lobe, suggesting   sequelae from mucous plugging or aspiration. Bilateral hydronephrosis, likely secondary to markedly distended bladder. Left-sided inguinal hernia containing colon. No resultant bowel obstruction. CT HEAD WO CONTRAST   Final Result   No acute traumatic intracranial abnormality      Atrophy and small-vessel ischemic change      Probable calcified meningioma on the right. XR CHEST PORTABLE   Final Result   Left lower lobe pneumonia           Attending Supervising [de-identified] Attestation Statement  The patient is a 80 y.o. male. I have performed a history and physical examination of the patient. I discussed the case with my physician assistant Kt Bush PA-C    I reviewed the patient's Past Medical History, Past Surgical History, Medications, and Allergies.      Physical Exam:  Vitals:    09/20/21 2215 09/21/21 0416 09/21/21 0600 09/21/21 0724   BP: 87/62 96/66  (!) 97/59   Pulse: 74 71  84   Resp:  18  18   Temp:  96.6 °F (35.9 °C)  97.4 °F (36.3 °C)   TempSrc:  Axillary  Oral   SpO2:  94%  95%   Weight:   149 lb 5.1 oz (67.7 kg)    Height:           Physical Examination: General appearance - chronically ill appearing  Mental status - alert, oriented to person, place, and time  Eyes - pupils equal and reactive, extraocular eye movements intact  Neck - supple, no significant adenopathy  Chest - clear to auscultation, no wheezes, rales or rhonchi, symmetric air entry  Heart - normal rate, regular rhythm, normal S1, S2, no murmurs, rubs, clicks or gallops  Abdomen - soft, nontender, nondistended, no masses or organomegaly  Extremities - no pedal edema noted          Impression: 80year old male with a history of HTN, HLD, DM, CHF, atrial fibrillation on Coumadin, and BELKYS on BiPAP admitted with pneumonia, transaminitis, and weight loss. EGD + colonoscopy showed severe HSV esophagitis, gastric ulcer, and benign precancerous colon polyp (negative for celiac disease and H pylori). CT negative      Recommendation:  1. Continue supportive care  2. Monitor LFTs  3. Monitor and document output  4. Continue Pantoprazole 40 mg BID  5. Avoid NSAIDs  6. Esophagus biopsy positive for HSV esophagitis  7. Start Acyclovir 400 mg TID x 7 days  8. CMV and EBV negative   9. Liver biopsy performed yesterday - await pathology  10. Encourage nutrition with supplementation  11. PT/OT  12. Hematology following  13. Will follow      Radha Short PA-C  11:03 AM 9/21/2021                      80year old male with a history of HTN, HLD, DM, CHF, atrial fibrillation on Coumadin, and BELKYS on BiPAP admitted with pneumonia, transaminitis, and weight loss. EGD + colonoscopy showed HSV esophagitis, gastric ulcer, and colon polyp. CT negative     Continue supportive care. Start Acyclovir for HSV esophagitis. Elevated LFTs, likely due to passive congestion. Liver biopsy pending. Weight loss of unknown etiology. HIV screen negative.  Consider CTA to rule out mesenteric ischemia    Cullen Alberto MD          (O) [phone]

## 2021-09-21 NOTE — PLAN OF CARE
Nutrition Problem #1: Severe malnutrition  Intervention: Food and/or Nutrient Delivery: Continue Current Diet, Modify Oral Nutrition Supplement  Nutritional Goals: patient will consume 75% or greater of meals on ADULT DIET;  Regular + accept and consume 75% or greater of Ensure Enlive four times daily with all meals + evening snack

## 2021-09-21 NOTE — PROGRESS NOTES
Pt had a BM. Provided gibson care, wiped pt down with bath wipes and applied fresh brief. New mepilex placed on coccyx. Fresh gown provided and linen changed. Transferred pt over to [facility] bed and repositioned. Lunch heated up and provided pt with warm wash cloth. Pt denies further needs at this time.

## 2021-09-21 NOTE — PROGRESS NOTES
Pharmacy Note  Warfarin Consult  Dx: afib  Goal INR range 2-3   Home Warfarin dose:alternates 5mg and 10mg     Date  INR  Warfarin  9/21                1.40                10mg  Recommend Warfarin 10mg tonight x1. Daily INR ordered. Rx will continue to manage therapy per consult order.   Shekhar Triplett D 9/21/20212:12 PM  .

## 2021-09-21 NOTE — PROGRESS NOTES
Hospitalist Progress Note      PCP: Shirin Lai MD       Date of Admission: 9/11/2021    Hospital Course:     29-year-old gentleman with history of atrial fibrillation, chronic congestive heart failure, diabetes type 2, history of smoking and remote history of alcohol use  Presenting with profound weakness, progressively getting worse associated with weight loss, progressive worsening LFTs over the last several of months. Now with constipation , bilateral hydronephrosis and  pneumonia. Patient was severely coagulopathic with INR over 14. Persistent recurrent episodes of hypoglycemia  Concern for possible underlying malignancy, however so far work-up has not been revealing. Patient has been seen by GI,  oncology and urology    S/P EGD, colonoscopy -  showed severe esophagitis and gastric ulcer-likely HSV or CMV  Colonoscopy had a transverse colon polyp and internal hemorrhoids    His LFTs are persistently elevated  . Patient is profoundly weak- seen by PT, OT . Needs SNF  Oral intake is improving now. He has been having significant episodes of confusion, and hypoglycemic episodes at night. Seems to be a little better now. On IV fluids with D5 for hypoglycemia    Subjective:    9/19  Patient is doing well today awake alert and well-oriented . Oral intake has improved  Appears to have had less confusion through the night. LFTs still significantly trending up. Plan is for liver biopsy. 9/20- patient was confused this but mentation back to normal when I saw him. Wife at bedside    9/21-patient had liver biopsy yesterday. Awake and alert. PT and OT consult pending. Hemochromatosis mutation negative.       Medications:  Reviewed    Infusion Medications    dextrose Stopped (09/19/21 1550)    sodium chloride      dextrose      sodium chloride 25 mL (09/15/21 0010)    dextrose       Scheduled Medications    acyclovir  400 mg Oral TID    warfarin (COUMADIN) daily dosing (placeholder)   Other RX Placeholder    warfarin  10 mg Oral Once    Venelex   Topical BID    pantoprazole  40 mg Oral BID AC    sodium chloride flush  10 mL IntraVENous 2 times per day    tamsulosin  0.8 mg Oral Daily    polyethylene glycol  17 g Oral BID    guaiFENesin  400 mg Oral BID    docusate sodium  200 mg Oral BID    sodium chloride flush  10 mL IntraVENous 2 times per day     PRN Meds: sodium chloride flush, sodium chloride, glucose, dextrose, glucagon (rDNA), dextrose, sennosides-docusate sodium, ipratropium-albuterol, sodium chloride flush, sodium chloride, potassium chloride, magnesium sulfate, promethazine **OR** ondansetron, dextrose    No intake or output data in the 24 hours ending 09/21/21 1603    Physical Exam Performed:    BP (!) 97/59   Pulse 84   Temp 97.4 °F (36.3 °C) (Oral)   Resp 18   Ht 5' 9\" (1.753 m)   Wt 149 lb 5.1 oz (67.7 kg)   SpO2 95%   BMI 22.05 kg/m²     General appearance:   Patient appears malnourished,  ill-appearing. Awake , alert and well oriented now  appearscachectic  Multiple areas of bruising and ecchymosis  HEENT: Pupils equal, round, and reactive to light. Conjunctivae/corneas clear. Neck: Supple, with full range of motion. No jugular venous distention. Trachea midline. Respiratory:  Normal respiratory effort. Clear to auscultation, bilaterally without Rales/Wheezes/Rhonchi. Cardiovascular: Irregularly irregular  Abdomen: Soft, non-tender, non-distended with normal bowel sounds. Musculoskeletal: No clubbing, cyanosis . Trace edema feet. Full range of motion without deformity. Skin: Chronic bilateral lower extremity wounds  Multiple areas of bruising and ecchymosis  Neurologic:  Neurovascularly intact without any focal sensory/motor deficits. Cranial nerves: II-XII intact, grossly non-focal.  Psychiatric: No anxiety or confusion today.   Well-oriented    Labs:   Recent Labs     09/19/21  1025 09/20/21  2243   WBC 7.6 9.2   HGB 10.7* 10.5*   HCT 31.4* 31.2*    215 Recent Labs     09/19/21  0556 09/20/21  0504 09/21/21  0630   * 130* 129*   K 3.5 3.3* 4.0   CL 95* 94* 95*   CO2 28 25 26   BUN 40* 38* 49*   CREATININE 0.6* 0.6* 0.6*   CALCIUM 8.4 8.3 8.4     Recent Labs     09/19/21  0556 09/20/21  0504 09/21/21  0630   * 149* 104*   * 195* 158*   BILITOT 0.6 0.8 0.8   ALKPHOS 477* 433* 400*     Recent Labs     09/19/21  0556 09/20/21  0504 09/21/21  0630   INR 1.94* 1.50* 1.40*     No results for input(s): CKTOTAL, TROPONINI in the last 72 hours. Urinalysis:      Lab Results   Component Value Date    NITRU Negative 09/18/2021    WBCUA 0-2 09/12/2021    RBCUA 11-20 09/12/2021    BLOODU Negative 09/18/2021    SPECGRAV 1.025 09/18/2021    GLUCOSEU Negative 09/18/2021       Radiology:  VL Extremity Venous Bilateral         CT NEEDLE BIOPSY LIVER PERCUTANEOUS   Final Result   Successful CT-guided random liver biopsy,         CT GUIDED NEEDLE PLACEMENT   Final Result   Successful CT-guided random liver biopsy,         IR MIDLINE CATH   Final Result   Successful placement of midline. CT CHEST ABDOMEN PELVIS W CONTRAST   Final Result   Multifocal pneumonia, greatest in the left lower lobe, new compared to   09/03/2021. There is a background emphysema and severe coronary artery   disease. Filling defects are seen in the left lower lobe, suggesting   sequelae from mucous plugging or aspiration. Bilateral hydronephrosis, likely secondary to markedly distended bladder. Left-sided inguinal hernia containing colon. No resultant bowel obstruction. CT HEAD WO CONTRAST   Final Result   No acute traumatic intracranial abnormality      Atrophy and small-vessel ischemic change      Probable calcified meningioma on the right.          XR CHEST PORTABLE   Final Result   Left lower lobe pneumonia           Component Collected Lab   H63D Hemochrom Mut 09/14/2021  6:10 AM [facility]   Negative    C282Y Hemochrom Mut 09/14/2021  6:10 AM [facility]   Negative Hemochromatosis Gene 09/14/2021  6:10 AM [facility]   See Note      Component Value Ref Range & Units Status Collected Lab   PTT D 67High   32 - 48 sec Final 09/16/2021  5:53 AM [facility]   PT D 17. 2High   12.0 - 15.5 sec Final 09/16/2021  5:53 AM [facility]   Thrombin Time 18.3  14.7 - 19.5 sec Final 09/16/2021  5:53 AM [facility]   Reptilase Tm Not Applicable  <=22.4 sec Final 09/16/2021  5:53 AM [facility]   Ptt-D Mireille Reflex 55High   32 - 48 sec Final 09/16/2021  5:53 AM [facility]   dRVVT Screen 38  33 - 44 sec Final 09/16/2021  5:53 AM [facility]   DRVVT,DIL Not Applicable  33 - 44 sec Final 09/16/2021  5:53 AM [facility]   Lup Anticoag Interp See Note   Final 09/16/2021  5:53 AM [facility]   Lupus anticoagulant detected. Echocardiogram  Summary   Left ventricular systolic function is reduced with ejection fraction   estimated at 45-51 %. All remaining wall segments appear normal in function. Left ventricular size is decreased. There is moderate concentric left ventricular hypertrophy. Normal left ventricular diastolic filling pressure. The right atrium is mildly dilated. The aortic root is mildly dilated. Mild mitral regurgitation. Mild-to-moderate aortic regurgitation is present. Mild to moderate tricuspid regurgitation. Normal systolic pulmonary artery pressure (SPAP) estimated at 31 mmHg (RA   pressure 8 mmHg). There is a pleural effusion. 1/15/2016 LVE, LVH, 55%, LAE, RVE, MAYELA        Assessment/Plan:    Multifocal pneumonia  Possible gram-negative infection  Patient has bronchiectasis  History of COPD  - on IV antibiotic -on Rocephin and doxycycline. Day 7/7 Finished antibiotics.  -Continue HHN  . Oxygen saturations are stable. Obstructive uropathy  Bilateral hydronephrosis  - Valentine catheter inserted  - Urology consulted . Likely related to severe constipation. Treat as below  -PSA normal   -Continue Flomax. Keep Valentine in.   And follow-up with urology as an outpatient    Constipation  - Started on on stool regimen with good response. Patient on Colace and MiraLAX, got suppositories. + BM . Stool for occult blood was negative  -Seen by GI -> S/P  EGD and colonoscopy . EGDs showed severe esophagitis and gastric ulcer-likely HSV or CMV  Colonoscopy had a transverse colon polyp and internal hemorrhoids  Continue PPI  Check HSV, EBV, CMV       Transaminitis  Worsening LFTs  -Ongoing for several months  Associated with significant weight loss. -Etiology unclear  -ALT AST and alk phos elevated. -Bilirubin level normal  -Ammonia level normal  -Hepatitis panel negative, LINDY negative  -AFP pending  - HSV, EBV, CMV pending  -Antimitochondrial antibody pending  - CT abdomen negative. - ferritin elevated. Hemochromatosis panel negative    Supratherapeutic INR  Coagulopathy  -INR over 14  -Likely related to poor nutritional status  -Received multiple doses of oral vitamin K. INR slowly trending down. INR improved  No active bleed   Monitor  -Liver biopsy today    A. fib   - on chronic Coumadin-hold as above. INR correcting now. -Chart reviewed , discussed with patient again . He states that he has not been on Verapamil . Heart rate stable  Checked  Echocardiogram - > normal EF  TSH normal at 1.69 . Acute metabolic encephalopathy  -With increasing confusions and evenings and nights  -Possibly sundowning  -Ammonia normal, urine analysis negative.  -Issues with hypoglycemia, blood sugars now stable but getting D5 and IV fluids. Keep rate low at 50 an hour. Monitor sodium levels  Patient is awake alert and well-oriented the mornings and early day times.   He is communicating well this morning    Hypokalemia  - repleted    Hyponatremia  - na 130 - monitor with d5 in IVF    Hypoglycemia  Recurrent issues  - has lost a lot of weight  - AM cortisol level checked and normal.  Patient with persistent recurrent episodes of hypoglycemia    Severe protein calorie malnutrition  Profound weight loss  Generalized weakness and fatigue  -Encourage nutritional supplements   -consulted PT OT   Need SNF    Chronic bilateral lower extremity wounds  -Seen by wound care    Leukopenia, thrombocytopenia  Associated with elevated LFTs, coagulopathy, weight loss  - heme-onc consulted  - likely related to pneumonia   monitored white count. Normalized now. Monitor platelet ct , this is improved today  Worsening LFTs    Oncology work-up so far  Hepatitis panel negative  HIV negative  Flow cytometry negative  B12 and folate normal  LINDY negative  Rheumatoid factor negative  Elevated CRP and ferritin levels.-Likely reactive. Hemochromatosis screen pending  Factor VII and lupus anticoagulant pending      COVID-19 ruled out . Patient has been vaccinated. Rapid Covid test was negative    Will need rehab. I spoke to his wife and his daughter -who is a pediatrician. Diet: ADULT DIET; Regular  Adult Oral Nutrition Supplement; Standard High Calorie/High Protein Oral Supplement  Code Status: Full Code      Dispo - ms  Continue PT OT  He will need SNF placement. Home medications reviewed with the patient and family. patient has not been on Lipitor or verapamil for a while. his Coumadin dose was 10 mg and 5 mg alternating prior to admission. patient was otherwise taking only Lasix, Colace and Flomax. Not on any antihypertensive medications      Updated patient and his dtr at bedside with plan of care  We discussed all current findings and work-up. No clear direction about why patient has persistent LFTs and recurrent hypoglycemia. Bilirubin level and ammonia level normal  Patient also has intermittent confusion. .      On 9/18   I was able to get in touch with hepatologist, Ish Cartwright at [facility]. Divya Washburn He discussed the case thoroughly with me. Appreciate the phone consult. Divya Washburn His current differential diagnosis  -> Possibly related to malnutrition versus liver cirrhosis.   Also on the differential is possible ischemic cause

## 2021-09-21 NOTE — CONSULTS
Writer met with pt and pt's spouse at bedside for f/u r/t palliative options as Radha SUAZO, informed me family had questions. Multiple good questions asked and answered. Hospice list provided to the spouse. Pt's spouse states still awaiting liver biopsy results and plan is STR, then possible home with daughter and family with Palliative and [facility] vrs home with hospice. Palliative Care following peripherally.

## 2021-09-21 NOTE — PROGRESS NOTES
ONCOLOGY HEMATOLOGY CARE PROGRESS NOTE      SUBJECTIVE:     Pt remains admitted. Had EGD and c-scope Friday - showed esophagitis and gastric ulcer. S/P liver biopsy 21 - pathology pending. ROS:     10 point ROS completed. Pertinent positives in HPI, otherwise negative. OBJECTIVE        Physical    VITALS:  BP (!) 97/59   Pulse 84   Temp 97.4 °F (36.3 °C) (Oral)   Resp 18   Ht 5' 9\" (1.753 m)   Wt 142 lb 11.2 oz (64.7 kg)   SpO2 95%   BMI 21.07 kg/m²   TEMPERATURE:  Current - Temp: 97.4 °F (36.3 °C); Max - Temp  Av.2 °F (36.2 °C)  Min: 96.6 °F (35.9 °C)  Max: 97.6 °F (36.4 °C)  PULSE OXIMETRY RANGE: SpO2  Av %  Min: 92 %  Max: 96 %  24HR INTAKE/OUTPUT:    No intake or output data in the 24 hours ending 21 0802    CONSTITUTIONAL: awake, alert, cooperative, no apparent distress ; temporal wasting   EYES: pupils equal, round and reactive to light, sclera clear and conjunctiva normal  ENT: Normocephalic, without obvious abnormality, atraumatic  NECK: supple, symmetrical, no jugular venous distension and no carotid bruits   HEMATOLOGIC/LYMPHATIC: no cervical, supraclavicular or axillary lymphadenopathy   LUNGS: no increased work of breathing and clear to auscultation   CARDIOVASCULAR: regular rate and rhythm, normal S1 and S2, no murmur noted  ABDOMEN: normal bowel sounds x 4, soft, non-distended, non-tender, no masses palpated, no hepatosplenomgaly   MUSCULOSKELETAL: full range of motion noted, tone is normal  NEUROLOGIC: awake, alert, oriented to name, place and time. Motor skills grossly intact.    SKIN: scattered bruising and skin tears to Bilateral forearms   EXTREMITIES: no LE edema       Data      Recent Labs     21  1025 21  2243   WBC 7.6 9.2   HGB 10.7* 10.5*   HCT 31.4* 31.2*    215   MCV 95.5 95.1        Recent Labs     21  0556 21  0504 21  0630   * 130* 129*   K 3.5 3.3* 4.0   CL 95* 94* 95*   CO2 28 25 26   BUN 40* 38* 49*   CREATININE 0.6* 0.6* 0.6*     Recent Labs     09/19/21  0556 09/20/21  0504 09/21/21  0630   * 149* 104*   * 195* 158*   BILITOT 0.6 0.8 0.8   ALKPHOS 477* 433* 400*       Magnesium:    Lab Results   Component Value Date    MG 1.70 09/20/2021    MG 1.80 09/19/2021    MG 1.90 09/13/2021         Problem List  Patient Active Problem List   Diagnosis    Atrial fibrillation ([facility]    Diabetes mellitus ([facility]    Hyperlipidemia    Urinary retention    Sleep apnea    Morbid obesity with BMI of 40.0-44.9, adult ([facility]    Prolonged pt (prothrombin time)    Snoring    Dyspnea    Chronic atrial fibrillation (HCC)    Essential hypertension    BELKYS treated with BiPAP    Nocturnal hypoxemia    Supratherapeutic INR    General weakness    Supratherapeutic international normalized ratio (INR)    Pneumonia due to infectious organism    Constipation    Weight loss    Elevated brain natriuretic peptide (BNP) level       ASSESSMENT AND PLAN:    1. Leukopenia/TCP     - WBC stable now at 7.8 and Hgb 13 with plt 103k now   - this is new with normal CBC last month - likely from pneumonia  - marrow signal abnl on recent MRI - finding is non-specific  - Hapto is 107, , CRP high at 178, sed rate is 48, RF slightly high at 15, SPEP no bands ; Iron stores are 40, TIBC 102, and sat is normal at 39 percent. Ferritin is grossly high at 2700 ;  Hepatitis is negative, IGG slightly low at 625- likely due to acute infection ; HIV neg, B12 over 2k, Folate is 12 ; LINDY pending, RF slightly high at 1  - request smear  - may need BMBX if worsening  - Flow cytometry is negative  - WBC improved, Plt improved  - checked hemochromatosis screen with high ferritin - negative     2. Weight Loss/Cahcexia     - no definite malignancy on CT scan  - bilateral hydronephrosis on CT - urology planning outpatient cystoscopy  - GI consulted - last c-scope 20 years ago; EGD and C scope showed esphagitis and gastric ulcer     3. Pneumonia     - abx per IM    4. Coagulopathy   - INR over 14 9/13  - suspect from poor nutrition and pt on warfarin for a fib  - Vitamin K 10 mg PO ordered X 1 dose and a mixing study to assess for underlying inhibitor   - INR checks daily ; likely due to profound malnutrition   - checking Factor VII and lupus anticoagulant based on mixing study results - pending    5.  Elevated LFT   - liver biopsy done 9/20/21  - will see hepatologist at [address] [place] [address]    6. (+) Lupus anticoagulant  - pt was already on blood thinners  - should recheck in 3 months to confirm no false (+)      ONCOLOGIC DISPOSITION: per IM       Susie Parra MD

## 2021-09-21 NOTE — PLAN OF CARE
Problem: Bleeding:  Goal: Will show no signs and symptoms of excessive bleeding  Description: Will show no signs and symptoms of excessive bleeding  9/21/2021 1113 by Jc Vuong RN  Outcome: Ongoing  9/20/2021 2350 by Marlene Leger RN  Outcome: Ongoing     Problem: Nutrition  Goal: Understanding of nutritional guidelines  9/21/2021 1113 by Jc Vuong RN  Outcome: Ongoing  9/20/2021 2350 by Marlene Leger RN  Outcome: Ongoing     Problem: Skin Integrity:  Goal: Absence of new skin breakdown  Description: Absence of new skin breakdown  9/21/2021 1114 by Jc Vuong RN  Outcome: Ongoing  9/21/2021 1113 by Jc Vuong RN  Outcome: Ongoing  9/20/2021 2350 by Marlene Leger RN  Outcome: Ongoing

## 2021-09-21 NOTE — PROGRESS NOTES
Midline upper right arm removed. Catheter intact, pressure applied and dressing placed. Pt tolerated well. Denies any needs at this time.

## 2021-09-21 NOTE — PROGRESS NOTES
Comprehensive Nutrition Assessment    Type and Reason for Visit:  Reassess    Nutrition Recommendations/Plan:   1. Continue ADULT DIET; Regular with Ensure Enlive four times daily with all meals + evening snack  2. Monitor appetite, meal + ONS intake. 3. Monitor weight trends, bowel function, skin integrity/wound healing, and for any additional lab/fluid disturbances. 4. Monitor GI status + GI notes for results of liver biopsy. Nutrition Assessment:  Pt is declining from a nutritional standpoint AEB po intake remains poor since previous RD assessment, and he remains at risk for further compromise d/t severe malnutrition dx, increased nutrient needs r/t presence of wounds, severe pitting edema, altered nutrition related lab values, possible GI dysfunction (awaiting liver biospy results), and episodes of new confusion during admission; Will continue ADULT DIET; Regular with Ensure Enlive four times daily with all meals + evening snack    Malnutrition Assessment:  Malnutrition Status:  Severe malnutrition    Context:  Acute Illness     Findings of the 6 clinical characteristics of malnutrition:  Energy Intake:  1 - 75% or less of estimated energy requirements for 7 or more days (x 10 days admission)  Weight Loss:  Unable to assess (d/t significant fluid accumulation/pitting edema)     Body Fat Loss:  7 - Moderate body fat loss Buccal region, Triceps, Orbital   Muscle Mass Loss:  7 - Moderate muscle mass loss Temples (temporalis), Clavicles (pectoralis & deltoids), Thigh (quadraceps), Calf (gastrocnemius)  Fluid Accumulation:  7 - Moderate to Severe Generalized, Extremities (generalized pitting; BUE +4 pitting, BLE +3 pitting)   Strength:  Not Performed    Estimated Daily Nutrient Needs:  Energy (kcal):  2438-0595 kcals based on 28-30 kcals/kg/CBW; Weight Used for Energy Requirements:  Current     Protein (g):  74-87 g protein based on 1.2-1.4 g/kg/CBW (unsure of wounds at this time);  Weight Used for Protein Requirements:  Current        Fluid (ml/day):  3316-2567 ml; Method Used for Fluid Requirements:  1 ml/kcal      Nutrition Related Findings:  noted per GI note, EGD and colonscopy on Friday 9/17 that showed HSV esophagitis + gastric ulcer; s/p liver biopsy yesterday 9/20- awaiting pathology; noted weight loss still of unknown etiology; noted pt has been having significant episodes of confusion + pt with confusion and agitation yesterday morning; noted pending pt biopsy results, tentative plan is for pt to go to [facility], then home with family with palliative HHC or home with hospice?; pt with multiple wounds; abdomen flat, soft, nontender, BS active; BM today- noted yellowish; Generalized pitting edema, BUE + 4 pitting edema, BLE + 3 pitting edema; patient has acyclovir, colace, robitussin, glycolax, flomax, warfarin, and IV D5 @ 50 ml/hr at this time; Na, Cl, Creat and H/H are low at this time; BUN, Alk phos, ALT, and AST are elevated; Wounds:  Multiple, Pressure Injury, Deep Tissue Injury, Unstageable, Partial Thickness (noted per wound care note- dark purple, nonblanchable discoloration on coccyx, Left IT red + blanchable, unstageable PI to bridge of nose r/t BiPap mask, L lateral LE DTI, R posterior LE partial thickness skin loss)       Current Nutrition Therapies:    ADULT DIET;  Regular  Adult Oral Nutrition Supplement; Standard High Calorie/High Protein Oral Supplement    Anthropometric Measures:  · Height: 5' 9\" (175.3 cm)  · Current Body Weight: 149 lb 5.1 oz (67.7 kg) (obtained 9/21/21; actual weight, however fluid accumulation)   · Admission Body Weight: 136 lb 12.8 oz (62.1 kg) (obtained 9/15/21; actual weight)    · Usual Body Weight: 136 lb 12.8 oz (62.1 kg) (obtained 9/15/21; actual weight)     · Ideal Body Weight: 160 lbs; % Ideal Body Weight 93.3 %   · BMI: 22  · BMI Categories: Underweight (BMI less than 22) age over 72       Nutrition Diagnosis:   · Severe malnutrition related to inadequate protein-energy intake, increase demand for energy/nutrients as evidenced by severe muscle loss, severe loss of subcutaneous fat, poor intake prior to admission, lab values, wounds, GI abnormality, localized or generalized fluid accumulation, weight loss, other (comment), BMI (possible GI dysfunction- liver biopsy pathology pending)    Nutrition Interventions:   Food and/or Nutrient Delivery:  Continue Current Diet, Modify Oral Nutrition Supplement  Nutrition Education/Counseling:  No recommendation at this time   Coordination of Nutrition Care:  Continue to monitor while inpatient    Goals:  patient will consume 75% or greater of meals on ADULT DIET; Regular + accept and consume 75% or greater of Ensure Enlive four times daily with all meals + evening snack       Nutrition Monitoring and Evaluation:   Behavioral-Environmental Outcomes:  None Identified   Food/Nutrient Intake Outcomes:  Food and Nutrient Intake, Supplement Intake, IVF Intake  Physical Signs/Symptoms Outcomes:  Biochemical Data, GI Status, Fluid Status or Edema, Nutrition Focused Physical Findings, Skin, Weight     Discharge Planning:     Too soon to determine     Electronically signed by Qamar Rehman RD, LD on 9/21/21 at 3:39 PM EDT    Contact: [phone]

## 2021-09-21 NOTE — PROGRESS NOTES
4 Eyes Skin Assessment     The patient is being assess for   Low Tanner Score    I agree that 2 RN's have performed a thorough Head to Toe Skin Assessment on the patient. ALL assessment sites listed below have been assessed. Areas assessed for pressure by both nurses:   [x]  Head, Face, and Ears   [x]  Shoulders, Back, and Chest, Abdomen  [x]  Arms, Elbows, and Hands   [x]  Coccyx, Sacrum, and Ischium  [x]   Legs, Feet, and Heels      Scattered bruises and abrasions BUE and BLE. Several skin tears. Entire body emaciated , all bony prominences at risk. Coccyx stage 2 as well as DTI and Mepilex re- applied. Third spacing continues. Stage 2 on nose. Skin Assessed Under all Medical Devices by both nurses:  compression stockings              All Mepilex Borders were peeled back and area peeked at by both nurses:  Yes  Please list where Mepilex Borders are located:  Coccyx and left upper thigh             **SHARE this note so that the co-signing nurse is able to place an eSignature**    Co-signer eSignature: Electronically signed by Otto Sellers RN on 3/59/16 at 5:46 PM EDT    Does the Patient have Skin Breakdown related to pressure?   Yes [facility] WOUND CARE was Initiated documentation include the Maria M-wound, Wound Assessment, Measurements, Dressing Treatment, Drainage, and Color\",              Tanner Prevention initiated:  Yes   Wound Care Orders initiated:  Yes      [id number]  nurse consulted for Pressure Injury (Stage 3,4, Unstageable, DTI, NWPT, Complex wounds)and New or Established Ostomies:  Yes      Primary Nurse eSignature: Electronically signed by Pawan Escobar RN on 9/21/21 at 5:37 PM EDT

## 2021-09-21 NOTE — FLOWSHEET NOTE
09/21/21 0724   Vital Signs   Temp 97.4 °F (36.3 °C)   Temp Source Oral   Pulse 84   Heart Rate Source Monitor   Resp 18   BP (!) 97/59   BP Location Left upper arm   Patient Position Semi fowlers   Oxygen Therapy   SpO2 95 %   O2 Device None (Room air)   Shift assessment complete. See flow sheet. Scheduled medications given, See MAR. Head to toe complete. Vital signs logged and active bowel sounds in all 4 quadrants. Pt refused colace, glycolax and robitussin. Pt repositioned in bed. No further needs noted at this time. Call light and bedside table within reach. Bed in lowest position, wheels locked and side rails up x2.      Christin Peña RN

## 2021-09-21 NOTE — PROGRESS NOTES
FSBS recheck 52, charge nurse and MD notified. MD notified of thrombus in midline. Oral glutose given. See MAR.

## 2021-09-21 NOTE — PROGRESS NOTES
Shift assessment complete. See flow sheet. Due medications administered per MD orders. See MAR. BP 77/51 HR 67, bilateral weeping edema in bilateral arms and legs. Perfect serve sent to MD, new order for CBC. Patient is alert and oriented x 4. Pt denies any present needs/concerns. Call light explained and in reach.

## 2021-09-22 NOTE — PROGRESS NOTES
Called to see pt due to him appearing very pale and difficulty getting a pulse oxy or B/P reading. B/P eventually obtained at 118/72 Only pulse oxy reading obtained was 75-89. Attempted an ABG on Left radial and was unsuccessful. Pt pulled away and refused to allow a second stick. Called pt's wife to report a change of status. Pt's wife stated that the patient asked to be a DNR_CC the other day and his wishes were not accepted. Pt and pt's wife \"Cassandra\" are in agreement that there wishes are that he be made a DNR-CC and be kept comfortable. Nessage sent to Dr Daniel Bell concerning code status.

## 2021-09-22 NOTE — PROGRESS NOTES
ONCOLOGY HEMATOLOGY CARE PROGRESS NOTE      SUBJECTIVE:     S/P liver biopsy 21 - pathology pending. Had an episode of hypoxia overnight. ROS:     10 point ROS completed. Pertinent positives in HPI, otherwise negative. OBJECTIVE        Physical    VITALS:  /72   Pulse 71   Temp 96.7 °F (35.9 °C) (Oral)   Resp 16   Ht 5' 9\" (1.753 m)   Wt 149 lb 5.1 oz (67.7 kg)   SpO2 (!) 89%   BMI 22.05 kg/m²   TEMPERATURE:  Current - Temp: 96.7 °F (35.9 °C); Max - Temp  Av.6 °F (35.9 °C)  Min: 96.5 °F (35.8 °C)  Max: 96.7 °F (35.9 °C)  PULSE OXIMETRY RANGE: SpO2  Av %  Min: 89 %  Max: 94 %  24HR INTAKE/OUTPUT:    No intake or output data in the 24 hours ending 21 0813    CONSTITUTIONAL: awake, alert, cooperative, no apparent distress ; temporal wasting   EYES: pupils equal, round and reactive to light, sclera clear and conjunctiva normal  ENT: Normocephalic, without obvious abnormality, atraumatic  NECK: supple, symmetrical, no jugular venous distension and no carotid bruits   HEMATOLOGIC/LYMPHATIC: no cervical, supraclavicular or axillary lymphadenopathy   LUNGS: no increased work of breathing and clear to auscultation   CARDIOVASCULAR: regular rate and rhythm, normal S1 and S2, no murmur noted  ABDOMEN: normal bowel sounds x 4, soft, non-distended, non-tender, no masses palpated, no hepatosplenomgaly   MUSCULOSKELETAL: full range of motion noted, tone is normal  NEUROLOGIC: awake, alert, oriented to name, place and time. Motor skills grossly intact.    SKIN: scattered bruising and skin tears to Bilateral forearms   EXTREMITIES: no LE edema       Data      Recent Labs     21  1025 21  2243   WBC 7.6 9.2   HGB 10.7* 10.5*   HCT 31.4* 31.2*    215   MCV 95.5 95.1        Recent Labs     21  0504 21  0630   * 129*   K 3.3* 4.0   CL 94* 95*   CO2 25 26   BUN 38* 49*   CREATININE 0.6* 0.6*     Recent Labs 09/20/21  0504 09/21/21  0630   * 104*   * 158*   BILITOT 0.8 0.8   ALKPHOS 433* 400*       Magnesium:    Lab Results   Component Value Date    MG 1.70 09/20/2021    MG 1.80 09/19/2021    MG 1.90 09/13/2021         Problem List  Patient Active Problem List   Diagnosis    Atrial fibrillation ([facility]    Diabetes mellitus ([facility]    Hyperlipidemia    Urinary retention    Sleep apnea    Morbid obesity with BMI of 40.0-44.9, adult (HCC)    Prolonged pt (prothrombin time)    Snoring    Dyspnea    Chronic atrial fibrillation (HCC)    Essential hypertension    BELKYS treated with BiPAP    Nocturnal hypoxemia    Supratherapeutic INR    General weakness    Supratherapeutic international normalized ratio (INR)    Pneumonia due to infectious organism    Constipation    Weight loss    Elevated brain natriuretic peptide (BNP) level       ASSESSMENT AND PLAN:    1. Leukopenia/TCP     - WBC stable now at 7.8 and Hgb 13 with plt 103k now   - this is new with normal CBC last month - likely from pneumonia  - marrow signal abnl on recent MRI - finding is non-specific  - Hapto is 107, , CRP high at 178, sed rate is 48, RF slightly high at 15, SPEP no bands ; Iron stores are 40, TIBC 102, and sat is normal at 39 percent. Ferritin is grossly high at 2700 ; Hepatitis is negative, IGG slightly low at 625- likely due to acute infection ; HIV neg, B12 over 2k, Folate is 12 ; LINDY pending, RF slightly high at 1  - request smear  - may need BMBX if worsening  - Flow cytometry is negative  - WBC improved, Plt improved  - checked hemochromatosis screen with high ferritin - negative     2. Weight Loss/Cahcexia     - no definite malignancy on CT scan  - bilateral hydronephrosis on CT - urology planning outpatient cystoscopy  - GI consulted - last c-scope 20 years ago; EGD and C scope showed esphagitis and gastric ulcer     3. Pneumonia     - abx per IM    4.  Coagulopathy   - INR over 14 9/13  - suspect from poor nutrition and pt on warfarin for a fib  - Vitamin K 10 mg PO ordered X 1 dose and a mixing study to assess for underlying inhibitor   - INR checks daily ; likely due to profound malnutrition   - checking Factor VII and lupus anticoagulant based on mixing study results - pending    5.  Elevated LFT   - liver biopsy done 9/20/21 - pathology pending  - will see hepatologist at [facility]    6. 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

## 2021-09-22 NOTE — PROGRESS NOTES
PM assessment completed, see flowsheet. Patient was not wanting to take his robitussin or glycolax, did not administer to patient. Call light within reach, bed in lowest position.

## 2021-09-22 NOTE — PROGRESS NOTES
Dr. Coy Ontiveros about patients confusion and refusal to let us assess his oxygen saturation at this time.

## 2021-09-22 NOTE — PROGRESS NOTES
Pharmacy Note  Warfarin Consult  Dx: afib  Goal INR range 2-3   Home Warfarin dose:alternates 5mg and 10mg   INR over 14 on admission, given Vit K several times    Date  INR  Warfarin  9/21                1.40                10mg  9/22                                       10mg  Recommend Warfarin 10mg tonight x1. Daily INR ordered. Rx will continue to manage therapy per consult order. Spoke with RN, unable to get blood today, pt wife does not want further blood draws. Will go ahead order 10mg today.  Await final word from wife  Mandie Spear ELIZABETH 9/21/20212:12 PM  .

## 2021-09-22 NOTE — PROGRESS NOTES
Occupational Therapy/Physical Therapy  Attempted OT/PT treatment today and initially pt cooperative for a short while letting OT reapply 02 which pt had taken off. The pt assisted with left shoulder flex 12x AAROM and elbow flex/ext 12x. The pt then took 02 off and would not let therapist don 02 again and became more agitated and confused and would not participate.    Faye Leon OTR/L [id number]

## 2021-09-22 NOTE — PROGRESS NOTES
PROGRESS NOTE  S:71 yrs Patient  admitted on 9/11/2021 with General weakness [R53.1]  Elevated brain natriuretic peptide (BNP) level [R79.89]  Supratherapeutic INR [R79.1]  Supratherapeutic international normalized ratio (INR) [R79.1]  Pneumonia due to infectious organism, unspecified laterality, unspecified part of lung [J18.9] . Today he feels well. He passed 2x BMs yesterday, and is tolerating diet. Exam:   Vitals:    09/22/21 1030   BP: (!) 116/58   Pulse: 74   Resp: 18   Temp: 96 °F (35.6 °C)   SpO2: 94%      General appearance: alert, appears older than stated age, cooperative, no distress and syndromic appearance - chronically ill appearing  HEENT: Neck supple with midline trachea  Neck: no adenopathy and supple, symmetrical, trachea midline  Lungs: clear to auscultation bilaterally  Heart: regular rate and rhythm, S1, S2 normal, no murmur, click, rub or gallop  Abdomen: soft, non-tender; bowel sounds normal; no masses,  no organomegaly  Extremities: edema 2+ BLE     Medications: Reviewed    Labs:  CBC:   Recent Labs     09/20/21  2243   WBC 9.2   HGB 10.5*   HCT 31.2*   MCV 95.1        BMP:   Recent Labs     09/20/21  0504 09/21/21  0630   * 129*   K 3.3* 4.0   CL 94* 95*   CO2 25 26   BUN 38* 49*   CREATININE 0.6* 0.6*     LIVER PROFILE:   Recent Labs     09/20/21  0504 09/21/21  0630   * 104*   * 158*   PROT 5.0* 5.0*   BILITOT 0.8 0.8   ALKPHOS 433* 400*     PT/INR:   Recent Labs     09/20/21  0504 09/21/21  0630   INR 1.50* 1.40*       IMAGING:  XR CHEST PORTABLE   Final Result   Left lower lobe pneumonia and probable small left-sided pleural effusion. Findings have increased compared to prior.          VL Extremity Venous Bilateral   Final Result      CT NEEDLE BIOPSY LIVER PERCUTANEOUS   Final Result   Successful CT-guided random liver biopsy,         CT GUIDED NEEDLE PLACEMENT   Final Result   Successful CT-guided random liver biopsy,         IR MIDLINE CATH   Final Result   Successful placement of midline. CT CHEST ABDOMEN PELVIS W CONTRAST   Final Result   Multifocal pneumonia, greatest in the left lower lobe, new compared to   09/03/2021. There is a background emphysema and severe coronary artery   disease. Filling defects are seen in the left lower lobe, suggesting   sequelae from mucous plugging or aspiration. Bilateral hydronephrosis, likely secondary to markedly distended bladder. Left-sided inguinal hernia containing colon. No resultant bowel obstruction. CT HEAD WO CONTRAST   Final Result   No acute traumatic intracranial abnormality      Atrophy and small-vessel ischemic change      Probable calcified meningioma on the right. XR CHEST PORTABLE   Final Result   Left lower lobe pneumonia           Attending [facility] Attestation Statement  The patient is a 80 y.o. male. I have performed a history and physical examination of the patient. I discussed the case with my physician assistant Gwendolyn Bledsoe PA-C    I reviewed the patient's Past Medical History, Past Surgical History, Medications, and Allergies.      Physical Exam:  Vitals:    09/21/21 1600 09/21/21 2020 09/22/21 0415 09/22/21 1030   BP: (!) 93/55 97/61 118/72 (!) 116/58   Pulse: 81 71  74   Resp: 16 16  18   Temp: 96.5 °F (35.8 °C) 96.7 °F (35.9 °C)  96 °F (35.6 °C)   TempSrc: Oral Oral  Axillary   SpO2: 94% 93% (!) 89% 94%   Weight:       Height:           Physical Examination: General appearance - chronically ill appearing  Mental status - confused  Eyes - pupils equal and reactive, extraocular eye movements intact  Neck - supple, no significant adenopathy  Chest - clear to auscultation, no wheezes, rales or rhonchi, symmetric air entry  Heart - normal rate, regular rhythm, normal S1, S2, no murmurs, rubs, clicks or gallops  Abdomen - soft, nontender, nondistended, no masses or organomegaly  Extremities - no pedal edema noted            Impression: 80year old male with a history of HTN, HLD, DM, CHF, atrial fibrillation on Coumadin, and BELKYS on BiPAP admitted with pneumonia, transaminitis, and weight loss. EGD + colonoscopy showed HSV esophagitis, gastric ulcer, and colon polyp. CT negative      Recommendation:  1. Continue supportive care  2. Monitor LFTs  3. Monitor and document output  4. Continue Pantoprazole 40 mg BID  5. Esophagus biopsy positive for HSV esophagitis  6. Continue Acyclovir 400 mg TID x 7 days  7. Liver biopsy performed - await pathology  8. Encourage nutrition with supplementation  9. PT/OT  10. Hematology following  11. Ok to d/c from [facility]  1:03 PM 9/22/2021                      80year old male with a history of HTN, HLD, DM, CHF, atrial fibrillation on Coumadin, and BELKYS on BiPAP admitted with pneumonia, transaminitis, and weight loss. EGD + colonoscopy showed HSV esophagitis, gastric ulcer, and colon polyp. CT negative. Liver biopsy showed passive congestion     Continue supportive care. Start Acyclovir for HSV esophagitis. Elevated LFTs due to passive congestion confirmed by liver biopsy. Weight loss of unknown etiology.  Consider CTA to rule out mesenteric ischemia    James Sherwood MD          (O) [phone]

## 2021-09-22 NOTE — FLOWSHEET NOTE
AM assessment completed. See flowsheet. A/Oxself. Lung sounds clear and diminished in all fields. Medications taken without difficulty. BS active x4. No c/o n/v/d. +4 pitting edema noted to BUE and BLE. No c/o pain/discomfort at this time. Bed locked and in low position. Bed alarm on. Call light in reach.      09/22/21 1030   Vital Signs   Temp 96 °F (35.6 °C)   Temp Source Axillary   Pulse 74   Heart Rate Source Monitor   Resp 18   BP (!) 116/58   BP Location Right upper arm   Patient Position Semi fowlers   Level of Consciousness Alert (0)   MEWS Score 1   Patient Currently in Pain Denies   Pain Assessment   Pain Assessment 0-10   Pain Level 0   Oxygen Therapy   SpO2 94 %   O2 Device None (Room air)

## 2021-09-22 NOTE — CARE COORDINATION
INTERDISCIPLINARY PLAN OF CARE CONFERENCE    Date/Time: 9/22/2021 4:31 PM  Completed by: ROMMEL Clinton. Case Management      Patient Name:  Ulysses Hose  YOB: 1950  Admitting Diagnosis: General weakness [R53.1]  Elevated brain natriuretic peptide (BNP) level [R79.89]  Supratherapeutic INR [R79.1]  Supratherapeutic international normalized ratio (INR) [R79.1]  Pneumonia due to infectious organism, unspecified laterality, unspecified part of lung [J18.9]     Admit Date/Time:  9/11/2021  3:19 PM    Chart reviewed. Interdisciplinary team contacted or reviewed plan related to patient progress and discharge plans. Disciplines included Case Management, Nursing, and Dietitian. Current Status:Ongoing  PT/OT recommendation for discharge plan of care: unable to work with pt today     Expected D/C Disposition:  TBD  Confirmed plan with patient and/or family Yes confirmed with: (name) pt, pt's wife, daughter and son at bedside. Pt did not participate in conversation     Discharge Plan Comments: Chart review completed. Met with pt and his family at bedside; pt did not participate in the conversation. Pt's wife and daughter stated they are still deciding on their plan as they have unanswered questions. Pt's daughter stated if pt remains confused, he won't be able to participate at a SNF and writer acknowledged this. They stated they have discussed SNF and hospice. Pt's wife was encouraged to talk with RN about no IV access as she expressed concerns with this and she stated understanding. Pt's family stated they had conversations with the MD's today but plan on following up with them tomorrow. Pt's wife inquired if the facility was called that they were interested in yesterday ([facility]) and writer explained not at this time as therapy unable to work with pt but CM can call them tomorrow. She stated agreement. They denied needs or questions for CM.     Pt's SHAKILA Dalal aware of pt's family concerns about no IV access. CM will continue to follow and assist. Please notify CM if needs or concerns arise.      Home O2 in place on admit: No

## 2021-09-22 NOTE — PLAN OF CARE
Problem: Skin Integrity:  Goal: Will show no infection signs and symptoms  Description: Will show no infection signs and symptoms  9/21/2021 2224 by James Leger RN  Outcome: Ongoing  9/21/2021 1114 by Anjum Posada RN  Outcome: Ongoing  9/21/2021 1113 by Anjum Posada RN  Outcome: Ongoing  Goal: Absence of new skin breakdown  Description: Absence of new skin breakdown  9/21/2021 2224 by James Leger RN  Outcome: Ongoing  9/21/2021 1114 by Anjum Posada RN  Outcome: Ongoing  9/21/2021 1113 by Anjum Posada RN  Outcome: Ongoing     Problem: Falls - Risk of:  Goal: Will remain free from falls  Description: Will remain free from falls  9/21/2021 2224 by James Leger RN  Outcome: Ongoing  9/21/2021 1114 by Anjum Posada RN  Outcome: Ongoing  9/21/2021 1113 by Anjum Posada RN  Outcome: Ongoing  Goal: Absence of physical injury  Description: Absence of physical injury  9/21/2021 2224 by James Leger RN  Outcome: Ongoing  9/21/2021 1114 by Anjum Posada RN  Outcome: Ongoing  9/21/2021 1113 by Anjum Posada RN  Outcome: Ongoing     Problem: Bleeding:  Goal: Will show no signs and symptoms of excessive bleeding  Description: Will show no signs and symptoms of excessive bleeding  9/21/2021 2224 by James Leger RN  Outcome: Ongoing  9/21/2021 1114 by Anjum Posada RN  Outcome: Ongoing  9/21/2021 1113 by Anjum Posada RN  Outcome: Ongoing     Problem: Nutrition  Goal: Optimal nutrition therapy  9/21/2021 2224 by James Leger RN  Outcome: Ongoing  9/21/2021 1538 by Liat Blue RD, JONATHAN  Outcome: Ongoing  9/21/2021 1114 by Anjum Posada RN  Outcome: Ongoing  9/21/2021 1113 by Anjum Posada RN  Outcome: Ongoing  Goal: Understanding of nutritional guidelines  9/21/2021 2224 by James Leger RN  Outcome: Ongoing  9/21/2021 1538 by Liat Blue RD, LD  Outcome: Ongoing  9/21/2021 1114 by Anjum Posada RN  Outcome: Ongoing  9/21/2021 1113 by Anjum Posada RN  Outcome: Ongoing     Problem: Pain:  Goal: Pain level will decrease  Description: Pain level will decrease  9/21/2021 2224 by Kam Little RN  Outcome: Ongoing  9/21/2021 1114 by Gabi Ladd RN  Outcome: Ongoing  9/21/2021 1113 by Gabi Ladd RN  Outcome: Ongoing  Goal: Control of acute pain  Description: Control of acute pain  9/21/2021 2224 by Kam Little RN  Outcome: Ongoing  9/21/2021 1114 by Gabi Ladd RN  Outcome: Ongoing  9/21/2021 1113 by Gabi Ladd RN  Outcome: Ongoing  Goal: Control of chronic pain  Description: Control of chronic pain  9/21/2021 2224 by Kam Little RN  Outcome: Ongoing  9/21/2021 1114 by Gabi Ladd RN  Outcome: Ongoing  9/21/2021 1113 by Gabi Ladd RN  Outcome: Ongoing  Goal: Patient's pain/discomfort is manageable  Description: Patient's pain/discomfort is manageable  9/21/2021 2224 by Kam Little RN  Outcome: Ongoing  9/21/2021 1114 by Gabi Ladd RN  Outcome: Ongoing     Problem: Infection:  Goal: Will remain free from infection  Description: Will remain free from infection  9/21/2021 2224 by Kam Little RN  Outcome: Ongoing  9/21/2021 1114 by Gabi Ladd RN  Outcome: Ongoing     Problem: Safety:  Goal: Free from accidental physical injury  Description: Free from accidental physical injury  9/21/2021 2224 by Kam Little RN  Outcome: Ongoing  9/21/2021 1114 by Gabi Ladd RN  Outcome: Ongoing  Goal: Free from intentional harm  Description: Free from intentional harm  9/21/2021 2224 by Kam Little RN  Outcome: Ongoing  9/21/2021 1114 by Gabi Ladd RN  Outcome: Ongoing     Problem: Daily Care:  Goal: Daily care needs are met  Description: Daily care needs are met  9/21/2021 2224 by Kam Little RN  Outcome: Ongoing  9/21/2021 1114 by Gabi Ladd RN  Outcome: Ongoing     Problem: Skin Integrity:  Goal: Skin integrity will stabilize  Description: Skin integrity will stabilize  9/21/2021 2224 by Kam Little RN  Outcome: Ongoing  9/21/2021 1114 by Sapna Londono, RN  Outcome: Ongoing     Problem: Discharge Planning:  Goal: Patients continuum of care needs are met  Description: Patients continuum of care needs are met  9/21/2021 2224 by Juvenal Cruz RN  Outcome: Ongoing  9/21/2021 1114 by Sapna Londono RN  Outcome: Ongoing

## 2021-09-22 NOTE — PROGRESS NOTES
Hospitalist Progress Note      PCP: Ketan Crane MD       Date of Admission: 9/11/2021    Hospital Course:     81-year-old gentleman with history of atrial fibrillation, chronic congestive heart failure, diabetes type 2, history of smoking and remote history of alcohol use  Presenting with profound weakness, progressively getting worse associated with weight loss, progressive worsening LFTs over the last several of months. Now with constipation , bilateral hydronephrosis and  pneumonia. Patient was severely coagulopathic with INR over 14. Persistent recurrent episodes of hypoglycemia  Concern for possible underlying malignancy, however so far work-up has not been revealing. Patient has been seen by GI,  oncology and urology    S/P EGD, colonoscopy -  showed severe esophagitis and gastric ulcer-likely HSV or CMV  Colonoscopy had a transverse colon polyp and internal hemorrhoids    His LFTs are persistently elevated  . Patient is profoundly weak- seen by PT, OT . Needs SNF  Oral intake is improving now. He has been having significant episodes of confusion, and hypoglycemic episodes at night. Seems to be a little better now. On IV fluids with D5 for hypoglycemia    Subjective:    9/19  Patient is doing well today awake alert and well-oriented . Oral intake has improved  Appears to have had less confusion through the night. LFTs still significantly trending up. Plan is for liver biopsy. 9/20- patient was confused this but mentation back to normal when I saw him. Wife at bedside    9/21-patient had liver biopsy yesterday. Awake and alert. PT and OT consult pending. Hemochromatosis mutation negative. 9/20- confused today. Had hypoglycemia again.        Medications:  Reviewed    Infusion Medications    dextrose Stopped (09/19/21 1550)    sodium chloride      dextrose      sodium chloride 25 mL (09/15/21 0010)    dextrose       Scheduled Medications    warfarin  10 mg Oral Once    acyclovir 400 mg Oral TID    warfarin (COUMADIN) daily dosing (placeholder)   Other RX Placeholder    Venelex   Topical BID    pantoprazole  40 mg Oral BID AC    sodium chloride flush  10 mL IntraVENous 2 times per day    tamsulosin  0.8 mg Oral Daily    polyethylene glycol  17 g Oral BID    guaiFENesin  400 mg Oral BID    docusate sodium  200 mg Oral BID    sodium chloride flush  10 mL IntraVENous 2 times per day     PRN Meds: sodium chloride flush, sodium chloride, glucose, dextrose, glucagon (rDNA), dextrose, sennosides-docusate sodium, ipratropium-albuterol, sodium chloride flush, sodium chloride, potassium chloride, magnesium sulfate, promethazine **OR** ondansetron, dextrose    No intake or output data in the 24 hours ending 09/22/21 1506    Physical Exam Performed:    BP (!) 116/58   Pulse 74   Temp 96 °F (35.6 °C) (Axillary)   Resp 18   Ht 5' 9\" (1.753 m)   Wt 149 lb 5.1 oz (67.7 kg)   SpO2 94%   BMI 22.05 kg/m²     General appearance:   Patient appears malnourished,  ill-appearing. Awake , alert and well oriented now  appearscachectic  Multiple areas of bruising and ecchymosis  HEENT: Pupils equal, round, and reactive to light. Conjunctivae/corneas clear. Neck: Supple, with full range of motion. No jugular venous distention. Trachea midline. Respiratory:  Normal respiratory effort. Clear to auscultation, bilaterally without Rales/Wheezes/Rhonchi. Cardiovascular: Irregularly irregular  Abdomen: Soft, non-tender, non-distended with normal bowel sounds. Musculoskeletal: No clubbing, cyanosis . Trace edema feet. Full range of motion without deformity. Skin: Chronic bilateral lower extremity wounds  Multiple areas of bruising and ecchymosis  Neurologic:  Neurovascularly intact without any focal sensory/motor deficits.  Cranial nerves: II-XII intact, grossly non-focal.  Psychiatric: confusion this am.    Labs:   Recent Labs     09/20/21  2243   WBC 9.2   HGB 10.5*   HCT 31.2*        Recent Labs 09/20/21  0504 09/21/21  0630   * 129*   K 3.3* 4.0   CL 94* 95*   CO2 25 26   BUN 38* 49*   CREATININE 0.6* 0.6*   CALCIUM 8.3 8.4     Recent Labs     09/20/21  0504 09/21/21  0630   * 104*   * 158*   BILITOT 0.8 0.8   ALKPHOS 433* 400*     Recent Labs     09/20/21  0504 09/21/21  0630   INR 1.50* 1.40*     No results for input(s): Cherlenard Gash in the last 72 hours. Urinalysis:      Lab Results   Component Value Date    NITRU Negative 09/18/2021    WBCUA 0-2 09/12/2021    RBCUA 11-20 09/12/2021    BLOODU Negative 09/18/2021    SPECGRAV 1.025 09/18/2021    GLUCOSEU Negative 09/18/2021       Radiology:  XR CHEST PORTABLE   Final Result   Left lower lobe pneumonia and probable small left-sided pleural effusion. Findings have increased compared to prior. VL Extremity Venous Bilateral   Final Result      CT NEEDLE BIOPSY LIVER PERCUTANEOUS   Final Result   Successful CT-guided random liver biopsy,         CT GUIDED NEEDLE PLACEMENT   Final Result   Successful CT-guided random liver biopsy,         IR MIDLINE CATH   Final Result   Successful placement of midline. CT CHEST ABDOMEN PELVIS W CONTRAST   Final Result   Multifocal pneumonia, greatest in the left lower lobe, new compared to   09/03/2021. There is a background emphysema and severe coronary artery   disease. Filling defects are seen in the left lower lobe, suggesting   sequelae from mucous plugging or aspiration. Bilateral hydronephrosis, likely secondary to markedly distended bladder. Left-sided inguinal hernia containing colon. No resultant bowel obstruction. CT HEAD WO CONTRAST   Final Result   No acute traumatic intracranial abnormality      Atrophy and small-vessel ischemic change      Probable calcified meningioma on the right.          XR CHEST PORTABLE   Final Result   Left lower lobe pneumonia           Component Collected Lab   H63D Hemochrom Mut 09/14/2021  6:10 AM [facility]   Negative C282Y Hemochrom Mut 09/14/2021  6:10 AM [facility]   Negative    Hemochromatosis Gene 09/14/2021  6:10 AM [facility]   See Note      Component Value Ref Range & Units Status Collected Lab   PTT D 67High   32 - 48 sec Final 09/16/2021  5:53 AM [facility]   PT D 17. 2High   12.0 - 15.5 sec Final 09/16/2021  5:53 AM [facility]   Thrombin Time 18.3  14.7 - 19.5 sec Final 09/16/2021  5:53 AM [facility]   Reptilase Tm Not Applicable  <=73.8 sec Final 09/16/2021  5:53 AM [facility]   Ptt-D Mireille Reflex 55High   32 - 48 sec Final 09/16/2021  5:53 AM [facility]   dRVVT Screen 38  33 - 44 sec Final 09/16/2021  5:53 AM [facility]   DRVVT,DIL Not Applicable  33 - 44 sec Final 09/16/2021  5:53 AM [facility]   Lup Anticoag Interp See Note   Final 09/16/2021  5:53 AM [facility]   Lupus anticoagulant detected. Echocardiogram  Summary   Left ventricular systolic function is reduced with ejection fraction   estimated at 45-51 %. All remaining wall segments appear normal in function. Left ventricular size is decreased. There is moderate concentric left ventricular hypertrophy. Normal left ventricular diastolic filling pressure. The right atrium is mildly dilated. The aortic root is mildly dilated. Mild mitral regurgitation. Mild-to-moderate aortic regurgitation is present. Mild to moderate tricuspid regurgitation. Normal systolic pulmonary artery pressure (SPAP) estimated at 31 mmHg (RA   pressure 8 mmHg). There is a pleural effusion. 1/15/2016 LVE, LVH, 55%, LAE, RVE, MAYELA    Liver biopsy  FINAL DIAGNOSIS:     Liver, biopsy:      - Liver with sinusoidal congestion, 2+ iron deposition, dilated and        edematous central areas and scattered lobular inflammation; negative        for steatosis or cirrhosis - see comment.      COMMENT:    The clinical history of elevated liver function tests is   noted.  The submitted liver biopsy is notable for a lack of steatosis,   lack of significant portal inflammatory infiltrate and fibrosis limited   to the portal area (no bridging fibrosis or cirrhosis noted).  The biopsy   does show central areas with vascular dilation and edema, extending out   to the surrounding sinusoids with sinusoidal congestion and scattered   sinusoidal inflammation noted.  Increased iron deposition (2+) is also   noted, predominantly within sinusoidal Kupffer cells.  The described   findings are suggestive for a vascular pattern of injury, possibly   related to the known history of congestive heart failure. Immunohistochemical staining for Cytomegalovirus and Herpes simplex virus   is negative.  Please correlate with clinical findings.      ROCJO/ROCJO     Assessment/Plan:    Multifocal pneumonia  Possible gram-negative infection  Patient has bronchiectasis  History of COPD  - on IV antibiotic -on Rocephin and doxycycline. Day 7/7 Finished antibiotics.  -Continue HHN  . Oxygen saturations are stable. Obstructive uropathy  Bilateral hydronephrosis  - Valentine catheter inserted  - Urology consulted . Likely related to severe constipation. Treat as below  -PSA normal   -Continue Flomax. Keep Valentine in. And follow-up with urology as an outpatient    Constipation  - Started on on stool regimen with good response. Patient on Colace and MiraLAX, got suppositories. + BM . Stool for occult blood was negative  -Seen by GI -> S/P  EGD and colonoscopy . EGDs showed severe esophagitis and gastric ulcer-likely HSV or CMV  Colonoscopy had a transverse colon polyp and internal hemorrhoids  Continue PPI  Check HSV, EBV, CMV       Transaminitis  Worsening LFTs  -Ongoing for several months  Associated with significant weight loss. -Etiology unclear  -ALT AST and alk phos elevated. -Bilirubin level normal  -Ammonia level normal  -Hepatitis panel negative, LINDY negative  -AFP pending  - HSV, EBV, CMV pending  -Antimitochondrial antibody pending  - CT abdomen negative. - ferritin elevated. Hemochromatosis panel negative  -Liver biopsy results reviewed.   Liver biopsy did not show any major evidence of cirrhosis of the liver. Will discuss further with GI. Some changes of passive congestion seen. Supratherapeutic INR  Coagulopathy  -INR over 14  -Likely related to poor nutritional status  -Received multiple doses of oral vitamin K. INR slowly trending down. INR improved  No active bleed   Monitor  -Liver biopsy done and reviewed. CHARAN pineda   - on chronic Coumadin-hold as above. INR correcting now. -Chart reviewed , discussed with patient again . He states that he has not been on Verapamil . Heart rate stable  Checked  Echocardiogram - > normal EF  TSH normal at 1.69 . Acute metabolic encephalopathy  -With increasing confusions and evenings and nights  -Possibly sundowning  -Ammonia normal, urine analysis negative.  -Issues with hypoglycemia, blood sugars now stable but getting D5 and IV fluids. Keep rate low at 50 an hour. Monitor sodium levels  Confused today    Hypokalemia  - repleted    Hyponatremia  - na 130 - monitor with d5 in IVF    Hypoglycemia  Recurrent issues  - has lost a lot of weight  - AM cortisol level checked and normal.  Patient with persistent recurrent episodes of hypoglycemia    Severe protein calorie malnutrition  Profound weight loss  Generalized weakness and fatigue  -Encourage nutritional supplements   -consulted PT OT   Need SNF    Chronic bilateral lower extremity wounds  -Seen by wound care    Leukopenia, thrombocytopenia  Associated with elevated LFTs, coagulopathy, weight loss  - heme-onc consulted  - likely related to pneumonia   monitored white count. Normalized now. Monitor platelet ct , this is improved today  Worsening LFTs    Oncology work-up so far  Hepatitis panel negative  HIV negative  Flow cytometry negative  B12 and folate normal  LINDY negative  Rheumatoid factor negative  Elevated CRP and ferritin levels.-Likely reactive.   Hemochromatosis screen pending  Factor VII and lupus anticoagulant pending      COVID-19 ruled out . Patient has been vaccinated. Rapid Covid test was negative    Discussed with wife and daughter again. Diet: ADULT DIET; Regular  Adult Oral Nutrition Supplement; Standard High Calorie/High Protein Oral Supplement  Code Status: DNR-CCA      Dispo - ms  Continue PT OT  He will need SNF placement. Home medications reviewed with the patient and family. patient has not been on Lipitor or verapamil for a while. his Coumadin dose was 10 mg and 5 mg alternating prior to admission. patient was otherwise taking only Lasix, Colace and Flomax. Not on any antihypertensive medications      Updated patient and his dtr at bedside with plan of care  We discussed all current findings and work-up. No clear direction about why patient has persistent LFTs and recurrent hypoglycemia. Bilirubin level and ammonia level normal  Patient also has intermittent confusion. .      On 9/18   I was able to get in touch with hepatologist, Domenica Eaton at [facility]. Elayne Bowman He discussed the case thoroughly with me. Appreciate the phone consult. Elayne Bowman His current differential diagnosis  -> Possibly related to malnutrition versus liver cirrhosis. Also on the differential is possible ischemic cause although less likely. Continue to encourage nutrition. From a cirrhosis work-up standpoint-LINDY negative, hepatitis panel negative, AFP pending,. Hemochromatosis screen pending. CT abdomen essentially negative no significant liver changes noted on the CT, no splenomegaly. He agrees with work-up so far. Recommends liver biopsy with interventional radiology as the next step. I discussed his recommendation with patient and patient's daughter at bedside. We will consult interventional radiology. Likely procedure can be performed on Monday or Tuesday. Monitor INR, platelet count.            Jayy Gage MD    9/22/2021

## 2021-09-22 NOTE — PLAN OF CARE
Problem: Skin Integrity:  Goal: Will show no infection signs and symptoms  Description: Will show no infection signs and symptoms  9/22/2021 1215 by Taniya Smith RN  Outcome: Ongoing  9/21/2021 2224 by James Leger RN  Outcome: Ongoing  Goal: Absence of new skin breakdown  Description: Absence of new skin breakdown  9/22/2021 1215 by Taniya Smith RN  Outcome: Ongoing  9/21/2021 2224 by James Leger RN  Outcome: Ongoing     Problem: Falls - Risk of:  Goal: Will remain free from falls  Description: Will remain free from falls  9/22/2021 1215 by Taniya Smith RN  Outcome: Ongoing  9/21/2021 2224 by James Leger RN  Outcome: Ongoing  Goal: Absence of physical injury  Description: Absence of physical injury  9/22/2021 1215 by Taniya Smith RN  Outcome: Ongoing  9/21/2021 2224 by James Leger RN  Outcome: Ongoing     Problem: Bleeding:  Goal: Will show no signs and symptoms of excessive bleeding  Description: Will show no signs and symptoms of excessive bleeding  9/22/2021 1215 by Taniya Smith RN  Outcome: Ongoing  9/21/2021 2224 by James Leger RN  Outcome: Ongoing     Problem: Nutrition  Goal: Optimal nutrition therapy  9/22/2021 1215 by Taniya Smith RN  Outcome: Ongoing  9/21/2021 2224 by James Leger RN  Outcome: Ongoing  Goal: Understanding of nutritional guidelines  9/22/2021 1215 by Taniya Smith RN  Outcome: Ongoing  9/21/2021 2224 by James Leger RN  Outcome: Ongoing     Problem: Pain:  Goal: Pain level will decrease  Description: Pain level will decrease  9/22/2021 1215 by Taniya Smith RN  Outcome: Ongoing  9/21/2021 2224 by James Leger RN  Outcome: Ongoing  Goal: Control of acute pain  Description: Control of acute pain  9/22/2021 1215 by Taniya Smith RN  Outcome: Ongoing  9/21/2021 2224 by James Leger RN  Outcome: Ongoing  Goal: Control of chronic pain  Description: Control of chronic pain  9/22/2021 1215 by Taniya Smith RN  Outcome: Ongoing  9/21/2021 2224 by James Leger RN  Outcome: Ongoing  Goal: Patient's pain/discomfort is manageable  Description: Patient's pain/discomfort is manageable  9/22/2021 1215 by Kimber Gomez RN  Outcome: Ongoing  9/21/2021 2224 by Marylee Lea, RN  Outcome: Ongoing     Problem: Infection:  Goal: Will remain free from infection  Description: Will remain free from infection  9/22/2021 1215 by Kimber Gomez RN  Outcome: Ongoing  9/21/2021 2224 by Marylee Lea, RN  Outcome: Ongoing     Problem: Safety:  Goal: Free from accidental physical injury  Description: Free from accidental physical injury  9/22/2021 1215 by Kimber Gomez RN  Outcome: Ongoing  9/21/2021 2224 by Marylee Lea, RN  Outcome: Ongoing  Goal: Free from intentional harm  Description: Free from intentional harm  9/22/2021 1215 by Kimber Gomez RN  Outcome: Ongoing  9/21/2021 2224 by Marylee Lea, RN  Outcome: Ongoing     Problem: Daily Care:  Goal: Daily care needs are met  Description: Daily care needs are met  9/22/2021 1215 by Kimber Gomez RN  Outcome: Ongoing  9/21/2021 2224 by Marylee Lea, RN  Outcome: Ongoing     Problem: Skin Integrity:  Goal: Skin integrity will stabilize  Description: Skin integrity will stabilize  9/22/2021 1215 by Kimber Gomez RN  Outcome: Ongoing  9/21/2021 2224 by Marylee Lea, RN  Outcome: Ongoing     Problem: Discharge Planning:  Goal: Patients continuum of care needs are met  Description: Patients continuum of care needs are met  9/22/2021 1215 by Kimber Gomez RN  Outcome: Ongoing  9/21/2021 2224 by Marylee Lea, RN  Outcome: Ongoing

## 2021-09-22 NOTE — PROGRESS NOTES
Consult has been called to Dr. slade on 9/22/21.  Spoke with [facility] for cardiologist. 5:40 PM    Symone Silva  9/22/2021

## 2021-09-23 NOTE — PROGRESS NOTES
Recheck pt dropped to 47, doc messaged said to push another amp. Clinical called to try and get IV accesses via US so it can be pushed.   Pt alert and refusing to drink apple juice while waiting for line placement

## 2021-09-23 NOTE — FLOWSHEET NOTE
said pt is very confused at this time, maybe his wife might benefit more.  will follow up when pt's wife id present.

## 2021-09-23 NOTE — PROGRESS NOTES
PROGRESS NOTE  S:71 yrs Patient  admitted on 9/11/2021 with General weakness [R53.1]  Elevated brain natriuretic peptide (BNP) level [R79.89]  Supratherapeutic INR [R79.1]  Supratherapeutic international normalized ratio (INR) [R79.1]  Pneumonia due to infectious organism, unspecified laterality, unspecified part of lung [J18.9] . Today he complains of fatigue, confusion, and AMS. Nurse reports patient is eating <25% of his meals, and did not pass any BMs yesterday. Exam:   Vitals:    09/23/21 0530   BP: (!) 112/59   Pulse: 72   Resp: 17   Temp: 96.1 °F (35.6 °C)   SpO2: 91%      General appearance: appears older than stated age, cooperative, fatigued, slowed mentation and syndromic appearance - chronically ill appearing  HEENT: Neck supple with midline trachea  Neck: no adenopathy and supple, symmetrical, trachea midline  Lungs: clear to auscultation bilaterally  Heart: regular rate and rhythm, S1, S2 normal, no murmur, click, rub or gallop  Abdomen: normal findings: bowel sounds normal, soft, non-tender and symmetric and abnormal findings:  mass, located in the RUQ and in the RLQ  Extremities: edema 2+ BLE     Medications: Reviewed    Labs:  CBC:   Recent Labs     09/20/21  2243   WBC 9.2   HGB 10.5*   HCT 31.2*   MCV 95.1        BMP:   Recent Labs     09/21/21 0630 09/23/21  0619   * 127*   K 4.0 4.4   CL 95* 95*   CO2 26 23   BUN 49* 72*   CREATININE 0.6* 0.7*     LIVER PROFILE:   Recent Labs     09/21/21 0630 09/23/21 0619   * 105*   * 141*   PROT 5.0* 4.8*   BILITOT 0.8 0.7   ALKPHOS 400* 433*     PT/INR:   Recent Labs     09/21/21 0630 09/23/21 0618   INR 1.40* 2.39*       IMAGING:  XR CHEST PORTABLE   Final Result   Left lower lobe pneumonia and probable small left-sided pleural effusion. Findings have increased compared to prior.          VL Extremity Venous Bilateral   Final Result      CT NEEDLE BIOPSY LIVER PERCUTANEOUS   Final Result   Successful CT-guided random liver biopsy,         CT GUIDED NEEDLE PLACEMENT   Final Result   Successful CT-guided random liver biopsy,         IR MIDLINE CATH   Final Result   Successful placement of midline. CT CHEST ABDOMEN PELVIS W CONTRAST   Final Result   Multifocal pneumonia, greatest in the left lower lobe, new compared to   09/03/2021. There is a background emphysema and severe coronary artery   disease. Filling defects are seen in the left lower lobe, suggesting   sequelae from mucous plugging or aspiration. Bilateral hydronephrosis, likely secondary to markedly distended bladder. Left-sided inguinal hernia containing colon. No resultant bowel obstruction. CT HEAD WO CONTRAST   Final Result   No acute traumatic intracranial abnormality      Atrophy and small-vessel ischemic change      Probable calcified meningioma on the right. XR CHEST PORTABLE   Final Result   Left lower lobe pneumonia           Attending Supervising [de-identified] Attestation Statement  The patient is a 80 y.o. male. I have performed a history and physical examination of the patient. I discussed the case with my physician assistant Vaughn Stuart PA-C    I reviewed the patient's Past Medical History, Past Surgical History, Medications, and Allergies.      Physical Exam:  Vitals:    09/22/21 2000 09/22/21 2007 09/23/21 0530 09/23/21 0600   BP:  117/67 (!) 112/59    Pulse:  70 72    Resp:  18 17    Temp:  96.4 °F (35.8 °C) 96.1 °F (35.6 °C)    TempSrc:  Axillary Axillary    SpO2: (!) 74% 92% 91%    Weight:    152 lb 14.4 oz (69.4 kg)   Height:           Physical Examination: General appearance - chronically ill appearing  Mental status - somnolent  Eyes - sclera anicteric  Neck - supple, no significant adenopathy  Chest - clear to auscultation, no wheezes, rales or rhonchi, symmetric air entry  Heart - normal rate, regular rhythm, normal S1, S2, no murmurs, rubs, clicks or gallops  Abdomen - soft, nontender, nondistended, no masses or organomegaly  Extremities - no pedal edema noted          Impression: 80year old male with a history of HTN, HLD, DM, CHF, atrial fibrillation on Coumadin, and BELKYS on BiPAP admitted with pneumonia, transaminitis, and weight loss. EGD + colonoscopy showed HSV esophagitis, gastric ulcer, and colon polyp. CT negative. Liver biopsy showed passive congestion. Recommendation:  1. Continue supportive care  2. Monitor LFTs  3. Monitor and document output  4. Continue Pantoprazole 40 mg BID  5. Continue Acyclovir 400 mg TID x 7 days  6. Liver biopsy showed passive congestion - negative for cirrhosis and steatosis  7. Encourage nutrition with supplementation  8. PT/OT  9. Hematology following  10. Ok to d/c from Peyman Geiger PA-C  9:38 AM 9/23/2021                      80year old male with a history of HTN, HLD, DM, CHF, atrial fibrillation on Coumadin, and BELKYS on BiPAP admitted with pneumonia, transaminitis, and weight loss. EGD + colonoscopy showed HSV esophagitis, gastric ulcer, and colon polyp. CT negative. Liver biopsy showed passive congestion     Continue supportive care. continue Acyclovir for HSV esophagitis. Elevated LFTs due to passive congestion confirmed by liver biopsy. Weight loss of unknown etiology. Amyloidosis is being considered. congored stain requested on liver biopsy by hospitalist team. Family appropriately decided on hospice.     Renae Stein MD          [phone]  [phone]

## 2021-09-23 NOTE — PROGRESS NOTES
ONCOLOGY HEMATOLOGY CARE PROGRESS NOTE      SUBJECTIVE:     S/P liver biopsy 21 - pathology showed no cirrhosis. Iron deposition in liver. Had an episode of hypoglycemia overnight. ROS:     10 point ROS completed. Pertinent positives in HPI, otherwise negative. OBJECTIVE        Physical    VITALS:  BP (!) 112/59   Pulse 72   Temp 96.1 °F (35.6 °C) (Axillary)   Resp 17   Ht 5' 9\" (1.753 m)   Wt 152 lb 14.4 oz (69.4 kg)   SpO2 91%   BMI 22.58 kg/m²   TEMPERATURE:  Current - Temp: 96.1 °F (35.6 °C); Max - Temp  Av.2 °F (35.7 °C)  Min: 96 °F (35.6 °C)  Max: 96.4 °F (35.8 °C)  PULSE OXIMETRY RANGE: SpO2  Av.8 %  Min: 74 %  Max: 94 %  24HR INTAKE/OUTPUT:      Intake/Output Summary (Last 24 hours) at 2021 0820  Last data filed at 2021 2258  Gross per 24 hour   Intake 80 ml   Output --   Net 80 ml       CONSTITUTIONAL: awake, alert, cooperative, no apparent distress ; temporal wasting   EYES: pupils equal, round and reactive to light, sclera clear and conjunctiva normal  ENT: Normocephalic, without obvious abnormality, atraumatic  NECK: supple, symmetrical, no jugular venous distension and no carotid bruits   HEMATOLOGIC/LYMPHATIC: no cervical, supraclavicular or axillary lymphadenopathy   LUNGS: no increased work of breathing and clear to auscultation   CARDIOVASCULAR: regular rate and rhythm, normal S1 and S2, no murmur noted  ABDOMEN: normal bowel sounds x 4, soft, non-distended, non-tender, no masses palpated, no hepatosplenomgaly   MUSCULOSKELETAL: full range of motion noted, tone is normal  NEUROLOGIC: awake, alert, oriented to name, place and time. Motor skills grossly intact.    SKIN: scattered bruising and skin tears to Bilateral forearms   EXTREMITIES: no LE edema       Data      Recent Labs     21  2243   WBC 9.2   HGB 10.5*   HCT 31.2*      MCV 95.1        Recent Labs     21  0630 21  0619   * 127*   K 4.0 4.4   CL 95* 95*   CO2 26 23   BUN 49* 72*   CREATININE 0.6* 0.7*     Recent Labs     09/21/21  0630 09/23/21  0619   * 105*   * 141*   BILITOT 0.8 0.7   ALKPHOS 400* 433*       Magnesium:    Lab Results   Component Value Date    MG 1.70 09/20/2021    MG 1.80 09/19/2021    MG 1.90 09/13/2021         Problem List  Patient Active Problem List   Diagnosis    Atrial fibrillation ([facility]    Diabetes mellitus ([facility]    Hyperlipidemia    Urinary retention    Sleep apnea    Morbid obesity with BMI of 40.0-44.9, adult ([facility]    Prolonged pt (prothrombin time)    Snoring    Dyspnea    Chronic atrial fibrillation (HCC)    Essential hypertension    BELKYS treated with BiPAP    Nocturnal hypoxemia    Supratherapeutic INR    General weakness    Supratherapeutic international normalized ratio (INR)    Pneumonia due to infectious organism    Constipation    Weight loss    Elevated brain natriuretic peptide (BNP) level       ASSESSMENT AND PLAN:    1. Leukopenia/TCP     - WBC stable now at 7.8 and Hgb 13 with plt 103k now   - this is new with normal CBC last month - likely from pneumonia  - marrow signal abnl on recent MRI - finding is non-specific  - Hapto is 107, , CRP high at 178, sed rate is 48, RF slightly high at 15, SPEP no bands ; Iron stores are 40, TIBC 102, and sat is normal at 39 percent. Ferritin is grossly high at 2700 ;  Hepatitis is negative, IGG slightly low at 625- likely due to acute infection ; HIV neg, B12 over 2k, Folate is 12 ; LINDY pending, RF slightly high at 1  - request smear  - may need BMBX if worsening  - Flow cytometry is negative  - WBC improved, Plt improved  - checked hemochromatosis screen with high ferritin - negative     2. Weight Loss/C[place]cexia     - no definite malignancy on CT scan  - bilateral hydronephrosis on CT - urology planning outpatient cystoscopy  - GI consulted - last c-scope 20 years ago; EGD and C scope showed esphagitis and gastric ulcer     3. Pneumonia     - abx per IM    4. Coagulopathy   - INR over 14 9/13  - suspect from poor nutrition and pt on warfarin for a fib  - Vitamin K 10 mg PO ordered X 1 dose and a mixing study to assess for underlying inhibitor   - INR checks daily ; likely due to profound malnutrition     5.  Elevated LFT   - liver biopsy done 9/20/21 - pathology showed no cirrhosis - possible congestive changes  -  GI following    6. (+) Lupus anticoagulant  - pt was already on blood thinners  - should recheck in 3 months to confirm no false (+)  - pt had catheter associated thrombosis seen on Doppler 9/21/21  - will need to resume anticoagulation once procedures completed      ONCOLOGIC DISPOSITION: per IM       Glen Cary MD

## 2021-09-23 NOTE — PLAN OF CARE
Problem: Skin Integrity:  Goal: Will show no infection signs and symptoms  Description: Will show no infection signs and symptoms  Outcome: Ongoing  Goal: Absence of new skin breakdown  Description: Absence of new skin breakdown  Outcome: Ongoing     Problem: Falls - Risk of:  Goal: Will remain free from falls  Description: Will remain free from falls  Outcome: Ongoing  Goal: Absence of physical injury  Description: Absence of physical injury  Outcome: Ongoing     Problem: Bleeding:  Goal: Will show no signs and symptoms of excessive bleeding  Description: Will show no signs and symptoms of excessive bleeding  Outcome: Ongoing     Problem: Nutrition  Goal: Optimal nutrition therapy  Outcome: Ongoing  Goal: Understanding of nutritional guidelines  Outcome: Ongoing     Problem: Pain:  Goal: Pain level will decrease  Description: Pain level will decrease  Outcome: Ongoing  Goal: Control of acute pain  Description: Control of acute pain  Outcome: Ongoing  Goal: Control of chronic pain  Description: Control of chronic pain  Outcome: Ongoing  Goal: Patient's pain/discomfort is manageable  Description: Patient's pain/discomfort is manageable  Outcome: Ongoing     Problem: Infection:  Goal: Will remain free from infection  Description: Will remain free from infection  Outcome: Ongoing     Problem: Safety:  Goal: Free from accidental physical injury  Description: Free from accidental physical injury  Outcome: Ongoing  Goal: Free from intentional harm  Description: Free from intentional harm  Outcome: Ongoing     Problem: Daily Care:  Goal: Daily care needs are met  Description: Daily care needs are met  Outcome: Ongoing     Problem: Skin Integrity:  Goal: Skin integrity will stabilize  Description: Skin integrity will stabilize  Outcome: Ongoing     Problem: Discharge Planning:  Goal: Patients continuum of care needs are met  Description: Patients continuum of care needs are met  Outcome: Ongoing     Problem: Non-Violent Restraints  Goal: Removal from restraints as soon as assessed to be safe  Outcome: Ongoing  Goal: No harm/injury to patient while restraints in use  Outcome: Ongoing  Goal: Patient's dignity will be maintained  Outcome: Ongoing

## 2021-09-23 NOTE — PROGRESS NOTES
Wife updated with pt not leaving on oxygen and becoming more agitated toward staff. Spoke with wife about our options and she was understanding of the need to place the restraints to keep pt's oxygen levels where they need to be. Clinical and charge called and updated. Security called to help if needed.

## 2021-09-23 NOTE — PROGRESS NOTES
AM assessment completed. See flowsheet. Unable to obtain VS at this time. Patient agitated and verbally aggressive towards staff when attempting to get VS. A/Oxself. Lung sounds clear and diminished in all fields. BS active x4. No c/o n/v/d. +4 pitting edema noted to BUE and BLE. No c/o pain/discomfort at this time. Bed locked and in low position. Bed alarm on. Call light in reach.

## 2021-09-23 NOTE — PROGRESS NOTES
Hospice intake RN requesting MD notification for intake and possible order changes; nocturnist notified via perfect serve at this time. Consult order, H&P, recent labs and current order printed out for hospice RN per her request, copy of in placed in chart. Will continue to monitor.   Erma Nguyen RN

## 2021-09-23 NOTE — PROGRESS NOTES
Hospitalist Progress Note      PCP: Jasper Mckeon MD       Date of Admission: 9/11/2021    Hospital Course:     60-year-old gentleman with history of atrial fibrillation, chronic congestive heart failure, diabetes type 2, history of smoking and remote history of alcohol use  Presenting with profound weakness, progressively getting worse associated with weight loss, progressive worsening LFTs over the last several of months. Now with constipation , bilateral hydronephrosis and  pneumonia. Patient was severely coagulopathic with INR over 14. Persistent recurrent episodes of hypoglycemia  Concern for possible underlying malignancy, however so far work-up has not been revealing. Patient has been seen by GI,  oncology and urology    S/P EGD, colonoscopy -  showed severe esophagitis and gastric ulcer-likely HSV or CMV  Colonoscopy had a transverse colon polyp and internal hemorrhoids    His LFTs are persistently elevated  . Patient is profoundly weak- seen by PT, OT . Needs SNF  Oral intake is improving now. He has been having significant episodes of confusion, and hypoglycemic episodes at night. Seems to be a little better now. On IV fluids with D5 for hypoglycemia    Subjective:    9/19  Patient is doing well today awake alert and well-oriented . Oral intake has improved  Appears to have had less confusion through the night. LFTs still significantly trending up. Plan is for liver biopsy. 9/20- patient was confused this but mentation back to normal when I saw him. Wife at bedside    9/21-patient had liver biopsy yesterday. Awake and alert. PT and OT consult pending. Hemochromatosis mutation negative. 9/20- confused today. Had hypoglycemia again. 9/23-doing extremely poorly. Very agitated. Blood pressure is low. Oxygen saturation is poor. Patient may be actively dying.       Medications:  Reviewed    Infusion Medications    dextrose Stopped (09/19/21 1550)    sodium chloride      09/23/21  0619   * 127*   K 4.0 4.4   CL 95* 95*   CO2 26 23   BUN 49* 72*   CREATININE 0.6* 0.7*   CALCIUM 8.4 8.3     Recent Labs     09/21/21  0630 09/23/21 0619   * 105*   * 141*   BILITOT 0.8 0.7   ALKPHOS 400* 433*     Recent Labs     09/21/21  0630 09/23/21 0618   INR 1.40* 2.39*     No results for input(s): Angela Forbes in the last 72 hours. Urinalysis:      Lab Results   Component Value Date    NITRU Negative 09/18/2021    WBCUA 0-2 09/12/2021    RBCUA 11-20 09/12/2021    BLOODU Negative 09/18/2021    SPECGRAV 1.025 09/18/2021    GLUCOSEU Negative 09/18/2021       Radiology:  XR CHEST PORTABLE   Final Result   Left lower lobe pneumonia and probable small left-sided pleural effusion. Findings have increased compared to prior. VL Extremity Venous Bilateral   Final Result      CT NEEDLE BIOPSY LIVER PERCUTANEOUS   Final Result   Successful CT-guided random liver biopsy,         CT GUIDED NEEDLE PLACEMENT   Final Result   Successful CT-guided random liver biopsy,         IR MIDLINE CATH   Final Result   Successful placement of midline. CT CHEST ABDOMEN PELVIS W CONTRAST   Final Result   Multifocal pneumonia, greatest in the left lower lobe, new compared to   09/03/2021. There is a background emphysema and severe coronary artery   disease. Filling defects are seen in the left lower lobe, suggesting   sequelae from mucous plugging or aspiration. Bilateral hydronephrosis, likely secondary to markedly distended bladder. Left-sided inguinal hernia containing colon. No resultant bowel obstruction. CT HEAD WO CONTRAST   Final Result   No acute traumatic intracranial abnormality      Atrophy and small-vessel ischemic change      Probable calcified meningioma on the right.          XR CHEST PORTABLE   Final Result   Left lower lobe pneumonia           Component Collected Lab   H63D Hemochrom Mut 09/14/2021  6:10 AM [facility]   Negative    C282Y Hemochrom Mut 09/14/2021  6:10 AM [facility]   Negative    Hemochromatosis Gene 09/14/2021  6:10 AM [facility]   See Note      Component Value Ref Range & Units Status Collected Lab   PTT D 67High   32 - 48 sec Final 09/16/2021  5:53 AM [facility]   PT D 17. 2High   12.0 - 15.5 sec Final 09/16/2021  5:53 AM [facility]   Thrombin Time 18.3  14.7 - 19.5 sec Final 09/16/2021  5:53 AM [facility]   Reptilase Tm Not Applicable  <=94.3 sec Final 09/16/2021  5:53 AM [facility]   Ptt-D Mireille Reflex 55High   32 - 48 sec Final 09/16/2021  5:53 AM [facility]   dRVVT Screen 38  33 - 44 sec Final 09/16/2021  5:53 AM [facility]   DRVVT,DIL Not Applicable  33 - 44 sec Final 09/16/2021  5:53 AM [facility]   Lup Anticoag Interp See Note   Final 09/16/2021  5:53 AM [facility]   Lupus anticoagulant detected. Echocardiogram  Summary   Left ventricular systolic function is reduced with ejection fraction   estimated at 45-51 %. All remaining wall segments appear normal in function. Left ventricular size is decreased. There is moderate concentric left ventricular hypertrophy. Normal left ventricular diastolic filling pressure. The right atrium is mildly dilated. The aortic root is mildly dilated. Mild mitral regurgitation. Mild-to-moderate aortic regurgitation is present. Mild to moderate tricuspid regurgitation. Normal systolic pulmonary artery pressure (SPAP) estimated at 31 mmHg (RA   pressure 8 mmHg). There is a pleural effusion. 1/15/2016 LVE, LVH, 55%, LAE, RVE, MAYELA    Liver biopsy  FINAL DIAGNOSIS:     Liver, biopsy:      - Liver with sinusoidal congestion, 2+ iron deposition, dilated and        edematous central areas and scattered lobular inflammation; negative        for steatosis or cirrhosis - see comment.      COMMENT:    The clinical history of elevated liver function tests is   noted.  The submitted liver biopsy is notable for a lack of steatosis,   lack of significant portal inflammatory infiltrate and fibrosis limited   to the portal area (no bridging fibrosis or cirrhosis noted).  The biopsy   does show central areas with vascular dilation and edema, extending out   to the surrounding sinusoids with sinusoidal congestion and scattered   sinusoidal inflammation noted.  Increased iron deposition (2+) is also   noted, predominantly within sinusoidal Kupffer cells.  The described   findings are suggestive for a vascular pattern of injury, possibly   related to the known history of congestive heart failure. Immunohistochemical staining for Cytomegalovirus and Herpes simplex virus   is negative.  Please correlate with clinical findings.      ROCJO/ROCJO     Assessment/Plan:    Multifocal pneumonia  Possible gram-negative infection  Patient has bronchiectasis  History of COPD  - on IV antibiotic -on Rocephin and doxycycline. Day 7/7 Finished antibiotics.  -Continue HHN  . Oxygen saturations are stable. Obstructive uropathy  Bilateral hydronephrosis  - Valentine catheter inserted  - Urology consulted . Likely related to severe constipation. Treat as below  -PSA normal   -Continue Flomax. Keep Valentine in. And follow-up with urology as an outpatient    Constipation  - Started on on stool regimen with good response. Patient on Colace and MiraLAX, got suppositories. + BM . Stool for occult blood was negative  -Seen by GI -> S/P  EGD and colonoscopy . EGDs showed severe esophagitis and gastric ulcer-likely HSV or CMV  Colonoscopy had a transverse colon polyp and internal hemorrhoids  Continue PPI  Check HSV, EBV, CMV       Transaminitis  Worsening LFTs  -Ongoing for several months  Associated with significant weight loss. -Etiology unclear  -ALT AST and alk phos elevated. -Bilirubin level normal  -Ammonia level normal  -Hepatitis panel negative, LINDY negative  -AFP pending  - HSV, EBV, CMV pending  -Antimitochondrial antibody pending  - CT abdomen negative. - ferritin elevated. Hemochromatosis panel negative  -Liver biopsy results reviewed.   Liver biopsy did not show any major evidence of cirrhosis of the liver. Due to changes of passive congestion a cardiology consultation was obtained. Amyloid staining of the liver is still pending. Patient's condition is much worse. Discussed with wife. Patient likely actively dying. Cardiologist discussed with spouse no plans for further investigation    Supratherapeutic INR  Coagulopathy  -INR over 14  -Likely related to poor nutritional status  -Received multiple doses of oral vitamin K. INR slowly trending down. INR improved  No active bleed   Monitor  -Liver biopsy done and reviewed. CHARAN pineda   - on chronic Coumadin-hold as above. INR correcting now. -Chart reviewed , discussed with patient again . He states that he has not been on Verapamil . Heart rate stable  Checked  Echocardiogram - > normal EF  TSH normal at 1.69 . Acute metabolic encephalopathy  -With increasing confusions and evenings and nights  -Possibly sundowning  -Ammonia normal, urine analysis negative.  -Issues with hypoglycemia, blood sugars now stable but getting D5 and IV fluids. Keep rate low at 50 an hour. Monitor sodium levels  Mental status much worse today    Hypokalemia  - repleted    Hyponatremia  - na 130 - monitor with d5 in IVF    Hypoglycemia  Recurrent issues  - has lost a lot of weight  - AM cortisol level checked and normal.  Patient with persistent recurrent episodes of hypoglycemia    Severe protein calorie malnutrition  Profound weight loss  Generalized weakness and fatigue  -Encourage nutritional supplements   -consulted PT OT   Need SNF    Chronic bilateral lower extremity wounds  -Seen by wound care    Leukopenia, thrombocytopenia  Associated with elevated LFTs, coagulopathy, weight loss  - heme-onc consulted  - likely related to pneumonia   monitored white count. Normalized now.   Monitor platelet ct   Worsening LFTs    Oncology work-up so far  Hepatitis panel negative  HIV negative  Flow cytometry negative  B12 and folate normal  LINDY negative  Rheumatoid factor negative  Elevated CRP and ferritin levels.-Likely reactive. Hemochromatosis screen pending  Factor VII and lupus anticoagulant pending      COVID-19 ruled out . Patient has been vaccinated. Rapid Covid test was negative    Discussed with wife and daughter again. Diet: ADULT DIET; Regular  Adult Oral Nutrition Supplement; Standard High Calorie/High Protein Oral Supplement  Code Status: DNR-CCA      Dispo - ms  Continue PT OT  He will need SNF placement. Home medications reviewed with the patient and family. patient has not been on Lipitor or verapamil for a while. his Coumadin dose was 10 mg and 5 mg alternating prior to admission. patient was otherwise taking only Lasix, Colace and Flomax. Not on any antihypertensive medications      Updated patient and his dtr at bedside with plan of care  We discussed all current findings and work-up. No clear direction about why patient has persistent LFTs and recurrent hypoglycemia. Bilirubin level and ammonia level normal  Patient also has intermittent confusion. .      On 9/18   I was able to get in touch with hepatologist, Phyllis Santos at [facility]. Araceli Hamlinies He discussed the case thoroughly with me. Appreciate the phone consult. Araceli Hamlinies His current differential diagnosis  -> Possibly related to malnutrition versus liver cirrhosis. Also on the differential is possible ischemic cause although less likely. Continue to encourage nutrition. From a cirrhosis work-up standpoint-LINDY negative, hepatitis panel negative, AFP pending,. Hemochromatosis screen pending. CT abdomen essentially negative no significant liver changes noted on the CT, no splenomegaly. He agrees with work-up so far. Recommends liver biopsy with interventional radiology as the next step. I discussed his recommendation with patient and patient's daughter at bedside. We will consult interventional radiology.    Likely procedure can be performed on Monday or Tuesday. Monitor INR, platelet count. Patient is doing very poorly. Discussed with wife in detail.         Agapito Hurst MD 9/23/2021 1:46 PM

## 2021-09-23 NOTE — PROGRESS NOTES
Patient resting in bed and not currently attempting to pull at lines. Wrist restraints removed. Will continue to monitor for need for further restraints.

## 2021-09-23 NOTE — FLOWSHEET NOTE
Family requested EOL support. RN said pt is not doing well.  offered prayer with family at bedside. [facility] of presence with family at bedside.  will follow up as needed.

## 2021-09-23 NOTE — PLAN OF CARE
Problem: Skin Integrity:  Goal: Will show no infection signs and symptoms  Description: Will show no infection signs and symptoms  9/23/2021 1958 by Sweta Mora RN  Outcome: Ongoing  9/23/2021 1853 by Jayne Andrade RN  Outcome: Ongoing  Goal: Absence of new skin breakdown  Description: Absence of new skin breakdown  9/23/2021 1958 by Sweta Mora RN  Outcome: Ongoing  9/23/2021 1853 by Jayne Andrade RN  Outcome: Ongoing     Problem: Falls - Risk of:  Goal: Will remain free from falls  Description: Will remain free from falls  9/23/2021 1958 by Sweta Mora RN  Outcome: Ongoing  9/23/2021 1853 by Jayne Andrade RN  Outcome: Ongoing  Goal: Absence of physical injury  Description: Absence of physical injury  9/23/2021 1958 by Sweta Mora RN  Outcome: Ongoing  9/23/2021 1853 by Jayne Andrade RN  Outcome: Ongoing     Problem: Bleeding:  Goal: Will show no signs and symptoms of excessive bleeding  Description: Will show no signs and symptoms of excessive bleeding  9/23/2021 1958 by Sweta Mora RN  Outcome: Ongoing  9/23/2021 1853 by Jayne Andrade RN  Outcome: Ongoing     Problem: Pain:  Goal: Pain level will decrease  Description: Pain level will decrease  9/23/2021 1958 by Sweta Mora RN  Outcome: Ongoing  9/23/2021 1853 by Jayne Andrade RN  Outcome: Ongoing  Goal: Control of acute pain  Description: Control of acute pain  9/23/2021 1958 by Sweta Mora RN  Outcome: Ongoing  9/23/2021 1853 by Jayne Andrade RN  Outcome: Ongoing  Goal: Control of chronic pain  Description: Control of chronic pain  9/23/2021 1958 by Sweta Mora RN  Outcome: Ongoing  9/23/2021 1853 by Jayne Andrade RN  Outcome: Ongoing  Goal: Patient's pain/discomfort is manageable  Description: Patient's pain/discomfort is manageable  9/23/2021 1958 by Sweta Mora RN  Outcome: Ongoing  9/23/2021 1853 by Jayne Lupe, RN  Outcome: Ongoing     Problem: Safety:  Goal: Free from accidental physical injury  Description: Free from

## 2021-09-23 NOTE — PROGRESS NOTES
Pharmacy Note  Warfarin Consult  Dx: afib  Goal INR range 2-3   Home Warfarin dose:alternates 5mg and 10mg   INR over 14 ib admission, given Vit K several times    Date  INR  Warfarin  9/21                1.40                10mg  9/22                                       10mg  9/23                2.39                hold  Recommend holding Warfarin tonight x1. Daily INR ordered. Rx will continue to manage therapy per consult order  Mary Triplett D 8/89/527753:10 AM  .      .

## 2021-09-23 NOTE — PROGRESS NOTES
Pt oxygen 74% on ra, pt agreed to put on a NC and to check his BS. FSBS 64, oral glucose given and will be checked in 15 minutes.

## 2021-09-23 NOTE — PLAN OF CARE
Problem: Skin Integrity:  Goal: Will show no infection signs and symptoms  Description: Will show no infection signs and symptoms  9/22/2021 2250 by Jin Khalil RN  Outcome: Met This Shift  9/22/2021 1215 by Julianna Mcfarlane RN  Outcome: Ongoing  Goal: Absence of new skin breakdown  Description: Absence of new skin breakdown  9/22/2021 1215 by Julianna Mcfarlane RN  Outcome: Ongoing     Problem: Falls - Risk of:  Goal: Will remain free from falls  Description: Will remain free from falls  9/22/2021 2250 by Jin Khalil RN  Outcome: Met This Shift  9/22/2021 1215 by Julianna Mcfarlane RN  Outcome: Ongoing  Goal: Absence of physical injury  Description: Absence of physical injury  9/22/2021 1215 by Julianna Mcfarlane RN  Outcome: Ongoing     Problem: Bleeding:  Goal: Will show no signs and symptoms of excessive bleeding  Description: Will show no signs and symptoms of excessive bleeding  9/22/2021 1215 by Julianna Mcfarlane RN  Outcome: Ongoing     Problem: Nutrition  Goal: Optimal nutrition therapy  9/22/2021 1215 by Julianna Mcfarlane RN  Outcome: Ongoing  Goal: Understanding of nutritional guidelines  9/22/2021 1215 by Julianna Mcfarlane RN  Outcome: Ongoing

## 2021-09-23 NOTE — PROGRESS NOTES
Restraints have been started, security was at bedside, but placement of the soft restraints went without physical altercation. Explained to the pt why we were placing them. Pt very upset and believed that we had broken into his house.

## 2021-09-23 NOTE — PROGRESS NOTES
Patient removed nasal cannula and will not allow RN to reapply. SpO2 decreased to 73% on RA. MD notified. New order received for placement of bilateral soft wrist restraints.

## 2021-09-23 NOTE — PROGRESS NOTES
Pt FS recheck 57. IV access is difficult, clinical called to help with placement. Current IVs have good blood flow, but there is a lot of leaking from the surrounding skin when they are used. Glucagon IM to be given.

## 2021-09-23 NOTE — CONSULTS
[address] [facility]  [phone]        Reason for Consultation/Chief Complaint: \"I have been having generalized weakness and fatigue. \"  Consulted for AFIB/ Edema/CHF (Dr Rex Esposito originally saw 9/13/2021)      History of Present Illness:  Violet Ledezma is a 80 y.o. patient who presented [facility] 9/11/21 with complaints of weakness and fatigue. He has PMH significant for chronic afib on coumadin, chronic diastolic CHF, DM, HLD, HTN, and BELKYS treated with BiPAP. Note Echo 1/15/2016 showed EF=55%; mild LVH; mild right chamber dilation; normal RV function. Most recent CT Chest 11/8/2018 showed main pulmonary artery dilated at 3.3 cm with cardiac chambers enlarged. Last followed by myself in 2/11/2016 and lost to f/u afterwards. He presented to ED 9/11/2021 with fatigue and weakness. He was treated for hyperglycemia, pne and urinary retention. 9/11/2021TnT <0.01; Pro BNP 4,597. My partner Dr. Rex Esposito was consulted on 9/13/2021 for management of afib. I am reconsulted today for edema/CHF/AFIB. Venous US BLE 9/21/2021  was within normal limits; no DVT noted. CXR 9/22/2021 showed LLL pne and probable small left sided pleural effusion. Last labs today: , K+ 4.4, BUN 72/Cr 0.7, , . EKG noted afib with slow ventricular response.; RBBB; ;ow voltage QRS (no significant change from 12/9/2015). Most recent Echo 9/11/2021 showed EF=45-51%; left ventricle size decreased; mod cLVH; mild to mod AR and TR. Note he had liver biopsy 9/20/21 negative for cirrhosis; possible congestive changes. He is s/p EGD/colonoscopy showing severe esophagitis and gastric ulcer. Note he is very confused and cannot give history now. ROS unable to be obtained. I have been asked to provide consultation regarding further management and testing.       Past Medical History:   has a past medical history of Atrial fibrillation ([address] CHF (congestive heart failure) ([address] Diabetes mellitus ([facility] Hyperlipidemia, Hypertension, BELKYS treated with BiPAP, and Pneumonia due to infectious organism. Surgical History:   has a past surgical history that includes hernia repair; eye surgery (12/1/15); Cataract removal with implant (Left, 03/06/2018); Colonoscopy (09/17/2021); Upper gastrointestinal endoscopy (09/17/2021); IR MIDLINE CATH (9/17/2021); Upper gastrointestinal endoscopy (N/A, 9/17/2021); Colonoscopy (N/A, 9/17/2021); and CT NEEDLE BIOPSY LIVER PERCUTANEOUS (9/20/2021). Social History:   reports that he quit smoking about 16 years ago. He has never used smokeless tobacco. He reports that he does not drink alcohol and does not use drugs. Family History:  family history includes Cancer in his father; Emphysema in his father. Home Medications:  Were reviewed and are listed in nursing record. and/or listed below  Prior to Admission medications    Medication Sig Start Date End Date Taking? Authorizing Provider   docusate sodium (COLACE) 100 MG capsule Take 2 capsules by mouth 2 times daily 9/18/21  Yes [place] Shrestha MD   polyethylene glycol (GLYCOLAX) 17 g packet Take 17 g by mouth 2 times daily 9/18/21 10/18/21 Yes [place] Shrestha MD   sennosides-docusate sodium (SENOKOT-S) 8.6-50 MG tablet Take 2 tablets by mouth daily as needed for Constipation 9/18/21  Yes [place] Shrestha MD   tamsulosin [facility]) 0.4 MG capsule Take 2 capsules by mouth daily 9/19/21  Yes [place] Shrestha MD   pantoprazole (PROTONIX) 40 MG tablet Take 1 tablet by mouth every morning (before breakfast) 9/18/21  Yes [place] Shrestha MD        Allergies:  Patient has no known allergies.      Review of Systems:   12 point ROS negative in all areas as listed below except as in [place]  Constitutional, EENT, Cardiovascular, pulmonary, GI, , Musculoskeletal, skin, neurological, hematological, endocrine, Psychiatric    Physical Examination:    Vitals:    09/23/21 0530   BP: (!) 112/59   Pulse: 72   Resp: 17   Temp: 96.1 °F (35.6 °C)   SpO2: 91%    Weight: 152 lb 14.4 oz (69.4 kg)         General Appearance:  Confused; appears chronically ill   Head:  Normocephalic, without obvious abnormality, atraumatic   Eyes:  PERRL, conjunctiva/corneas clear       Nose: Nares normal, no drainage or sinus tenderness   Throat: Lips, mucosa, and tongue normal   Neck: Supple, symmetrical, trachea midline, no adenopathy, thyroid: not enlarged, symmetric, no tenderness/mass/nodules, no carotid bruit or JVD       Lungs:   Soft clear breath sounds; respirations unlabored   Chest Wall:  No tenderness or deformity   Heart:  +irregularly irregular, S1, S2 normal, no murmur, rub or gallop   Abdomen:   Soft, non-tender, bowel sounds active all four quadrants,  no masses, no organomegaly           Extremities: 2-3+ BLE and +BUE edema   Pulses: 2+ and symmetric   Skin: Skin color, texture, turgor normal, no rashes or lesions   Pysch: Confused   Neurologic: Confused; non-focal        Labs  CBC:   Lab Results   Component Value Date    WBC 9.2 09/20/2021    RBC 3.29 09/20/2021    HGB 10.5 09/20/2021    HCT 31.2 09/20/2021    MCV 95.1 09/20/2021    RDW 14.6 09/20/2021     09/20/2021     CMP:    Lab Results   Component Value Date     09/21/2021    K 4.0 09/21/2021    CL 95 09/21/2021    CO2 26 09/21/2021    BUN 49 09/21/2021    CREATININE 0.6 09/21/2021    GFRAA >60 09/21/2021    GFRAA >60 08/03/2012    AGRATIO 1.0 09/21/2021    LABGLOM >60 09/21/2021    GLUCOSE 65 09/21/2021    PROT 5.0 09/21/2021    PROT 7.0 08/03/2012    CALCIUM 8.4 09/21/2021    BILITOT 0.8 09/21/2021    ALKPHOS 400 09/21/2021     09/21/2021     09/21/2021     PT/INR:  No results found for: PTINR  Lab Results   Component Value Date    CKTOTAL 100 08/23/2021    TROPONINI <0.01 09/11/2021       EKG:  I have reviewed EKG with the following interpretation:  Impression:  See HPI    Echo: 9/11/2021 Summary   Left ventricular systolic function is reduced with EF estimated at 45-51 %.    All remaining wall segments appear normal in function. Left ventricular size is decreased. There is moderate concentric left ventricular hypertrophy. Normal left ventricular diastolic filling pressure. The right atrium is mildly dilated. The aortic root is mildly dilated. Mild mitral regurgitation. Mild-to-moderate aortic regurgitation is present. Mild to moderate tricuspid regurgitation.   (SPAP) estimated at 31 mmHg (RA pressure 8 mmHg). There is a pleural effusion. 1/15/2016 LVE, LVH, 55%, LAE, RVE, MAYELA    Assessment:  Emmanuel Germain is a 80 y.o. patient who presented [facility] 9/11/21 with complaints of weakness and fatigue. He has PMH significant for chronic afib on coumadin, chronic diastolic CHF, DM, HLD, HTN, and BELKYS treated with BiPAP. Note Echo 1/15/2016 showed EF=55%; mild LVH; mild right chamber dilation; normal RV function. Most recent CT Chest 11/8/2018 showed main pulmonary artery dilated at 3.3 cm with cardiac chambers enlarged. Last followed by myself in 2/11/2016 and lost to f/u afterwards. He presented to ED 9/11/2021 with fatigue and weakness. He was treated for hyperglycemia, pna, and urinary retention. 9/11/2021TnT <0.01; Pro BNP 4,597. My partner Dr. Maria Victoria Villafuerte was consulted on 9/13/2021 for mgt of afib. I am reconsulted today for edema/CHF/AFIB. Venous US BLE 9/21/2021  was within normal limits; no DVT noted. CXR 9/22/2021 showed LLL pna and probable small left sided pleural effusion. Last labs today: , K+ 4.4, BUN 72/Cr 0.7, , . EKG noted afib with slow ventricular response.; RBBB; ;ow voltage QRS (no significant change from 12/9/2015). Most recent Echo 9/11/2021 showed EF=45-51%; left ventricle size decreased; mod cLVH; mild to mod AR and TR. Note he had liver biopsy 9/20/21 negative for cirrhosis; possible congestive changes. He is s/p EGD/colonoscopy showing severe esophagitis and gastric ulcer. Note he is very confused and cannot give history now.    Diagnosis of chronic afib, undefined cardiomyopathy, and acute on chronic combined systolic and diastolic CHF in older male with multiple medical problems. Recs:  1. Possible diagnosis of amyloidosis needs to be considered. I d/w Zahra Linda and he is awaiting amyloid stain on recent liver biopsy. I would consult with Dr. Mi Estrada to see his opinion and if he wants to order hematologic tests (kappa and lambda light chains). Transthyretin amyloid cardiomyopathy needs consideration also and 2 tests include cardiac MRI and technetium pyrophosphate scan. Not certain he would be able to cooperate for scans given mental status. 2. I discussed above with his wife by phone and she wants to consult with her children and Dr. Shania Piña. They are considering hospice. This diagnosis has poor long-term outcome as well. 3. Continue current treatment plan for now. 4. Note BP low and given renal issues I would avoid ACE-I or BB now. If improves would add to regimen. Patient Active Problem List   Diagnosis    [facility]rial fibrillation ([facility]    Diabetes mellitus ([facility]    Hyperlipidemia    Urinary retention    Sleep apnea    Morbid obesity with BMI of 40.0-44.9, adult ([address]    Prolonged pt (prothrombin time)    Snoring    Dyspnea    Chronic atrial fibrillation (HCC)    Essential hypertension    BELKYS treated with BiPAP    Nocturnal hypoxemia    Supratherapeutic INR    General weakness    Supratherapeutic international normalized ratio (INR)    Pneumonia due to infectious organism    Constipation    Weight loss    Elevated brain natriuretic peptide (BNP) level       Thank you for allowing to us to participate in the care or Shailesh Herman. Further evaluation will be based upon the patient's clinical course and testing results.

## 2021-09-24 NOTE — PROGRESS NOTES
Hospice aide notified RN of patient passing. RN and Charge RN verified absence of heart sounds noting 1625 as time of death. MD notified. [phone] notified.

## 2021-09-24 NOTE — FLOWSHEET NOTE
encountered family after pt's death. Spoke with son and grandson in waiting, and with pt's wife and dght in hallway. Family is grateful for the support they have received from all the care team. A strong sense of donald is bringing them hope.     09/24/21 1716   Encounter Summary   Services provided to: Family   Complexity of Encounter Moderate   Length of Encounter 30 minutes   Routine   Type Follow up   Grief and Life Adjustment   Type Death   Outcome Expressed gratitude; Hopeful;Receptive

## 2021-09-24 NOTE — PROGRESS NOTES
Assessed pt for need of PRN medication, family at bedside and hospice aide in agreement that pt appears comfortable and relaxed at this time and declined PRN medication administration for the time being. Family and hospice aide aware to notify staff for any needs. Will continue to monitor.   Patrice Avina RN

## 2021-09-24 NOTE — DISCHARGE SUMMARY
Name:  Evin Mandujano  Room:  0210/0210-01  MRN:    [id number]    Discharge Summary      This discharge summary is in conjunction with a complete physical exam done on the day of discharge. Discharging Physician: Robyn Iraheta MD        Admit: 9/11/2021  Discharge:  9/24/2021    HPI taken from admission H&P:    80year old male with a history of HTN, HLD, DM, CHF, atrial fibrillation on Coumadin, and BELKYS on BiPAP admitted with pneumonia and weight loss     81-year-old gentleman with history of atrial fibrillation, chronic congestive heart failure, diabetes type 2, history of smoking and remote history of alcohol use  Presenting with profound weakness, progressively getting worse associated with weight loss, progressive worsening LFTs over the last several of months. Now with constipation , bilateral hydronephrosis and  pneumonia. Patient was severely coagulopathic with INR over 14. Persistent recurrent episodes of hypoglycemia  Concern for possible underlying malignancy, however so far work-up has not been revealing. Patient has been seen by GI,  oncology and urology         Diagnoses this Admission and Hospital Course     Patient discharged to inpatient hospice     Multifocal pneumonia  Possible gram-negative infection  Patient has bronchiectasis  History of COPD  - on IV antibiotic -on Rocephin and doxycycline. Day 7/7 Finished antibiotics.  -Continue HHN  . Oxygen saturations are stable. Obstructive uropathy  Bilateral hydronephrosis  - Valentine catheter inserted  - Urology consulted . Likely related to severe constipation. Treat as below  -PSA normal   -Continue Flomax. Keep Valentine in. And follow-up with urology as an outpatient     Constipation  - Started on on stool regimen with good response. Patient on Colace and MiraLAX, got suppositories. + BM . Stool for occult blood was negative  -Seen by GI -> S/P  EGD and colonoscopy .      EGDs showed severe esophagitis and gastric ulcer-likely HSV or CMV  Colonoscopy had a transverse colon polyp and internal hemorrhoids  Continue PPI  Check HSV, EBV, CMV       Transaminitis  Worsening LFTs  -Ongoing for several months  Associated with significant weight loss. -Etiology unclear  -ALT AST and alk phos elevated. -Bilirubin level normal  -Ammonia level normal  -Hepatitis panel negative, LINDY negative  -AFP pending  - HSV, EBV, CMV pending  -Antimitochondrial antibody pending  - CT abdomen negative. - ferritin elevated. Hemochromatosis panel negative  -Liver biopsy results reviewed. Liver biopsy did not show any major evidence of cirrhosis of the liver. Due to changes of passive congestion a cardiology consultation was obtained. Amyloid staining of the liver is still pending. Patient's condition is much worse. Discussed with wife. Patient likely actively dying. Cardiologist discussed with spouse no plans for further investigation     Supratherapeutic INR  Coagulopathy  -INR over 14  -Likely related to poor nutritional status  -Received multiple doses of oral vitamin K. INR slowly trending down. INR improved  No active bleed   Monitor  -Liver biopsy done and reviewed. CHARAN fib   - on chronic Coumadin-hold as above. INR correcting now. -Chart reviewed , discussed with patient again . He states that he has not been on Verapamil . Heart rate stable  Checked  Echocardiogram - > normal EF  TSH normal at 1.69 . Acute metabolic encephalopathy  -With increasing confusions and evenings and nights  -Possibly sundowning  -Ammonia normal, urine analysis negative.  -Issues with hypoglycemia, blood sugars now stable but getting D5 and IV fluids. Keep rate low at 50 an hour.   Monitor sodium levels  Mental status much worse today     Hypokalemia  - repleted     Hyponatremia  - na 130 - monitor with d5 in IVF     Hypoglycemia  Recurrent issues  - has lost a lot of weight  - AM cortisol level checked and normal.  Patient with persistent recurrent episodes of hypoglycemia     Severe protein calorie malnutrition  Profound weight loss  Generalized weakness and fatigue  -Encourage nutritional supplements   -consulted PT OT   Need SNF     Chronic bilateral lower extremity wounds  -Seen by wound care     Leukopenia, thrombocytopenia  Associated with elevated LFTs, coagulopathy, weight loss  - heme-onc consulted  - likely related to pneumonia   monitored white count. Normalized now. Monitor platelet ct   Worsening LFTs     Oncology work-up so far  Hepatitis panel negative  HIV negative  Flow cytometry negative  B12 and folate normal  LINDY negative  Rheumatoid factor negative  Elevated CRP and ferritin levels.-Likely reactive. Hemochromatosis screen pending  Factor VII and lupus anticoagulant pending        COVID-19 ruled out . Patient has been vaccinated. Rapid Covid test was negative        Patient discharged to hospice care inpatient       Procedures (Please Review Full Report for Details)  Liver biopsy  EGD     Consults    Cardiology   GI      Physical Exam at Discharge:    BP (!) 112/59   Pulse 72   Temp 96.1 °F (35.6 °C) (Axillary)   Resp 20   Ht 5' 9\" (1.753 m)   Wt 152 lb 14.4 oz (69.4 kg)   SpO2 91%   BMI 22.58 kg/m²       See progress note from day of discharge     CBC: No results for input(s): WBC, HGB, HCT, MCV, PLT in the last 72 hours. BMP:   Recent Labs     09/23/21  0619   *   K 4.4   CL 95*   CO2 23   BUN 72*   CREATININE 0.7*     LIVER PROFILE:   Recent Labs     09/23/21  0619   *   *   BILITOT 0.7   ALKPHOS 433*     PT/INR:   Recent Labs     09/23/21  0618   PROTIME 28.0*   INR 2.39*     APTT: No results for input(s): APTT in the last 72 hours. UA:No results for input(s): NITRITE, COLORU, PHUR, LABCAST, WBCUA, RBCUA, MUCUS, TRICHOMONAS, YEAST, BACTERIA, CLARITYU, SPECGRAV, LEUKOCYTESUR, UROBILINOGEN, BILIRUBINUR, BLOODU, GLUCOSEU, AMORPHOUS in the last 72 hours.     Invalid input(s): KETONESU      CULTURES      RADIOLOGY  XR CHEST PORTABLE   Final Result   Left lower lobe pneumonia and probable small left-sided pleural effusion. Findings have increased compared to prior. VL Extremity Venous Bilateral   Final Result      CT NEEDLE BIOPSY LIVER PERCUTANEOUS   Final Result   Successful CT-guided random liver biopsy,         CT GUIDED NEEDLE PLACEMENT   Final Result   Successful CT-guided random liver biopsy,         IR MIDLINE CATH   Final Result   Successful placement of midline. CT CHEST ABDOMEN PELVIS W CONTRAST   Final Result   Multifocal pneumonia, greatest in the left lower lobe, new compared to   09/03/2021. There is a background emphysema and severe coronary artery   disease. Filling defects are seen in the left lower lobe, suggesting   sequelae from mucous plugging or aspiration. Bilateral hydronephrosis, likely secondary to markedly distended bladder. Left-sided inguinal hernia containing colon. No resultant bowel obstruction. CT HEAD WO CONTRAST   Final Result   No acute traumatic intracranial abnormality      Atrophy and small-vessel ischemic change      Probable calcified meningioma on the right.          XR CHEST PORTABLE   Final Result   Left lower lobe pneumonia                    Discharge Medications     Medication List      START taking these medications    pantoprazole 40 MG tablet  Commonly known as: PROTONIX  Take 1 tablet by mouth every morning (before breakfast)     polyethylene glycol 17 g packet  Commonly known as: GLYCOLAX  Take 17 g by mouth 2 times daily     sennosides-docusate sodium 8.6-50 MG tablet  Commonly known as: SENOKOT-S  Take 2 tablets by mouth daily as needed for Constipation        CHANGE how you take these medications    docusate sodium 100 MG capsule  Commonly known as: COLACE  Take 2 capsules by mouth 2 times daily  What changed:   · how much to take  · when to take this     tamsulosin 0.4 MG capsule  Commonly known as: FLOMAX  Take 2 capsules by mouth daily  What changed:   · how much to take  · when to take this        STOP taking these medications    atorvastatin 20 MG tablet  Commonly known as: LIPITOR     benazepril 40 MG tablet  Commonly known as: LOTENSIN     Coumadin 10 MG tablet  Generic drug: warfarin     furosemide 40 MG tablet  Commonly known as: LASIX     verapamil 180 MG extended release tablet  Commonly known as: CALAN SR           Where to Get Your Medications      Information about where to get these medications is not yet available    Ask your nurse or doctor about these medications  · docusate sodium 100 MG capsule  · pantoprazole 40 MG tablet  · polyethylene glycol 17 g packet  · sennosides-docusate sodium 8.6-50 MG tablet  · tamsulosin 0.4 MG capsule           Discharged in poor condition to hospice care in hospital     Follow Up:    With hospice      Jared Trevino MD 9/28/2021 3:53 PM

## 2021-09-24 NOTE — PROGRESS NOTES
Hospice aide at bedside reports patient moaning and appears in pain. Patient resting in bed with eyes closed and occasional moaning noted. PRN Morphine given per MAR.

## 2021-09-24 NOTE — FLOWSHEET NOTE
09/23/21 2204   Vital Signs   Resp 20   Pain Assessment   Pain Level 0  (comfort care; per family request)   Oxygen Therapy   SpO2 92 %   O2 Device Nasal cannula   O2 Flow Rate (L/min) 6 L/min   Full head-to-toe assessment and full set VS deferred at this time by family to help maintain pt comfort; security called to obtain coffee cart for family visiting at this time. PRN pain medication administered per family request, other scheduled medications held per family request at this time, repositioning deferred by family at this time; all efforts towards keeping pt comfort per family request so no VS or BGL to be done this shift unless family requests. Bed alarm in place, call light within reach of pt but family aware to notify staff also for any needs. Will continue to monitor.   Martin Houston RN

## 2021-09-24 NOTE — PLAN OF CARE
Restraints  Goal: Removal from restraints as soon as assessed to be safe  Outcome: Ongoing  Goal: No harm/injury to patient while restraints in use  Outcome: Ongoing  Goal: Patient's dignity will be maintained  Outcome: Ongoing

## 2021-09-24 NOTE — PROGRESS NOTES
Past Medical History


Past Medical History:  A-Fib, Anxiety, Asthma, Bronchitis, Cancer, COPD, 

Depression, Hypothyroid, Pneumonia, Other


Additional Past Medical Histor:  chronic back, lung ca; hernia, heart dis, 

emphysema, Osteopor, home o2=5L


Past Surgical History:  Appendectomy, Cholecystectomy, , Hysterectomy, 

Tonsillectomy, Other


Additional Past Surgical Histo:  back X2, hernia x 3


Alcohol Use:  None


Drug Use:  None





Adult General


Chief Complaint


Chief Complaint:  MECHANICAL FALL





HPI


HPI





Patient is a 86  year old female who presents with right hip pain after a 

mechanical fall. She was standing up to go somewhere when she fell onto her 

right side. She has skin tears of her right upper extremity and pain in her 

right hip. She does not believe she hit her head, she denies that she takes any 

blood thinners. Patient normally wears 4 L nasal cannula oxygen for chronic 

respiratory failure. She denies increased work of breathing or new cough. 

Denies any fevers. She is unsure of last tetanus immunization. she lives with 

family members.





Review of Systems


Review of Systems





Constitutional: Denies fever or chills []


Eyes: Denies change in visual acuity, redness, or eye pain []


HENT: Denies nasal congestion or sore throat []


Respiratory: Denies increased shortness of breath []


Cardiovascular: Denies chest pain


GI: Denies abdominal pain, nausea, vomiting, bloody stools or diarrhea []


: Denies dysuria or hematuria []


Musculoskeletal: Reports chronic unchanged back pain,


Skin: skin tears RUE


Neurologic: Denies headache, focal weakness or sensory changes []





Current Medications


Current Medications





Current Medications








 Medications


  (Trade)  Dose


 Ordered  Sig/Franky  Start Time


 Stop Time Status Last Admin


Dose Admin


 


 Albuterol/


 Ipratropium


  (Duoneb)  3 ml  1X  ONCE  17 12:15


 17 12:19 DC 17 12:54


3 ML


 


 Diphtheria/


 Tetanus/Acell


 Pertussis


  (Boostrix)  0.5 ml  ONCE ONCE  17 12:30


 17 12:31 DC 17 13:35


0.5 ML











Allergies


Allergies





Allergies








Coded Allergies Type Severity Reaction Last Updated Verified


 


  morphine Allergy Intermediate PER DAUGHTER, DOES NOT AGREE WITH PT 14 Yes


 


  adhesive Adverse Reaction Intermediate blisters 4/22/15 Yes


 


  aspirin Adverse Reaction Intermediate Nausea and Vomiting, "makes me sick" 4/

22/15 Yes











Physical Exam


Physical Exam





Constitutional: Well developed, well nourished, no acute distress, non-toxic 

appearance. []


HENT: Normocephalic, atraumatic, bilateral external ears normal, oropharynx 

moist, no oral exudates, nose normal. []


Eyes: PERRLA, EOMI, conjunctiva normal, no discharge. [] 


Neck: Normal range of motion, no tenderness, supple, no stridor. [] 


Cardiovascular:Heart rate regular with regular rhythm, no murmur []


Lungs & Thorax:  Bilateral breath sounds , moderate air movement, faint exp 

wheeze


Abdomen: Bowel sounds normal, soft, no tenderness, no masses, no pulsatile 

masses. [] 


Skin: Warm, dry, two quarter-moon shaped skin tears RUE lateral


Back: kyphosis present without focal stepoffs or appreciable stepoffs.  no CVA 

tenderness. [] 


Extremities: no shortening or rotation of RLE, mild ttp along lateral hip, dp 

pulses intact, LLE appears normal.


Neurologic: Alert and oriented X 3, normal motor function, normal sensory 

function, no focal deficits noted. []





Current Patient Data


Vital Signs





 Vital Signs








  Date Time  Temp Pulse Resp B/P (MAP) Pulse Ox O2 Delivery O2 Flow Rate FiO2


 


17 14:08  78 15 124/53 (76) 92 Nasal Cannula 4.0 


 


17 12:09 98.6       





 98.6       











EKG


EKG


[]





Radiology/Procedures


Radiology/Procedures


CT head:


Impression: 





    1. No acute intracranial process.     


Pelvis/left hip: 


Impression:





1. No obvious acute fracture identified. Examination limited due to osseous


demineralization.





2. Moderate degenerative changes bilateral hip joints.





Course & Med Decision Making


Course & Med Decision Making


Pertinent Labs and Imaging studies reviewed. (See chart for details)





tdap updated, wound dressed, pt declined any pain meds.





Received duoneb breathing treatment.





Pt able to walk/bear weight.  DC'd with return reprecautions, wound 

instructions.





Dragon Disclaimer


Dragon Disclaimer


This electronic medical record was generated, in whole or in part, using a 

voice recognition dictation system.





Departure


Departure


Impression:  


 Primary Impression:  


 Fall


 Additional Impression:  


 Skin tear


Disposition:   HOME, SELF-CARE


Condition:  STABLE


Referrals:  


SUMMER TABOR MD (PCP)





Problem Qualifiers











VON ARLETTE,WILIAM J MD Sep 26, 2017 12:25 Pt family called out requesting prn medication; sublingual ativan given per family request, see eMAR. Will continue to monitor.   Rachael Mcclelland RN

## 2021-09-24 NOTE — PROGRESS NOTES
Spoke with RN, Pt admitted to hospice, labs refused by family  RN will speak to MD about discontinuing warfarin consult  Cheri JOHNSON 9/24/202111:18 AM  .

## 2021-09-24 NOTE — PROGRESS NOTES
AM labs deferred by family; pt resting comfortably in bed at this time, eyes closed, no needs noted. Hospice aide remaining at bedside, aware to notify staff of any changes or needs.  notified that they do not need to do labs this AM per family request.  Will continue to monitor.   Stalin Hernández RN

## 2021-09-24 NOTE — PROGRESS NOTES
Hospitalist Progress Note      PCP: Makenna Perez MD       Date of Admission: 2021    Hospital Course:     20-year-old gentleman with history of atrial fibrillation, chronic congestive heart failure, diabetes type 2, history of smoking and remote history of alcohol use  Presenting with profound weakness, progressively getting worse associated with weight loss, progressive worsening LFTs over the last several of months. Now with constipation , bilateral hydronephrosis and  pneumonia. Patient was severely coagulopathic with INR over 14. Persistent recurrent episodes of hypoglycemia  Concern for possible underlying malignancy, however so far work-up has not been revealing. Patient has been seen by GI,  oncology and urology    S/P EGD, colonoscopy -  showed severe esophagitis and gastric ulcer-likely HSV or CMV  Colonoscopy had a transverse colon polyp and internal hemorrhoids    His LFTs are persistently elevated  . Patient is profoundly weak- seen by PT, OT . Needs SNF  Oral intake is improving now. He has been having significant episodes of confusion, and hypoglycemic episodes at night. Seems to be a little better now. On IV fluids with D5 for hypoglycemia    Subjective:      Patient is doing well today awake alert and well-oriented . Oral intake has improved  Appears to have had less confusion through the night. LFTs still significantly trending up. Plan is for liver biopsy. - patient was confused this but mentation back to normal when I saw him. Wife at bedside    -patient had liver biopsy yesterday. Awake and alert. PT and OT consult pending. Hemochromatosis mutation negative. - confused today. Had hypoglycemia again. -doing extremely poorly. Very agitated. Blood pressure is low. Oxygen saturation is poor. Patient may be actively dying. -doing poorly. And hospice. I expect the patient to  today.     Medications:  Reviewed    Infusion Medications     Scheduled Medications     PRN Meds: LORazepam, morphine 20MG/ML    No intake or output data in the 24 hours ending 09/24/21 1502    Physical Exam Performed: There were no vitals taken for this visit. Physical exam not done patient unresponsive  Labs:   No results for input(s): WBC, HGB, HCT, PLT in the last 72 hours. Recent Labs     09/23/21  0619   *   K 4.4   CL 95*   CO2 23   BUN 72*   CREATININE 0.7*   CALCIUM 8.3     Recent Labs     09/23/21  0619   *   *   BILITOT 0.7   ALKPHOS 433*     Recent Labs     09/23/21  0618   INR 2.39*     No results for input(s): Michell Comment in the last 72 hours. Urinalysis:      Lab Results   Component Value Date    NITRU Negative 09/18/2021    WBCUA 0-2 09/12/2021    RBCUA 11-20 09/12/2021    BLOODU Negative 09/18/2021    SPECGRAV 1.025 09/18/2021    GLUCOSEU Negative 09/18/2021       Radiology:  No orders to display     Component Collected Lab   H63D Hemochrom Mut 09/14/2021  6:10 AM [facility]   Negative    C282Y Hemochrom Mut 09/14/2021  6:10 AM [facility]   Negative    Hemochromatosis Gene 09/14/2021  6:10 AM [facility]   See Note      Component Value Ref Range & Units Status Collected Lab   PTT D 67High   32 - 48 sec Final 09/16/2021  5:53 AM [facility]   PT D 17. 2High   12.0 - 15.5 sec Final 09/16/2021  5:53 AM [facility]   Thrombin Time 18.3  14.7 - 19.5 sec Final 09/16/2021  5:53 AM [facility]   Reptilase Tm Not Applicable  <=87.9 sec Final 09/16/2021  5:53 AM [facility]   Ptt-D Mireille Reflex 55High   32 - 48 sec Final 09/16/2021  5:53 AM [facility]   dRVVT Screen 38  33 - 44 sec Final 09/16/2021  5:53 AM [facility]   DRVVT,DIL Not Applicable  33 - 44 sec Final 09/16/2021  5:53 AM [facility]   Lup Anticoag Interp See Note   Final 09/16/2021  5:53 AM [facility]   Lupus anticoagulant detected. Echocardiogram  Summary   Left ventricular systolic function is reduced with ejection fraction   estimated at 45-51 %. All remaining wall segments appear normal in function.    Left ventricular size is decreased. There is moderate concentric left ventricular hypertrophy. Normal left ventricular diastolic filling pressure. The right atrium is mildly dilated. The aortic root is mildly dilated. Mild mitral regurgitation. Mild-to-moderate aortic regurgitation is present. Mild to moderate tricuspid regurgitation. Normal systolic pulmonary artery pressure (SPAP) estimated at 31 mmHg (RA   pressure 8 mmHg). There is a pleural effusion. 1/15/2016 LVE, LVH, 55%, LAE, RVE, MAYELA    Liver biopsy  FINAL DIAGNOSIS:     Liver, biopsy:      - Liver with sinusoidal congestion, 2+ iron deposition, dilated and        edematous central areas and scattered lobular inflammation; negative        for steatosis or cirrhosis - see comment. COMMENT:    The clinical history of elevated liver function tests is   noted.  The submitted liver biopsy is notable for a lack of steatosis,   lack of significant portal inflammatory infiltrate and fibrosis limited   to the portal area (no bridging fibrosis or cirrhosis noted).  The biopsy   does show central areas with vascular dilation and edema, extending out   to the surrounding sinusoids with sinusoidal congestion and scattered   sinusoidal inflammation noted.  Increased iron deposition (2+) is also   noted, predominantly within sinusoidal Kupffer cells.  The described   findings are suggestive for a vascular pattern of injury, possibly   related to the known history of congestive heart failure. Immunohistochemical staining for Cytomegalovirus and Herpes simplex virus   is negative.  Please correlate with clinical findings.      ROCJO/ROCJO     Assessment/Plan:    Multifocal pneumonia  Possible gram-negative infection  Patient has bronchiectasis  History of COPD  - on IV antibiotic -on Rocephin and doxycycline. Day 7/7 Finished antibiotics.  -Continue HHN  . Oxygen saturations are stable.      Obstructive uropathy  Bilateral hydronephrosis  - Valentine catheter inserted  - Urology consulted . Likely related to severe constipation. Treat as below  -PSA normal   -Continue Flomax. Keep Valentine in. And follow-up with urology as an outpatient    Constipation  - Started on on stool regimen with good response. Patient on Colace and MiraLAX, got suppositories. + BM . Stool for occult blood was negative  -Seen by GI -> S/P  EGD and colonoscopy . EGDs showed severe esophagitis and gastric ulcer-likely HSV or CMV  Colonoscopy had a transverse colon polyp and internal hemorrhoids  Continue PPI  Check HSV, EBV, CMV       Transaminitis  Worsening LFTs  -Ongoing for several months  Associated with significant weight loss. -Etiology unclear  -ALT AST and alk phos elevated. -Bilirubin level normal  -Ammonia level normal  -Hepatitis panel negative, LINDY negative  -AFP pending  - HSV, EBV, CMV pending  -Antimitochondrial antibody pending  - CT abdomen negative. - ferritin elevated. Hemochromatosis panel negative  -Liver biopsy results reviewed. Liver biopsy did not show any major evidence of cirrhosis of the liver. Due to changes of passive congestion a cardiology consultation was obtained. Amyloid staining of the liver is still pending. Patient's condition is much worse. Discussed with wife. Patient likely actively dying. Cardiologist discussed with spouse no plans for further investigation    Supratherapeutic INR  Coagulopathy  -INR over 14  -Likely related to poor nutritional status  -Received multiple doses of oral vitamin K. INR slowly trending down. INR improved  No active bleed   Monitor  -Liver biopsy done and reviewed. A. fib   - on chronic Coumadin-hold as above. INR correcting now. -Chart reviewed , discussed with patient again . He states that he has not been on Verapamil . Heart rate stable  Checked  Echocardiogram - > normal EF  TSH normal at 1.69 .     Acute metabolic encephalopathy  -With increasing confusions and evenings and nights  -Possibly sundowning  -Ammonia normal, urine analysis negative.  -Issues with hypoglycemia, blood sugars now stable but getting D5 and IV fluids. Keep rate low at 50 an hour. Monitor sodium levels  Mental status much worse today    Hypokalemia  - repleted    Hyponatremia  - na 130 - monitor with d5 in IVF    Hypoglycemia  Recurrent issues  - has lost a lot of weight  - AM cortisol level checked and normal.  Patient with persistent recurrent episodes of hypoglycemia    Severe protein calorie malnutrition  Profound weight loss  Generalized weakness and fatigue  -Encourage nutritional supplements   -consulted PT OT   Need SNF    Chronic bilateral lower extremity wounds  -Seen by wound care    Leukopenia, thrombocytopenia  Associated with elevated LFTs, coagulopathy, weight loss  - heme-onc consulted  - likely related to pneumonia   monitored white count. Normalized now. Monitor platelet ct   Worsening LFTs    Oncology work-up so far  Hepatitis panel negative  HIV negative  Flow cytometry negative  B12 and folate normal  LINDY negative  Rheumatoid factor negative  Elevated CRP and ferritin levels.-Likely reactive. Hemochromatosis screen pending  Factor VII and lupus anticoagulant pending      COVID-19 ruled out . Patient has been vaccinated. Rapid Covid test was negative    Discussed with wife and daughter again. Diet: No diet orders on file  Code Status: DNR-CCA      Dispo - ms  Continue PT OT  He will need SNF placement. Home medications reviewed with the patient and family. patient has not been on Lipitor or verapamil for a while. his Coumadin dose was 10 mg and 5 mg alternating prior to admission. patient was otherwise taking only Lasix, Colace and Flomax. Not on any antihypertensive medications      Updated patient and his dtr at bedside with plan of care  We discussed all current findings and work-up. No clear direction about why patient has persistent LFTs and recurrent hypoglycemia. Bilirubin level and ammonia level normal  Patient also has intermittent confusion. .    Patient is terminal.  He is in hospice. I expect he will  today.         Clary Galloway MD 2021 3:02 PM

## 2021-09-24 NOTE — FLOWSHEET NOTE
responded to call from nurse to support pt and family during anticipated EOL. Pt not able to respond, and hospice aide present in room. Wife of pt was present until early morning hours and will be returning today. Decline of pt significant.  has earlier visited pt and spouse, and will continue to follow today. 09/24/21 1116   Encounter Summary   Services provided to: Patient not available   Support System Spouse; Children   Continue Visiting   (9/24 -  called to support anticipated EOL)   Complexity of Encounter Low   Length of Encounter 15 minutes   Routine   Type Follow up

## 2021-09-24 NOTE — FLOWSHEET NOTE
provided EOL support to family at bedside (pt's wife, dght, son, grandchild). Hospice care also present. Sharing of family life story. Family of donald. Welcomed prayer. Vasyl Minium will continue to follow. 09/24/21 1441   Encounter Summary   Services provided to: Patient and family together   Referral/Consult From: [employer] [facility]; Children   Continue Visiting   (9/24 - Continuing EOL support, prayer)   Complexity of Encounter Moderate   Length of Encounter 30 minutes   Spiritual Assessment Completed Yes   Grief and Life Adjustment   Type End of life   Assessment Calm; Approachable  (pt unable to respond)   Intervention Active listening;Explored feelings, thoughts, concerns;Prayer;Sustaining presence/ Ministry of presence; End of life care   Outcome Expressed gratitude;Engaged in conversation; Shared life review;Receptive

## 2021-09-24 NOTE — CARE COORDINATION
Pt is partnered with [facility] at the hospital.    CM will follow at a distance. Please notify CM if needs or concerns arise.

## 2021-09-27 NOTE — PROGRESS NOTES
Death within 24hrs/7days of removal of 2 point soft wrist restraints only. No reporting required to CMS.  May Bollard

## 2021-09-28 NOTE — DISCHARGE SUMMARY
Death summary          Cause of death: cardio pulmonary arrest from underlying pneumonia and liver disease.       Admitted to hospice and passed away peacefully      Marc Gibbons MD 9/28/2021 2:13 PM

## 2022-11-08 NOTE — PROGRESS NOTES
Component      Latest Ref Rng & Units 11/7/2022   LEAD, BLOOD/CAP      <=3.4 mcg/dL 5.0 (H)       No result note available at this time  Please advise   Occupational Therapy Daily Treatment Note    Unit: 2 [place]  Date:  9/14/2021  Patient Name:    Dao Durham  Admitting diagnosis:  General weakness [R53.1]  Elevated brain natriuretic peptide (BNP) level [R79.89]  Supratherapeutic INR [R79.1]  Supratherapeutic international normalized ratio (INR) [R79.1]  Pneumonia due to infectious organism, unspecified laterality, unspecified part of lung [J18.9]  Admit Date:  9/11/2021  Precautions/Restrictions:    Fall risk, Bed/chair alarm, Lines -Valentine catheter, Telemetry and Continuous pulse oximetry      Discharge Recommendations: SNF  DME needs for discharge: defer to facility       Therapy recommendations for staff:   Assist of 2 with use of ANTONIA STESUBHASH for all transfers to/from [facility]  to/from chair    AM-PAC Score: AM-PAC Inpatient Daily Activity Raw Score: 16  Home Health S4 Level: NA       Treatment Time:  2042-3679  Treatment number:  2   Timed code treatment minutes: 50 minutes  Total treatment minutes:   50  minutes    History of Present Illness:   per H&P on 9-11-21 Moses Vaca MD   80 y. o. male history of hypertension hyperlipidemia A. fib, CHF who presents to the emergency department complaining of generalized weakness fatigue, falls. Maryana Loots found to have hypoglycemia earlier today with a blood glucose into the 40s.  Former diabetic form of Metformin no longer on medications for this with improved A1c.  Patient has had weight loss in the last month over 15 pounds unintentionally.  Family was concerned over the last 1 month increasing fatigue and difficulty with completing ADLs.  Has had difficulty now getting out of bed in the last 24 hours requiring assistance.  Had a fall down on on the ground but did not hit head.  Is on Coumadin for history of A. fib.  No chest pain no shortness of breath no abdominal pain.   Subjective:  Pt in the bed and needing encouragement to participate    Pain   Yes  Rating: mild  Location:LE  Pain Medicine Status: No request made      Bed Mobility:   Supine to Sit:  Mod A   Sit to Supine:  Not Tested  Rolling:           Not Tested  Scooting:        Min A     Transfer Training:   Sit to stand: Mod A  and 2 persons  Stand to sit:  Mod A  and 2 persons  Bed to Chair:  mod assist with SW  Bed to [facility]:   Not Tested  Standard toilet:   Not Tested    Activity Tolerance   Pt completed therapy session with pain , fatigue , decreased balance     ADL Training:   Upper body dressing: Not Tested  Upper body bathing:  Not Tested  Lower body dressing:  Not Tested  Lower body bathing:  Not Tested  Toileting:   Not Tested  Grooming/Hygiene:  Not Tested    Therapeutic Exercise:   N/A    Patient Education:   Role of OT  Safe RW use/hand placement    Positioning Needs:   Pt up in chair, no alarm needed, positioned in proper neutral alignment and pressure relief provided. Family Present:  Yes    Assessment:   Pt was mod assist for supine to sit. The pt was mod assist of two with bed elevated for pt to stand to the stedy. The pt instructed in use of walker and was able with encouragement to transfer to the chair with mod assist. The pts sitting balance was good sitting on the edge of the bed. Recommended to the staff to use the stedy for transfer back to the bed. Pt continues to need OT to increase functional IND. GOALS  To be met in 3 Visits:  1). Bed to toilet/BSC: Mod A  and 2 persons with RW     To be met in 5 Visits:  1). Supine to/from Sit:             Min A   2). Upper Body Bathing:         CGA  3). Lower Body Bathing: Mod A   4). Upper Body Dressing:       Min A   5). Lower Body Dressing: Mod A  and Max A   6).  Pt to demonstrate UE exs x 15 reps with minimal cues      Plan: cont with HALEY Stevens OTR/L [id number]      If patient discharges from this facility prior to next visit, this note will serve as the Discharge Summary

## 2024-09-27 NOTE — PROGRESS NOTES
Consult has been called to Dr. Eri Bautista on 9/212/2021. Spoke with Shira Martínez.  4:50 PM    Chacorta De Leon  9/12/2021 Name and  verified by Danae Mann RN

## (undated) DEVICE — SNARE ENDOSCP L240CM SHTH DIA24MM LOOP W10MM POLYP RND REINF

## (undated) DEVICE — ELECTRODE,RADIOTRANSLUCENT,FOAM,3PK: Brand: MEDLINE

## (undated) DEVICE — ENDOSCOPIC KIT 2 12 FT OP4 DE2 GWN SYR

## (undated) DEVICE — ENDO CARRY-ON PROCEDURE KIT INCLUDES SUCTION TUBING, LUBRICANT, GAUZE, BIOHAZARD STICKER, TRANSPORT PAD AND INTERCEPT BEDSIDE KIT.: Brand: ENDO CARRY-ON PROCEDURE KIT

## (undated) DEVICE — FORCEP BX STD CAP 240CM RAD JAW 4

## (undated) DEVICE — CONMED SCOPE SAVER BITE BLOCK, 20X27 MM: Brand: SCOPE SAVER

## (undated) DEVICE — YANKAUER,BULB TIP,W/O VENT,RIGID,STERILE: Brand: MEDLINE

## (undated) DEVICE — CANNULA,OXY,ADULT,SUPERSOFT,W/7'TUB,SC: Brand: MEDLINE INDUSTRIES, INC.

## (undated) DEVICE — TRAP POLYP ETRAP